# Patient Record
Sex: FEMALE | Race: WHITE | Employment: UNEMPLOYED | URBAN - METROPOLITAN AREA
[De-identification: names, ages, dates, MRNs, and addresses within clinical notes are randomized per-mention and may not be internally consistent; named-entity substitution may affect disease eponyms.]

---

## 2017-01-06 PROBLEM — M25.551 RIGHT HIP PAIN: Status: ACTIVE | Noted: 2017-01-06

## 2017-02-27 PROBLEM — S86.812A PATELLAR TENDON STRAIN, LEFT, INITIAL ENCOUNTER: Status: ACTIVE | Noted: 2017-02-27

## 2017-04-12 PROBLEM — E27.9 ADRENAL NODULE (HCC): Status: ACTIVE | Noted: 2017-04-12

## 2017-04-12 PROBLEM — I70.1 RENAL ARTERY ATHEROSCLEROSIS (HCC): Status: ACTIVE | Noted: 2017-04-12

## 2017-04-12 PROBLEM — E27.8 ADRENAL NODULE (HCC): Status: ACTIVE | Noted: 2017-04-12

## 2017-04-12 PROBLEM — I70.1 RENAL ARTERY ATHEROSCLEROSIS: Status: ACTIVE | Noted: 2017-04-12

## 2017-07-19 PROBLEM — E11.65 UNCONTROLLED TYPE 2 DIABETES MELLITUS WITH HYPERGLYCEMIA (HCC): Status: ACTIVE | Noted: 2017-07-19

## 2017-08-14 PROBLEM — S69.91XD RIGHT WRIST INJURY, SUBSEQUENT ENCOUNTER: Status: ACTIVE | Noted: 2017-08-14

## 2017-08-14 PROBLEM — M18.11 PRIMARY OSTEOARTHRITIS OF FIRST CARPOMETACARPAL JOINT OF RIGHT HAND: Status: ACTIVE | Noted: 2017-08-14

## 2017-10-26 PROBLEM — M25.551 RIGHT HIP PAIN: Status: RESOLVED | Noted: 2017-01-06 | Resolved: 2017-10-26

## 2017-10-26 PROBLEM — S69.91XD RIGHT WRIST INJURY, SUBSEQUENT ENCOUNTER: Status: RESOLVED | Noted: 2017-08-14 | Resolved: 2017-10-26

## 2017-10-26 PROBLEM — E11.65 UNCONTROLLED TYPE 2 DIABETES MELLITUS WITH HYPERGLYCEMIA (HCC): Status: RESOLVED | Noted: 2017-07-19 | Resolved: 2017-10-26

## 2017-10-26 PROBLEM — S86.812A PATELLAR TENDON STRAIN, LEFT, INITIAL ENCOUNTER: Status: RESOLVED | Noted: 2017-02-27 | Resolved: 2017-10-26

## 2017-11-10 PROBLEM — M53.3 CHRONIC RIGHT SACROILIAC JOINT PAIN: Status: ACTIVE | Noted: 2017-11-10

## 2017-11-10 PROBLEM — G89.29 CHRONIC RIGHT SACROILIAC JOINT PAIN: Status: ACTIVE | Noted: 2017-11-10

## 2018-01-03 PROBLEM — L03.113 CELLULITIS OF RIGHT ELBOW: Status: ACTIVE | Noted: 2018-01-03

## 2018-01-05 PROBLEM — Z45.2 PICC (PERIPHERALLY INSERTED CENTRAL CATHETER) FLUSH: Status: ACTIVE | Noted: 2018-01-05

## 2018-01-19 PROBLEM — R26.9 GAIT ABNORMALITY: Status: ACTIVE | Noted: 2018-01-19

## 2018-01-19 PROBLEM — R42 DIZZINESS: Status: ACTIVE | Noted: 2018-01-19

## 2018-01-22 PROCEDURE — 80061 LIPID PANEL: CPT | Performed by: INTERNAL MEDICINE

## 2018-01-24 PROBLEM — I50.32 CHRONIC DIASTOLIC CHF (CONGESTIVE HEART FAILURE) (HCC): Status: ACTIVE | Noted: 2018-01-24

## 2018-01-25 PROBLEM — R42 DIZZINESS: Status: RESOLVED | Noted: 2018-01-19 | Resolved: 2018-01-25

## 2018-01-25 PROBLEM — M18.11 PRIMARY OSTEOARTHRITIS OF FIRST CARPOMETACARPAL JOINT OF RIGHT HAND: Status: RESOLVED | Noted: 2017-08-14 | Resolved: 2018-01-25

## 2018-01-25 PROBLEM — M53.3 CHRONIC RIGHT SACROILIAC JOINT PAIN: Status: RESOLVED | Noted: 2017-11-10 | Resolved: 2018-01-25

## 2018-01-25 PROBLEM — G89.29 CHRONIC RIGHT SACROILIAC JOINT PAIN: Status: RESOLVED | Noted: 2017-11-10 | Resolved: 2018-01-25

## 2018-07-18 PROBLEM — Z79.4 TYPE 2 DIABETES MELLITUS WITH STAGE 3 CHRONIC KIDNEY DISEASE, WITH LONG-TERM CURRENT USE OF INSULIN (HCC): Status: ACTIVE | Noted: 2018-07-18

## 2018-07-18 PROBLEM — M25.562 ACUTE PAIN OF BOTH KNEES: Status: ACTIVE | Noted: 2018-07-18

## 2018-07-18 PROBLEM — E11.22 TYPE 2 DIABETES MELLITUS WITH STAGE 3 CHRONIC KIDNEY DISEASE, WITH LONG-TERM CURRENT USE OF INSULIN (HCC): Status: ACTIVE | Noted: 2018-07-18

## 2018-07-18 PROBLEM — N18.30 TYPE 2 DIABETES MELLITUS WITH STAGE 3 CHRONIC KIDNEY DISEASE, WITH LONG-TERM CURRENT USE OF INSULIN (HCC): Status: ACTIVE | Noted: 2018-07-18

## 2018-07-18 PROBLEM — M25.561 ACUTE PAIN OF BOTH KNEES: Status: ACTIVE | Noted: 2018-07-18

## 2018-08-21 PROBLEM — M25.562 CHRONIC PAIN OF LEFT KNEE: Status: ACTIVE | Noted: 2018-08-21

## 2018-08-21 PROBLEM — G89.29 CHRONIC PAIN OF LEFT KNEE: Status: ACTIVE | Noted: 2018-08-21

## 2018-08-21 PROCEDURE — 86141 C-REACTIVE PROTEIN HS: CPT | Performed by: ORTHOPAEDIC SURGERY

## 2018-09-14 PROBLEM — M47.817 LUMBOSACRAL SPONDYLOSIS WITHOUT MYELOPATHY: Status: ACTIVE | Noted: 2018-09-14

## 2018-10-15 PROBLEM — N17.9 ACUTE RENAL FAILURE (HCC): Status: ACTIVE | Noted: 2018-10-15

## 2018-10-15 PROBLEM — R55 SYNCOPE: Status: ACTIVE | Noted: 2018-06-18
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
## 2018-10-15 PROBLEM — M70.21 OLECRANON BURSITIS, RIGHT ELBOW: Status: ACTIVE | Noted: 2017-12-20

## 2018-10-15 PROBLEM — N17.9 ACUTE RENAL FAILURE: Status: ACTIVE | Noted: 2018-10-15

## 2019-01-07 PROBLEM — M46.96 INFLAMMATORY SPONDYLOPATHY OF LUMBAR REGION (HCC): Status: ACTIVE | Noted: 2019-01-07

## 2019-01-07 PROBLEM — I48.92 ATRIAL FLUTTER, UNSPECIFIED TYPE (HCC): Status: ACTIVE | Noted: 2019-01-07

## 2019-01-07 PROBLEM — M46.96 INFLAMMATORY SPONDYLOPATHY OF LUMBAR REGION: Status: ACTIVE | Noted: 2019-01-07
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
## 2019-01-21 PROBLEM — Z79.899 POLYPHARMACY: Status: ACTIVE | Noted: 2018-12-21

## 2019-01-21 PROBLEM — R00.0 SINUS TACHYCARDIA: Status: ACTIVE | Noted: 2018-12-20

## 2019-01-21 PROBLEM — R41.0 EPISODIC CONFUSION: Status: ACTIVE | Noted: 2018-12-20

## 2019-01-21 PROBLEM — E86.0 DEHYDRATION: Status: ACTIVE | Noted: 2018-12-20

## 2019-01-21 PROBLEM — R41.0 DELIRIUM: Status: ACTIVE | Noted: 2018-12-21

## 2019-03-19 PROCEDURE — 88175 CYTOPATH C/V AUTO FLUID REDO: CPT | Performed by: FAMILY MEDICINE

## 2019-03-19 PROCEDURE — 87086 URINE CULTURE/COLONY COUNT: CPT | Performed by: FAMILY MEDICINE

## 2019-03-19 PROCEDURE — 87624 HPV HI-RISK TYP POOLED RSLT: CPT | Performed by: FAMILY MEDICINE

## 2019-06-07 PROBLEM — E66.01 CLASS 2 SEVERE OBESITY WITH SERIOUS COMORBIDITY AND BODY MASS INDEX (BMI) OF 37.0 TO 37.9 IN ADULT (HCC): Status: ACTIVE | Noted: 2019-06-07

## 2019-06-07 PROBLEM — E66.812 CLASS 2 SEVERE OBESITY WITH SERIOUS COMORBIDITY AND BODY MASS INDEX (BMI) OF 37.0 TO 37.9 IN ADULT (HCC): Status: ACTIVE | Noted: 2019-06-07

## 2019-06-07 PROBLEM — J43.9 EMPHYSEMA OF LUNG (HCC): Status: ACTIVE | Noted: 2019-06-07

## 2019-06-10 PROBLEM — Z98.890 S/P ABLATION OF VENTRICULAR ARRHYTHMIA: Status: ACTIVE | Noted: 2019-06-10

## 2019-06-10 PROBLEM — Z86.79 S/P ABLATION OF VENTRICULAR ARRHYTHMIA: Status: ACTIVE | Noted: 2019-06-10

## 2019-06-12 PROBLEM — I47.19 ATRIAL TACHYCARDIA (HCC): Status: ACTIVE | Noted: 2019-06-12

## 2019-06-12 PROBLEM — I47.1 ATRIAL TACHYCARDIA (HCC): Status: ACTIVE | Noted: 2019-06-12

## 2019-08-26 PROBLEM — E11.22 TYPE 2 DIABETES MELLITUS WITH STAGE 3 CHRONIC KIDNEY DISEASE, WITHOUT LONG-TERM CURRENT USE OF INSULIN (HCC): Status: ACTIVE | Noted: 2018-07-18

## 2019-08-26 PROBLEM — N18.30 TYPE 2 DIABETES MELLITUS WITH STAGE 3 CHRONIC KIDNEY DISEASE, WITHOUT LONG-TERM CURRENT USE OF INSULIN (HCC): Status: ACTIVE | Noted: 2018-07-18

## 2019-11-25 PROBLEM — I47.10 SVT (SUPRAVENTRICULAR TACHYCARDIA) (HCC): Status: ACTIVE | Noted: 2019-11-25

## 2019-11-25 PROBLEM — I47.1 SVT (SUPRAVENTRICULAR TACHYCARDIA) (HCC): Status: ACTIVE | Noted: 2019-11-25
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
## 2019-12-26 PROBLEM — E86.0 DEHYDRATION: Status: RESOLVED | Noted: 2018-12-20 | Resolved: 2019-12-26

## 2019-12-26 PROBLEM — M25.561 ACUTE PAIN OF BOTH KNEES: Status: RESOLVED | Noted: 2018-07-18 | Resolved: 2019-12-26

## 2019-12-26 PROBLEM — M25.562 ACUTE PAIN OF BOTH KNEES: Status: RESOLVED | Noted: 2018-07-18 | Resolved: 2019-12-26

## 2019-12-26 PROBLEM — L03.113 CELLULITIS OF RIGHT ELBOW: Status: RESOLVED | Noted: 2018-01-03 | Resolved: 2019-12-26

## 2020-01-07 PROBLEM — Z79.4 TYPE 2 DIABETES MELLITUS WITH STAGE 3 CHRONIC KIDNEY DISEASE, WITH LONG-TERM CURRENT USE OF INSULIN (HCC): Status: ACTIVE | Noted: 2020-01-07

## 2020-01-07 PROBLEM — E11.22 TYPE 2 DIABETES MELLITUS WITH STAGE 3 CHRONIC KIDNEY DISEASE, WITH LONG-TERM CURRENT USE OF INSULIN (HCC): Status: ACTIVE | Noted: 2020-01-07

## 2020-01-07 PROBLEM — N18.30 TYPE 2 DIABETES MELLITUS WITH STAGE 3 CHRONIC KIDNEY DISEASE, WITH LONG-TERM CURRENT USE OF INSULIN (HCC): Status: ACTIVE | Noted: 2020-01-07

## 2020-02-28 PROBLEM — N18.30 TYPE 2 DIABETES MELLITUS WITH STAGE 3 CHRONIC KIDNEY DISEASE, WITHOUT LONG-TERM CURRENT USE OF INSULIN (HCC): Status: RESOLVED | Noted: 2018-07-18 | Resolved: 2020-02-28

## 2020-02-28 PROBLEM — E11.22 TYPE 2 DIABETES MELLITUS WITH STAGE 3 CHRONIC KIDNEY DISEASE, WITHOUT LONG-TERM CURRENT USE OF INSULIN (HCC): Status: RESOLVED | Noted: 2018-07-18 | Resolved: 2020-02-28

## 2020-06-11 PROBLEM — J44.9 COPD (CHRONIC OBSTRUCTIVE PULMONARY DISEASE) (HCC): Status: ACTIVE | Noted: 2020-06-11

## 2020-12-03 PROBLEM — I77.819 ECTATIC AORTA: Status: ACTIVE | Noted: 2020-12-03

## 2020-12-03 PROBLEM — I77.819 ECTATIC AORTA (HCC): Status: ACTIVE | Noted: 2020-12-03

## 2021-02-12 PROBLEM — I48.92 ATRIAL FLUTTER (HCC): Status: ACTIVE | Noted: 2021-02-12

## 2021-02-15 PROCEDURE — 88305 TISSUE EXAM BY PATHOLOGIST: CPT | Performed by: STUDENT IN AN ORGANIZED HEALTH CARE EDUCATION/TRAINING PROGRAM

## 2021-04-09 DIAGNOSIS — Z23 NEED FOR VACCINATION: ICD-10-CM

## 2021-07-12 PROBLEM — I47.19 ATRIAL TACHYCARDIA (HCC): Status: RESOLVED | Noted: 2019-06-12 | Resolved: 2021-07-12

## 2021-07-12 PROBLEM — N17.9 ACUTE RENAL FAILURE (HCC): Status: RESOLVED | Noted: 2018-10-15 | Resolved: 2021-07-12

## 2021-07-12 PROBLEM — I47.1 ATRIAL TACHYCARDIA (HCC): Status: RESOLVED | Noted: 2019-06-12 | Resolved: 2021-07-12

## 2021-07-12 PROBLEM — N17.9 ACUTE RENAL FAILURE: Status: RESOLVED | Noted: 2018-10-15 | Resolved: 2021-07-12

## 2021-08-20 PROBLEM — E66.09 CLASS 2 OBESITY DUE TO EXCESS CALORIES WITH BODY MASS INDEX (BMI) OF 39.0 TO 39.9 IN ADULT: Status: ACTIVE | Noted: 2019-06-07

## 2021-08-20 PROBLEM — E66.812 CLASS 2 OBESITY DUE TO EXCESS CALORIES WITH BODY MASS INDEX (BMI) OF 39.0 TO 39.9 IN ADULT: Status: ACTIVE | Noted: 2019-06-07

## 2021-10-25 PROBLEM — I65.21 STENOSIS OF RIGHT CAROTID ARTERY: Status: ACTIVE | Noted: 2021-10-25

## 2021-12-20 PROBLEM — D68.69 SECONDARY HYPERCOAGULABLE STATE (HCC): Status: ACTIVE | Noted: 2021-12-20

## 2021-12-20 PROBLEM — G31.9 BRAIN ATROPHY: Status: ACTIVE | Noted: 2021-12-20

## 2021-12-20 PROBLEM — G31.9 BRAIN ATROPHY (HCC): Status: ACTIVE | Noted: 2021-12-20

## 2021-12-20 PROBLEM — I13.0 HYPERTENSIVE HEART AND RENAL DISEASE WITH HEART FAILURE (HCC): Status: ACTIVE | Noted: 2021-12-20

## 2022-01-20 PROBLEM — N18.31 STAGE 3A CHRONIC KIDNEY DISEASE (HCC): Status: ACTIVE | Noted: 2022-01-20

## 2022-01-20 PROBLEM — G40.909 NONINTRACTABLE EPILEPSY WITHOUT STATUS EPILEPTICUS, UNSPECIFIED EPILEPSY TYPE (HCC): Status: ACTIVE | Noted: 2022-01-20

## 2022-02-04 PROBLEM — G40.909 NONINTRACTABLE EPILEPSY WITHOUT STATUS EPILEPTICUS, UNSPECIFIED EPILEPSY TYPE (HCC): Status: RESOLVED | Noted: 2022-01-20 | Resolved: 2022-02-04

## 2022-03-30 PROBLEM — H81.12 BENIGN PAROXYSMAL VERTIGO OF LEFT EAR: Status: ACTIVE | Noted: 2022-03-30

## 2022-04-08 PROBLEM — H81.13 BENIGN PAROXYSMAL VERTIGO OF BOTH EARS: Status: ACTIVE | Noted: 2022-04-08

## 2022-06-01 PROBLEM — S63.636A SPRAIN OF INTERPHALANGEAL JOINT OF RIGHT LITTLE FINGER: Status: ACTIVE | Noted: 2022-04-15

## 2022-06-01 PROBLEM — M79.644 THUMB PAIN, RIGHT: Status: ACTIVE | Noted: 2022-04-15

## 2022-10-18 PROBLEM — M17.11 PRIMARY OSTEOARTHRITIS OF RIGHT KNEE: Status: ACTIVE | Noted: 2022-09-15

## 2022-11-18 RX ORDER — ACETAMINOPHEN AND CODEINE PHOSPHATE 300; 30 MG/1; MG/1
1 TABLET ORAL EVERY 8 HOURS PRN
Status: ON HOLD | COMMUNITY
Start: 2022-10-29 | End: 2022-12-08 | Stop reason: CLARIF

## 2022-12-05 ENCOUNTER — LABORATORY ENCOUNTER (OUTPATIENT)
Dept: LAB | Age: 62
End: 2022-12-05
Payer: MEDICARE

## 2022-12-05 ENCOUNTER — LAB ENCOUNTER (OUTPATIENT)
Dept: LAB | Age: 62
End: 2022-12-05
Payer: MEDICARE

## 2022-12-05 DIAGNOSIS — Z01.812 ENCOUNTER FOR PREPROCEDURE SCREENING LABORATORY TESTING FOR COVID-19: ICD-10-CM

## 2022-12-05 DIAGNOSIS — M17.11 PRIMARY OSTEOARTHRITIS OF RIGHT KNEE: ICD-10-CM

## 2022-12-05 DIAGNOSIS — Z20.822 ENCOUNTER FOR PREPROCEDURE SCREENING LABORATORY TESTING FOR COVID-19: ICD-10-CM

## 2022-12-05 LAB
ANTIBODY SCREEN: NEGATIVE
RH BLOOD TYPE: POSITIVE

## 2022-12-05 PROCEDURE — 86850 RBC ANTIBODY SCREEN: CPT

## 2022-12-05 PROCEDURE — 86901 BLOOD TYPING SEROLOGIC RH(D): CPT

## 2022-12-05 PROCEDURE — 86900 BLOOD TYPING SEROLOGIC ABO: CPT

## 2022-12-05 PROCEDURE — 36415 COLL VENOUS BLD VENIPUNCTURE: CPT

## 2022-12-06 LAB — SARS-COV-2 RNA RESP QL NAA+PROBE: NOT DETECTED

## 2022-12-07 ENCOUNTER — ANESTHESIA EVENT (OUTPATIENT)
Dept: SURGERY | Facility: HOSPITAL | Age: 62
End: 2022-12-07
Payer: MEDICARE

## 2022-12-08 ENCOUNTER — ANESTHESIA (OUTPATIENT)
Dept: SURGERY | Facility: HOSPITAL | Age: 62
End: 2022-12-08
Payer: MEDICARE

## 2022-12-08 ENCOUNTER — APPOINTMENT (OUTPATIENT)
Dept: GENERAL RADIOLOGY | Facility: HOSPITAL | Age: 62
End: 2022-12-08
Attending: PHYSICIAN ASSISTANT
Payer: MEDICARE

## 2022-12-08 ENCOUNTER — HOSPITAL ENCOUNTER (OUTPATIENT)
Facility: HOSPITAL | Age: 62
Discharge: HOME HEALTH | End: 2022-12-09
Attending: ORTHOPAEDIC SURGERY | Admitting: ORTHOPAEDIC SURGERY
Payer: MEDICARE

## 2022-12-08 DIAGNOSIS — M17.11 PRIMARY OSTEOARTHRITIS OF RIGHT KNEE: Primary | ICD-10-CM

## 2022-12-08 DIAGNOSIS — Z20.822 ENCOUNTER FOR PREPROCEDURE SCREENING LABORATORY TESTING FOR COVID-19: ICD-10-CM

## 2022-12-08 DIAGNOSIS — Z01.812 ENCOUNTER FOR PREPROCEDURE SCREENING LABORATORY TESTING FOR COVID-19: ICD-10-CM

## 2022-12-08 LAB
GLUCOSE BLD-MCNC: 150 MG/DL (ref 70–99)
GLUCOSE BLD-MCNC: 182 MG/DL (ref 70–99)
GLUCOSE BLD-MCNC: 73 MG/DL (ref 70–99)
GLUCOSE BLD-MCNC: 79 MG/DL (ref 70–99)
GLUCOSE BLD-MCNC: 85 MG/DL (ref 70–99)

## 2022-12-08 PROCEDURE — 76942 ECHO GUIDE FOR BIOPSY: CPT | Performed by: ANESTHESIOLOGY

## 2022-12-08 PROCEDURE — 0SRC0J9 REPLACEMENT OF RIGHT KNEE JOINT WITH SYNTHETIC SUBSTITUTE, CEMENTED, OPEN APPROACH: ICD-10-PCS | Performed by: ORTHOPAEDIC SURGERY

## 2022-12-08 PROCEDURE — 88311 DECALCIFY TISSUE: CPT | Performed by: ORTHOPAEDIC SURGERY

## 2022-12-08 PROCEDURE — 82962 GLUCOSE BLOOD TEST: CPT

## 2022-12-08 PROCEDURE — 97530 THERAPEUTIC ACTIVITIES: CPT

## 2022-12-08 PROCEDURE — 88305 TISSUE EXAM BY PATHOLOGIST: CPT | Performed by: ORTHOPAEDIC SURGERY

## 2022-12-08 PROCEDURE — 97162 PT EVAL MOD COMPLEX 30 MIN: CPT

## 2022-12-08 PROCEDURE — 73560 X-RAY EXAM OF KNEE 1 OR 2: CPT | Performed by: PHYSICIAN ASSISTANT

## 2022-12-08 DEVICE — ATTUNE KNEE SYSTEM TIBIAL INSERT ROTATING PLATFORM POSTERIOR STABILIZED 4 5MM AOX
Type: IMPLANTABLE DEVICE | Site: KNEE | Status: FUNCTIONAL
Brand: ATTUNE

## 2022-12-08 DEVICE — ATTUNE PATELLA MEDIALIZED ANATOMIC 32MM CEMENTED AOX
Type: IMPLANTABLE DEVICE | Site: KNEE | Status: FUNCTIONAL
Brand: ATTUNE

## 2022-12-08 DEVICE — ATTUNE KNEE SYSTEM TIBIAL BASE ROTATING PLATFORM SIZE 4 CEMENTED
Type: IMPLANTABLE DEVICE | Site: KNEE | Status: FUNCTIONAL
Brand: ATTUNE

## 2022-12-08 DEVICE — SMARTSET GHV GENTAMICIN HIGH VISCOSITY BONE CEMENT 40G
Type: IMPLANTABLE DEVICE | Site: KNEE | Status: FUNCTIONAL
Brand: SMARTSET

## 2022-12-08 DEVICE — ATTUNE KNEE SYSTEM FEMORAL POSTERIOR STABILIZED NARROW SIZE 4N RIGHT CEMENTED
Type: IMPLANTABLE DEVICE | Site: KNEE | Status: FUNCTIONAL
Brand: ATTUNE

## 2022-12-08 RX ORDER — DIAZEPAM 5 MG/1
5 TABLET ORAL DAILY PRN
Status: DISCONTINUED | OUTPATIENT
Start: 2022-12-08 | End: 2022-12-09

## 2022-12-08 RX ORDER — MONTELUKAST SODIUM 10 MG/1
10 TABLET ORAL NIGHTLY
COMMUNITY

## 2022-12-08 RX ORDER — NICOTINE POLACRILEX 4 MG
15 LOZENGE BUCCAL
Status: DISCONTINUED | OUTPATIENT
Start: 2022-12-08 | End: 2022-12-08 | Stop reason: HOSPADM

## 2022-12-08 RX ORDER — MONTELUKAST SODIUM 10 MG/1
10 TABLET ORAL NIGHTLY
Status: DISCONTINUED | OUTPATIENT
Start: 2022-12-08 | End: 2022-12-09

## 2022-12-08 RX ORDER — BISACODYL 10 MG
10 SUPPOSITORY, RECTAL RECTAL
Status: DISCONTINUED | OUTPATIENT
Start: 2022-12-08 | End: 2022-12-09

## 2022-12-08 RX ORDER — ACETAMINOPHEN 325 MG/1
650 TABLET ORAL ONCE
Status: COMPLETED | OUTPATIENT
Start: 2022-12-08 | End: 2022-12-08

## 2022-12-08 RX ORDER — OXYCODONE HYDROCHLORIDE 10 MG/1
10 TABLET ORAL EVERY 4 HOURS PRN
Status: DISCONTINUED | OUTPATIENT
Start: 2022-12-08 | End: 2022-12-09

## 2022-12-08 RX ORDER — TIZANIDINE 2 MG/1
2 TABLET ORAL EVERY 6 HOURS PRN
Status: DISCONTINUED | OUTPATIENT
Start: 2022-12-08 | End: 2022-12-09

## 2022-12-08 RX ORDER — OXYCODONE HYDROCHLORIDE 5 MG/1
1 TABLET ORAL EVERY 4 HOURS PRN
COMMUNITY
Start: 2022-11-30

## 2022-12-08 RX ORDER — DEXTROSE MONOHYDRATE 50 MG/ML
INJECTION, SOLUTION INTRAVENOUS CONTINUOUS PRN
Status: DISCONTINUED | OUTPATIENT
Start: 2022-12-08 | End: 2022-12-08 | Stop reason: SURG

## 2022-12-08 RX ORDER — CYCLOBENZAPRINE HCL 5 MG
5 TABLET ORAL EVERY 8 HOURS PRN
Status: DISCONTINUED | OUTPATIENT
Start: 2022-12-08 | End: 2022-12-09

## 2022-12-08 RX ORDER — NICOTINE POLACRILEX 4 MG
30 LOZENGE BUCCAL
Status: DISCONTINUED | OUTPATIENT
Start: 2022-12-08 | End: 2022-12-08 | Stop reason: HOSPADM

## 2022-12-08 RX ORDER — PANTOPRAZOLE SODIUM 20 MG/1
20 TABLET, DELAYED RELEASE ORAL
Status: DISCONTINUED | OUTPATIENT
Start: 2022-12-09 | End: 2022-12-09

## 2022-12-08 RX ORDER — CELECOXIB 200 MG/1
200 CAPSULE ORAL DAILY
COMMUNITY
Start: 2022-11-30 | End: 2022-12-30

## 2022-12-08 RX ORDER — VANCOMYCIN HYDROCHLORIDE
15 ONCE
Status: COMPLETED | OUTPATIENT
Start: 2022-12-08 | End: 2022-12-09

## 2022-12-08 RX ORDER — QUETIAPINE FUMARATE 100 MG/1
100 TABLET, FILM COATED ORAL NIGHTLY PRN
Status: DISCONTINUED | OUTPATIENT
Start: 2022-12-08 | End: 2022-12-09

## 2022-12-08 RX ORDER — SENNOSIDES 8.6 MG
17.2 TABLET ORAL NIGHTLY
Status: DISCONTINUED | OUTPATIENT
Start: 2022-12-08 | End: 2022-12-09

## 2022-12-08 RX ORDER — LOSARTAN POTASSIUM 25 MG/1
25 TABLET ORAL DAILY
Status: DISCONTINUED | OUTPATIENT
Start: 2022-12-09 | End: 2022-12-09

## 2022-12-08 RX ORDER — SODIUM CHLORIDE, SODIUM LACTATE, POTASSIUM CHLORIDE, CALCIUM CHLORIDE 600; 310; 30; 20 MG/100ML; MG/100ML; MG/100ML; MG/100ML
INJECTION, SOLUTION INTRAVENOUS CONTINUOUS
Status: DISCONTINUED | OUTPATIENT
Start: 2022-12-08 | End: 2022-12-09

## 2022-12-08 RX ORDER — PHENYLEPHRINE HCL 10 MG/ML
VIAL (ML) INJECTION AS NEEDED
Status: DISCONTINUED | OUTPATIENT
Start: 2022-12-08 | End: 2022-12-08 | Stop reason: SURG

## 2022-12-08 RX ORDER — DEXAMETHASONE SODIUM PHOSPHATE 10 MG/ML
8 INJECTION, SOLUTION INTRAMUSCULAR; INTRAVENOUS ONCE
Status: COMPLETED | OUTPATIENT
Start: 2022-12-09 | End: 2022-12-09

## 2022-12-08 RX ORDER — ONDANSETRON 2 MG/ML
4 INJECTION INTRAMUSCULAR; INTRAVENOUS EVERY 6 HOURS PRN
Status: DISCONTINUED | OUTPATIENT
Start: 2022-12-08 | End: 2022-12-08 | Stop reason: HOSPADM

## 2022-12-08 RX ORDER — ONDANSETRON 2 MG/ML
4 INJECTION INTRAMUSCULAR; INTRAVENOUS EVERY 6 HOURS PRN
Status: DISCONTINUED | OUTPATIENT
Start: 2022-12-08 | End: 2022-12-09

## 2022-12-08 RX ORDER — OXYCODONE HYDROCHLORIDE 5 MG/1
5 TABLET ORAL EVERY 4 HOURS PRN
Status: DISCONTINUED | OUTPATIENT
Start: 2022-12-08 | End: 2022-12-09

## 2022-12-08 RX ORDER — HYDROMORPHONE HYDROCHLORIDE 1 MG/ML
0.8 INJECTION, SOLUTION INTRAMUSCULAR; INTRAVENOUS; SUBCUTANEOUS EVERY 2 HOUR PRN
Status: DISCONTINUED | OUTPATIENT
Start: 2022-12-08 | End: 2022-12-09

## 2022-12-08 RX ORDER — NICOTINE POLACRILEX 4 MG
30 LOZENGE BUCCAL
Status: DISCONTINUED | OUTPATIENT
Start: 2022-12-08 | End: 2022-12-09

## 2022-12-08 RX ORDER — ACETAMINOPHEN 500 MG
1000 TABLET ORAL ONCE
Status: DISCONTINUED | OUTPATIENT
Start: 2022-12-08 | End: 2022-12-08 | Stop reason: HOSPADM

## 2022-12-08 RX ORDER — DEXTROSE MONOHYDRATE 25 G/50ML
50 INJECTION, SOLUTION INTRAVENOUS
Status: DISCONTINUED | OUTPATIENT
Start: 2022-12-08 | End: 2022-12-08 | Stop reason: HOSPADM

## 2022-12-08 RX ORDER — FERROUS SULFATE 325(65) MG
325 TABLET ORAL DAILY
COMMUNITY
Start: 2022-11-30

## 2022-12-08 RX ORDER — QUETIAPINE FUMARATE 25 MG/1
TABLET, FILM COATED ORAL NIGHTLY PRN
Status: DISCONTINUED | OUTPATIENT
Start: 2022-12-08 | End: 2022-12-08 | Stop reason: SDUPTHER

## 2022-12-08 RX ORDER — ACETAMINOPHEN 325 MG/1
650 TABLET ORAL 4 TIMES DAILY
Status: DISCONTINUED | OUTPATIENT
Start: 2022-12-08 | End: 2022-12-09

## 2022-12-08 RX ORDER — VANCOMYCIN HYDROCHLORIDE 1 G/20ML
INJECTION, POWDER, LYOPHILIZED, FOR SOLUTION INTRAVENOUS AS NEEDED
Status: DISCONTINUED | OUTPATIENT
Start: 2022-12-08 | End: 2022-12-08 | Stop reason: SURG

## 2022-12-08 RX ORDER — DEXAMETHASONE SODIUM PHOSPHATE 10 MG/ML
INJECTION, SOLUTION INTRAMUSCULAR; INTRAVENOUS AS NEEDED
Status: DISCONTINUED | OUTPATIENT
Start: 2022-12-08 | End: 2022-12-08 | Stop reason: SURG

## 2022-12-08 RX ORDER — HYDROMORPHONE HYDROCHLORIDE 1 MG/ML
0.4 INJECTION, SOLUTION INTRAMUSCULAR; INTRAVENOUS; SUBCUTANEOUS EVERY 5 MIN PRN
Status: DISCONTINUED | OUTPATIENT
Start: 2022-12-08 | End: 2022-12-08 | Stop reason: HOSPADM

## 2022-12-08 RX ORDER — SCOLOPAMINE TRANSDERMAL SYSTEM 1 MG/1
1 PATCH, EXTENDED RELEASE TRANSDERMAL ONCE
Status: DISCONTINUED | OUTPATIENT
Start: 2022-12-08 | End: 2022-12-08 | Stop reason: HOSPADM

## 2022-12-08 RX ORDER — HYDROMORPHONE HYDROCHLORIDE 1 MG/ML
0.6 INJECTION, SOLUTION INTRAMUSCULAR; INTRAVENOUS; SUBCUTANEOUS EVERY 5 MIN PRN
Status: DISCONTINUED | OUTPATIENT
Start: 2022-12-08 | End: 2022-12-08 | Stop reason: HOSPADM

## 2022-12-08 RX ORDER — TRANEXAMIC ACID 10 MG/ML
1000 INJECTION, SOLUTION INTRAVENOUS ONCE
Status: COMPLETED | OUTPATIENT
Start: 2022-12-08 | End: 2022-12-08

## 2022-12-08 RX ORDER — METOPROLOL SUCCINATE 50 MG/1
50 TABLET, EXTENDED RELEASE ORAL DAILY
Status: DISCONTINUED | OUTPATIENT
Start: 2022-12-09 | End: 2022-12-09

## 2022-12-08 RX ORDER — GLIPIZIDE 5 MG/1
2.5 TABLET ORAL
COMMUNITY

## 2022-12-08 RX ORDER — DOCUSATE SODIUM 100 MG/1
100 CAPSULE, LIQUID FILLED ORAL 2 TIMES DAILY
Status: DISCONTINUED | OUTPATIENT
Start: 2022-12-08 | End: 2022-12-09

## 2022-12-08 RX ORDER — MIDAZOLAM HYDROCHLORIDE 1 MG/ML
INJECTION INTRAMUSCULAR; INTRAVENOUS AS NEEDED
Status: DISCONTINUED | OUTPATIENT
Start: 2022-12-08 | End: 2022-12-08 | Stop reason: SURG

## 2022-12-08 RX ORDER — NALOXONE HYDROCHLORIDE 0.4 MG/ML
80 INJECTION, SOLUTION INTRAMUSCULAR; INTRAVENOUS; SUBCUTANEOUS AS NEEDED
Status: DISCONTINUED | OUTPATIENT
Start: 2022-12-08 | End: 2022-12-08 | Stop reason: HOSPADM

## 2022-12-08 RX ORDER — METOPROLOL SUCCINATE 50 MG/1
50 TABLET, EXTENDED RELEASE ORAL DAILY
COMMUNITY

## 2022-12-08 RX ORDER — HYDROMORPHONE HYDROCHLORIDE 1 MG/ML
0.4 INJECTION, SOLUTION INTRAMUSCULAR; INTRAVENOUS; SUBCUTANEOUS EVERY 2 HOUR PRN
Status: DISCONTINUED | OUTPATIENT
Start: 2022-12-08 | End: 2022-12-09

## 2022-12-08 RX ORDER — ROSUVASTATIN CALCIUM 20 MG/1
40 TABLET, COATED ORAL NIGHTLY
Status: DISCONTINUED | OUTPATIENT
Start: 2022-12-08 | End: 2022-12-09

## 2022-12-08 RX ORDER — LAMOTRIGINE 100 MG/1
100 TABLET ORAL EVERY MORNING
Status: DISCONTINUED | OUTPATIENT
Start: 2022-12-08 | End: 2022-12-08

## 2022-12-08 RX ORDER — SPIRONOLACTONE 50 MG/1
50 TABLET, FILM COATED ORAL DAILY
COMMUNITY

## 2022-12-08 RX ORDER — SODIUM PHOSPHATE, DIBASIC AND SODIUM PHOSPHATE, MONOBASIC 7; 19 G/133ML; G/133ML
1 ENEMA RECTAL ONCE AS NEEDED
Status: DISCONTINUED | OUTPATIENT
Start: 2022-12-08 | End: 2022-12-09

## 2022-12-08 RX ORDER — QUETIAPINE FUMARATE 25 MG/1
50 TABLET, FILM COATED ORAL NIGHTLY PRN
Status: DISCONTINUED | OUTPATIENT
Start: 2022-12-08 | End: 2022-12-09

## 2022-12-08 RX ORDER — VENLAFAXINE HYDROCHLORIDE 75 MG/1
150 CAPSULE, EXTENDED RELEASE ORAL
Status: DISCONTINUED | OUTPATIENT
Start: 2022-12-09 | End: 2022-12-09

## 2022-12-08 RX ORDER — DIPHENHYDRAMINE HYDROCHLORIDE 50 MG/ML
25 INJECTION INTRAMUSCULAR; INTRAVENOUS ONCE AS NEEDED
Status: ACTIVE | OUTPATIENT
Start: 2022-12-08 | End: 2022-12-08

## 2022-12-08 RX ORDER — HYDROMORPHONE HYDROCHLORIDE 1 MG/ML
0.2 INJECTION, SOLUTION INTRAMUSCULAR; INTRAVENOUS; SUBCUTANEOUS EVERY 5 MIN PRN
Status: DISCONTINUED | OUTPATIENT
Start: 2022-12-08 | End: 2022-12-08 | Stop reason: HOSPADM

## 2022-12-08 RX ORDER — DIPHENHYDRAMINE HCL 25 MG
25 CAPSULE ORAL EVERY 4 HOURS PRN
Status: DISCONTINUED | OUTPATIENT
Start: 2022-12-08 | End: 2022-12-09

## 2022-12-08 RX ORDER — ACETAMINOPHEN 325 MG/1
TABLET ORAL
Status: COMPLETED
Start: 2022-12-08 | End: 2022-12-08

## 2022-12-08 RX ORDER — DEXTROSE MONOHYDRATE 25 G/50ML
50 INJECTION, SOLUTION INTRAVENOUS
Status: DISCONTINUED | OUTPATIENT
Start: 2022-12-08 | End: 2022-12-09

## 2022-12-08 RX ORDER — BUPRENORPHINE HYDROCHLORIDE 0.32 MG/ML
INJECTION INTRAMUSCULAR; INTRAVENOUS AS NEEDED
Status: DISCONTINUED | OUTPATIENT
Start: 2022-12-08 | End: 2022-12-08 | Stop reason: SURG

## 2022-12-08 RX ORDER — MELATONIN
325
Status: DISCONTINUED | OUTPATIENT
Start: 2022-12-09 | End: 2022-12-09

## 2022-12-08 RX ORDER — PROCHLORPERAZINE EDISYLATE 5 MG/ML
5 INJECTION INTRAMUSCULAR; INTRAVENOUS EVERY 8 HOURS PRN
Status: DISCONTINUED | OUTPATIENT
Start: 2022-12-08 | End: 2022-12-09

## 2022-12-08 RX ORDER — ROPIVACAINE HYDROCHLORIDE 5 MG/ML
INJECTION, SOLUTION EPIDURAL; INFILTRATION; PERINEURAL AS NEEDED
Status: DISCONTINUED | OUTPATIENT
Start: 2022-12-08 | End: 2022-12-08 | Stop reason: SURG

## 2022-12-08 RX ORDER — SODIUM CHLORIDE, SODIUM LACTATE, POTASSIUM CHLORIDE, CALCIUM CHLORIDE 600; 310; 30; 20 MG/100ML; MG/100ML; MG/100ML; MG/100ML
INJECTION, SOLUTION INTRAVENOUS CONTINUOUS
Status: DISCONTINUED | OUTPATIENT
Start: 2022-12-08 | End: 2022-12-08 | Stop reason: HOSPADM

## 2022-12-08 RX ORDER — NICOTINE POLACRILEX 4 MG
15 LOZENGE BUCCAL
Status: DISCONTINUED | OUTPATIENT
Start: 2022-12-08 | End: 2022-12-09

## 2022-12-08 RX ORDER — CLONIDINE 100 UG/ML
INJECTION, SOLUTION EPIDURAL AS NEEDED
Status: DISCONTINUED | OUTPATIENT
Start: 2022-12-08 | End: 2022-12-08 | Stop reason: SURG

## 2022-12-08 RX ORDER — INSULIN ASPART 100 [IU]/ML
INJECTION, SOLUTION INTRAVENOUS; SUBCUTANEOUS ONCE
Status: DISCONTINUED | OUTPATIENT
Start: 2022-12-08 | End: 2022-12-08 | Stop reason: HOSPADM

## 2022-12-08 RX ORDER — PSEUDOEPHEDRINE HCL 30 MG
100 TABLET ORAL 2 TIMES DAILY
COMMUNITY
Start: 2022-11-30

## 2022-12-08 RX ORDER — DIPHENHYDRAMINE HYDROCHLORIDE 50 MG/ML
12.5 INJECTION INTRAMUSCULAR; INTRAVENOUS EVERY 4 HOURS PRN
Status: DISCONTINUED | OUTPATIENT
Start: 2022-12-08 | End: 2022-12-09

## 2022-12-08 RX ORDER — ASPIRIN 325 MG
325 TABLET ORAL 2 TIMES DAILY
Status: DISCONTINUED | OUTPATIENT
Start: 2022-12-08 | End: 2022-12-09

## 2022-12-08 RX ORDER — POLYETHYLENE GLYCOL 3350 17 G/17G
17 POWDER, FOR SOLUTION ORAL DAILY PRN
Status: DISCONTINUED | OUTPATIENT
Start: 2022-12-08 | End: 2022-12-09

## 2022-12-08 RX ADMIN — PHENYLEPHRINE HCL 50 MCG: 10 MG/ML VIAL (ML) INJECTION at 10:06:00

## 2022-12-08 RX ADMIN — PHENYLEPHRINE HCL 50 MCG: 10 MG/ML VIAL (ML) INJECTION at 09:57:00

## 2022-12-08 RX ADMIN — PHENYLEPHRINE HCL 50 MCG: 10 MG/ML VIAL (ML) INJECTION at 09:49:00

## 2022-12-08 RX ADMIN — BUPRENORPHINE HYDROCHLORIDE 150 MCG: 0.32 INJECTION INTRAMUSCULAR; INTRAVENOUS at 09:54:00

## 2022-12-08 RX ADMIN — DEXAMETHASONE SODIUM PHOSPHATE 2 MG: 10 INJECTION, SOLUTION INTRAMUSCULAR; INTRAVENOUS at 09:54:00

## 2022-12-08 RX ADMIN — CLONIDINE 50 MCG: 100 INJECTION, SOLUTION EPIDURAL at 09:54:00

## 2022-12-08 RX ADMIN — ROPIVACAINE HYDROCHLORIDE 20 ML: 5 INJECTION, SOLUTION EPIDURAL; INFILTRATION; PERINEURAL at 09:54:00

## 2022-12-08 RX ADMIN — PHENYLEPHRINE HCL 50 MCG: 10 MG/ML VIAL (ML) INJECTION at 10:12:00

## 2022-12-08 RX ADMIN — TRANEXAMIC ACID 1000 MG: 10 INJECTION, SOLUTION INTRAVENOUS at 10:00:00

## 2022-12-08 RX ADMIN — PHENYLEPHRINE HCL 50 MCG: 10 MG/ML VIAL (ML) INJECTION at 11:06:00

## 2022-12-08 RX ADMIN — MIDAZOLAM HYDROCHLORIDE 1 MG: 1 INJECTION INTRAMUSCULAR; INTRAVENOUS at 10:47:00

## 2022-12-08 RX ADMIN — PHENYLEPHRINE HCL 50 MCG: 10 MG/ML VIAL (ML) INJECTION at 10:18:00

## 2022-12-08 RX ADMIN — MIDAZOLAM HYDROCHLORIDE 2 MG: 1 INJECTION INTRAMUSCULAR; INTRAVENOUS at 09:28:00

## 2022-12-08 RX ADMIN — DEXTROSE MONOHYDRATE: 50 INJECTION, SOLUTION INTRAVENOUS at 09:29:00

## 2022-12-08 RX ADMIN — VANCOMYCIN HYDROCHLORIDE 1000 MG: 1 INJECTION, POWDER, LYOPHILIZED, FOR SOLUTION INTRAVENOUS at 09:58:00

## 2022-12-08 NOTE — PROGRESS NOTES
Patient reporting she is feeling more confused this evening. No neurological deficits noted. Has received a significant amount of narcotic pain medication since arrival to inpatient unit. Will hold off on further narcotics, monitoring for neuro changes.

## 2022-12-08 NOTE — ANESTHESIA PROCEDURE NOTES
Regional Block    Date/Time: 12/8/2022 9:49 AM  Performed by: Betsy Buchanan DO  Authorized by: Betsy Buchanan DO       General Information and Staff    Start Time:  12/8/2022 9:49 AM  End Time:  12/8/2022 9:54 AM  Anesthesiologist:  Betsy Buchanan DO  Performed by: Anesthesiologist  Patient Location:  OR    Block Placement: Post Induction  Site Identification: real time ultrasound guided and image stored and retrievable    Block site/laterality marked before start: site marked  Reason for Block: at surgeon's request and post-op pain management    Preanesthetic Checklist: 2 patient identifers, IV checked, site marked, risks and benefits discussed, monitors and equipment checked, pre-op evaluation, timeout performed, anesthesia consent, sterile technique used, no prohibitive neurological deficits and no local skin infection at insertion site      Procedure Details    Patient Position:  Supine  Prep: ChloraPrep    Monitoring:  Cardiac monitor, continuous pulse ox and blood pressure cuff  Block Type: Adductor canal  Laterality:  Right  Injection Technique:  Single-shot    Needle    Needle Type:  Short-bevel and echogenic  Needle Length:  110 mm  Needle Localization:  Ultrasound guidance and anatomical landmarks  Reason for Ultrasound Use: appropriate spread of the medication was noted in real time and no ultrasound evidence of intravascular and/or intraneural injection            Assessment    Injection Assessment:  Good spread noted, negative resistance, negative aspiration for heme, incremental injection and low pressure  Heart Rate Change: No    - Patient tolerated block procedure well without evidence of immediate block related complications.      Medications  12/8/2022 9:49 AM      Additional Comments    Medication:  Ropivacaine 0.5% 20mL with 1:200,000 epi and 2 mg p.f. dexamethasone and 150 mcg buprenorphine and 50 mcg clonidine instilled

## 2022-12-08 NOTE — INTERVAL H&P NOTE
Pre-op Diagnosis: OSTEOARTHRITIS RIGTH KNEE    The above referenced H&P was reviewed by Gaviota Martins MD on 12/8/2022, the patient was examined and no significant changes have occurred in the patient's condition since the H&P was performed. I discussed with the patient and/or legal representative the potential benefits, risks and side effects of this procedure; the likelihood of the patient achieving goals; and potential problems that might occur during recuperation. I discussed reasonable alternatives to the procedure, including risks, benefits and side effects related to the alternatives and risks related to not receiving this procedure. We will proceed with procedure as planned.

## 2022-12-08 NOTE — PROGRESS NOTES
Patient arrived on unit from PACU s/p VERONICA Peterson reports pain as moderate to high, will start on PO and IV pain medications. Oriented to room and call light. Hospitalist notified of admission consult.

## 2022-12-08 NOTE — BRIEF OP NOTE
Pre-Operative Diagnosis: OSTEOARTHRITIS RIGTH KNEE     Post-Operative Diagnosis: OSTEOARTHRITIS RIGTH KNEE      Procedure Performed:   RIGHT TOTAL KNEE ARTHROPLASTY    Surgeon(s) and Role:     * Evangelist Holder MD - Primary    Assistant(s):  Surgical Assistant.: Stephanie Dey  PA: Newell Aase; Guillermo Medina PA-C     Surgical Findings: OA     Specimen: bone     Estimated Blood Loss: Blood Output: 20 mL (12/8/2022 10:55 AM)      Dictation Number:  Mahnaz Koenig MD  12/8/2022  11:12 AM

## 2022-12-08 NOTE — ANESTHESIA POSTPROCEDURE EVALUATION
[place] Patient Status:  Outpatient in a Bed   Age/Gender 57year old female MRN [id number]   Location 1310 [facility] Attending Ellen Fatima MD   Psychiatric Day # 0 PCP Kyle Alberts DO       Anesthesia Post-op Note    RIGHT TOTAL KNEE ARTHROPLASTY    Procedure Summary     Date: 12/08/22 Room / Location: 140[address] [facility] [place] OR 14 / 1404 [facility] [place] [place] OR    Anesthesia Start: 5535 Anesthesia Stop: 9933    Procedure: RIGHT TOTAL KNEE ARTHROPLASTY (Right) Diagnosis: (OSTEOARTHRITIS RIGTH KNEE)    Surgeons: Ellen Fatima MD Anesthesiologist: Edgar Lua DO    Anesthesia Type: spinal ASA Status: 3          Anesthesia Type: spinal    Vitals Value Taken Time   /36 12/08/22 1127   Temp 97.7 12/08/22 1127   Pulse 72 12/08/22 1127   Resp 18 12/08/22 1127   SpO2 100 12/08/22 1127       Patient Location: PACU    Anesthesia Type: spinal    Airway Patency: patent    Postop Pain Control: adequate    Mental Status: preanesthetic baseline    Nausea/Vomiting: none    Cardiopulmonary/Hydration status: stable euvolemic    Complications: no apparent anesthesia related complications    Postop vital signs: stable    Comments: Fluid bolus being administered    Dental Exam: Unchanged from Preop    Patient to be discharged from PACU when criteria met.

## 2022-12-08 NOTE — OPERATIVE REPORT
PATIENT'S NAME: Velma Siemens   ATTENDING PHYSICIAN: Anibal Tapia MD   OPERATING PHYSICIAN: Anibal Tapia MD   PATIENT ACCOUNT#:   [de-identified]    LOCATION:  [address] [place] [address],[address] RECORD #:   [id number]    YOB: 1960  ADMISSION DATE:  12/8/2022           OPERATION DATE:  12/8/2022     OPERATIVE REPORT     PREOPERATIVE DIAGNOSIS:  Right knee primary osteoarthritis. POSTOPERATIVE DIAGNOSIS: Right knee primary osteoarthritis. PROCEDURE PERFORMED:  Left total knee arthroplasty, kinematic alignment, Attune 4N cemented femur, cemented size 4 tibia, 5 mm rotating polyethylene, and cemented size 32 all-polyethylene patella. ASSISTANT:  LIBRA Lopez    Skilled assistance was needed for patient positioning, prepping and draping, instrument holding and passing, retracting and suturing. ANESTHESIA:  Spinal with regional block. INDICATIONS:  This is a 57 yrs old [place] female who presents with ongoing pain and difficulties with the knee consistent with the above diagnosis with failed conservative care. I reviewed the indications and benefits of the procedure. The patient understood and agreed to proceed. PROCEDURE:  The patient was taken to the operating room and placed in the supine position after administration of spinal anesthesia. A regional block was performed at the lower extremity. A tourniquet was placed at the thigh, which later was inflated to 300 mmHg. A time-out was performed confirming the right knee as the appropriate operative site. The patient received IV antibiotics preoperatively. The knee was prepped and draped in the usual sterile fashion. A knife was used to make a longitudinal incision at the anterior aspect of the knee. The Bovie was used for subcutaneous dissection and hemostasis. Dissection was carried down to the extensor mechanism, which was incised in line with the quadriceps tendon, carried medial to patella and medial to patellar tendon.   Upon entering the joint, normal joint fluid was produced. Soft tissue dissection was carried out and osteophytes were removed until the joint was adequately visualized. Irrisept irrigation was placed in the wound and allowed to sit for 1 minute before evacuating the solution. The knee was then flexed and the drill used to gain intramedullary access to the femur. The intramedullary jaden with the attached cutting guide was set to the appropriate valgus to accommodate the patient's femur per the preoperative templating, the guide was pinned in place and the distal femoral cut made. The caliper was used to confirm appropriate resection of the distal femur. Attention was turned to the tibia which was subluxed anteriorly. After adequate exposure, the extra medullary tibial guide was placed anterior to the leg. The posterior slope was determined, a pin was placed in the sliding hole of the guide. The alignment of the proximal tibia was then set, based on the preoperative templating, and the guide was pinned in place. The saw was then used to resect the proximal tibia, the caliper was used to evaluate the resection. The lollipop was then used to confirm extension gap at 5 mm. The sizing guide was then placed to the distal femur. The size was determined to be a 4N, and the drill pins placed. The cutting guide was placed and pinned in position. The resection at the posterior femoral condyles was confirmed to be appropriate. The anterior and posterior femur cuts were made. The lollipop was used to confirm flexion gap was equal to the extension gap before finishing the chamfer cuts and removing the guide. The finishing guide was then placed and pinned in position. The notch cut was made before removing the guide. Attention was turned to the posterior aspect where the osteophytes were removed and soft tissue was released before turning attention back to the tibia.   The size 4 tibial trial was found to be an appropriate fit with good coverage and no overhang. The guide was pinned in place with the tower. The reamer was used followed by the punch, which was left in place for trialing. The femur was impacted in place, the trial polyethylene was placed. The  knee was brought into full extension with good flexion and good ligament balance throughout. Attention was turned to the patella. The caliper was used to measure initial thickness before using the saw in a freehand technique to resect the articular surface of the patella. A size 32 was the appropriate fit and this was placed. Drill holes were made for the patella, the trial was then placed and tracked nicely with the trial femur. The drill holes were then made for the trial femur before removing all trial implants. The bone was prepared for cementing with pulse lavage irrigation. A bone plug was placed to the distal femur. The final implants were opened on the back table and the cement was mixed. When the cement was ready, it was finger-packed into position at the proximal tibia before placing the cement-coated tibial implant, impacting it in place, and removing excess cement. The femur was placed with cement in the same fashion before placing the final polyethylene and bringing the knee into extension, placing the foot on the Jasmine to compress the implants while the cement hardened. The patella was placed in the same fashion with cement. A clamp was used to hold it in place while the cement hardened. The knee was then copiously irrigated with pulse lavage irrigation before placing Irrisept into the wound and allowing it to sit for 1 minute before evacuating the solution. When the cement had hardened, the knee was flexed to confirm no further cement needed to be removed. The knee was put through good range of motion with good ligament balance and good patellar tracking. Closure was then performed.   A Quill suture was used in a running fashion to close the extensor mechanism. A #2 Orthocord suture was used in a figure-of-eight fashion at the medial parapatellar region to reinforce the repair. When the repair was complete, the knee was flexed to confirm stable repair. Subcutaneous tissue was then closed with inverted 2-0 Vicryl suture and the skin was closed with staples. A sterile dressing was placed. The patient tolerated the procedure well. There were no complications. Blood loss was approximately 20 mL. Tourniquet time was approximately 75 minutes. The patient was taken to the recovery room in stable condition.

## 2022-12-08 NOTE — PLAN OF CARE
Pt a/ox4. Pain reported high on arrival. Has received a total of 1.2 mg dilaudid, 15 mg oxycodone and 5 mg flexeril for pain control. Reporting she feels \"confused\" this evening. No neuro status changes noted. Will hold off on further narcotics for now. Dangled at edge of bed with therapy. Recommending up with 2 person assist if getting up and OOB this evening. QIDBS. Patient had one episode of coughing/choking on a Cheeto this evening. Patient able to dislodge with spontaneous cough. Pt and family report she does this frequently at home due to not chewing her food thoroughly. Will have ST see tomorrow. Plan to home with home health when cleared by PT/OT and pain is controlled.

## 2022-12-08 NOTE — ANESTHESIA PROCEDURE NOTES
Spinal Block    Date/Time: 12/8/2022 9:31 AM  Performed by: Douglas Avalos DO  Authorized by: Douglas Avalos DO       General Information and Staff    Start Time:  12/8/2022 9:31 AM  End Time:  12/8/2022 9:47 AM  Anesthesiologist:  Douglas Avalos DO  Performed by:   Anesthesiologist  Patient Location:  OR  Site identification: surface landmarks    Reason for Block: surgical anesthesia    Preanesthetic Checklist: patient identified, IV checked, risks and benefits discussed, monitors and equipment checked, pre-op evaluation, timeout performed, anesthesia consent and sterile technique used      Procedure Details    Patient Position:  Sitting  Prep: ChloraPrep    Monitoring:  Cardiac monitor, heart rate and continuous pulse ox  Approach:  Midline  Location:  L3-4  Injection Technique:  Single-shot    Needle    Needle Type:  Sprotte  Needle Gauge:  24 G  Needle Length:  3.5 in    Assessment    Sensory Level:  T8  Events: clear CSF, CSF aspirated, well tolerated and blood negative      Additional Comments     Sterile technique  Chloraprep skin prep  1% Lidocaine skin infiltration  24 ga sprotte introduced at L3-4  Positive for CSF  Negative for heme and paresthesias  2mL 2% Mepivicaine instilled  No complications

## 2022-12-09 VITALS
HEIGHT: 63 IN | DIASTOLIC BLOOD PRESSURE: 73 MMHG | TEMPERATURE: 98 F | RESPIRATION RATE: 18 BRPM | HEART RATE: 79 BPM | WEIGHT: 225 LBS | OXYGEN SATURATION: 90 % | BODY MASS INDEX: 39.87 KG/M2 | SYSTOLIC BLOOD PRESSURE: 126 MMHG

## 2022-12-09 LAB
GLUCOSE BLD-MCNC: 126 MG/DL (ref 70–99)
GLUCOSE BLD-MCNC: 160 MG/DL (ref 70–99)
HCT VFR BLD AUTO: 31.5 %
HGB BLD-MCNC: 10.2 G/DL

## 2022-12-09 PROCEDURE — 85018 HEMOGLOBIN: CPT | Performed by: PHYSICIAN ASSISTANT

## 2022-12-09 PROCEDURE — 92610 EVALUATE SWALLOWING FUNCTION: CPT

## 2022-12-09 PROCEDURE — 97165 OT EVAL LOW COMPLEX 30 MIN: CPT

## 2022-12-09 PROCEDURE — 97110 THERAPEUTIC EXERCISES: CPT

## 2022-12-09 PROCEDURE — 97116 GAIT TRAINING THERAPY: CPT

## 2022-12-09 PROCEDURE — 85014 HEMATOCRIT: CPT | Performed by: PHYSICIAN ASSISTANT

## 2022-12-09 PROCEDURE — 82962 GLUCOSE BLOOD TEST: CPT

## 2022-12-09 PROCEDURE — 97535 SELF CARE MNGMENT TRAINING: CPT

## 2022-12-09 NOTE — CM/SW NOTE
12/09/22 0800   CM/SW Referral Data   Referral Source Social Work (self-referral)   Reason for Referral Discharge planning   Informant EMR;Clinical Staff Member   Discharge Needs   Anticipated D/C needs Home health care       HOME SITUATION  Type of Home: House   Home Layout: One level  Stairs to Enter : 3  Railing: Yes     Lives With: Spouse  Drives: Yes  Patient Owned Equipment: Rolling walker;Rollator  Patient Regularly Uses: Reading glasses     Prior Level of [place] per PT eval: Pt lives with spouse in single story home. Pt typically independent with ADL and mobility. Pt ambulates with rollator. Patient is a 58 y/o woman admitted s/p TKA. Pt with pre-operative plan for [place] at [place]. PT recommending [address] services at discharge. Richard Gutierrez at [facility] confirmed pt accepted for [address] services at [place]. No other [place] needs/concerns identified at this time. / to remain available for support and/or discharge planning.      Sincere Fang LCSW  Discharge Planner  [phone]

## 2022-12-09 NOTE — PLAN OF CARE
A/ox4. Pain controlled with PO oxycodone. Passed PT/OT. Impulsive at times. Plan to home this afternoon.

## 2022-12-09 NOTE — PROGRESS NOTES
Patient discharged to home. Watched discharge video prior to depart. All questions answered. Verbalizes understanding of all teaching. Escorted via wheelchair to [facility] entrance and released to the care of her spouse.

## 2022-12-09 NOTE — PLAN OF CARE
Alert and oriented x4. VSS. On RA. . SCDs on BLE. Tolerating diet. Pain controlled with PRN medication. Aquacel and spandagrip to right knee C/D/I. Gel ice applied to right knee. Up with mod assist. PT/OT to see. Voiding. Call light within reach.

## 2023-01-11 ENCOUNTER — APPOINTMENT (OUTPATIENT)
Dept: GENERAL RADIOLOGY | Age: 63
End: 2023-01-11

## 2023-01-11 ENCOUNTER — HOSPITAL ENCOUNTER (EMERGENCY)
Age: 63
Discharge: HOME | End: 2023-01-11

## 2023-01-11 VITALS
WEIGHT: 220 LBS | BODY MASS INDEX: 38.98 KG/M2 | DIASTOLIC BLOOD PRESSURE: 94 MMHG | RESPIRATION RATE: 18 BRPM | HEIGHT: 63 IN | SYSTOLIC BLOOD PRESSURE: 114 MMHG | HEART RATE: 70 BPM | OXYGEN SATURATION: 98 % | TEMPERATURE: 98.7 F
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
## 2023-01-11 DIAGNOSIS — S86.911A MUSCLE STRAIN OF RIGHT LOWER LEG, INITIAL ENCOUNTER: ICD-10-CM

## 2023-01-11 PROCEDURE — 99284 EMERGENCY DEPT VISIT MOD MDM: CPT

## 2023-01-11 PROCEDURE — 96372 THER/PROPH/DIAG INJ SC/IM: CPT

## 2023-01-11 PROCEDURE — 10002800 HB RX 250 W HCPCS: Performed by: NURSE PRACTITIONER

## 2023-01-11 PROCEDURE — 10002803 HB RX 637: Performed by: PHYSICIAN ASSISTANT

## 2023-01-11 PROCEDURE — 73562 X-RAY EXAM OF KNEE 3: CPT

## 2023-01-11 PROCEDURE — 73552 X-RAY EXAM OF FEMUR 2/>: CPT

## 2023-01-11 RX ORDER — CYCLOBENZAPRINE HCL 10 MG
10 TABLET ORAL 3 TIMES DAILY PRN
Qty: 15 TABLET | Refills: 0 | Status: SHIPPED | OUTPATIENT
Start: 2023-01-11

## 2023-01-11 RX ORDER — HYDROCODONE BITARTRATE AND ACETAMINOPHEN 5; 325 MG/1; MG/1
1-2 TABLET ORAL EVERY 4 HOURS PRN
Qty: 12 TABLET | Refills: 0 | Status: SHIPPED | OUTPATIENT
Start: 2023-01-11

## 2023-01-11 RX ORDER — DIAZEPAM 5 MG/ML
5 INJECTION, SOLUTION INTRAMUSCULAR; INTRAVENOUS ONCE
Status: COMPLETED | OUTPATIENT
Start: 2023-01-11 | End: 2023-01-11

## 2023-01-11 RX ORDER — HYDROCODONE BITARTRATE AND ACETAMINOPHEN 5; 325 MG/1; MG/1
1 TABLET ORAL ONCE
Status: COMPLETED | OUTPATIENT
Start: 2023-01-11 | End: 2023-01-11

## 2023-01-11 RX ADMIN — DIAZEPAM 5 MG: 5 INJECTION INTRAMUSCULAR; INTRAVENOUS at 16:18

## 2023-01-11 RX ADMIN — HYDROCODONE BITARTRATE AND ACETAMINOPHEN 1 TABLET: 5; 325 TABLET ORAL at 14:40

## 2023-01-11 ASSESSMENT — ENCOUNTER SYMPTOMS
ABDOMINAL DISTENTION: 0
FEVER: 0
SHORTNESS OF BREATH: 0
SORE THROAT: 0
CHILLS: 0
FATIGUE: 0
WEAKNESS: 0
NUMBNESS: 0
DIZZINESS: 0
DIARRHEA: 0
VOMITING: 0

## 2023-01-11 ASSESSMENT — PAIN SCALES - GENERAL
PAINLEVEL_OUTOF10: 10
PAINLEVEL_OUTOF10: 6

## 2023-01-17 ENCOUNTER — TELEPHONE (OUTPATIENT)
Dept: EMERGENCY MEDICINE | Age: 63
End: 2023-01-17

## 2023-02-06 ENCOUNTER — LAB ENCOUNTER (OUTPATIENT)
Dept: LAB | Age: 63
End: 2023-02-06
Attending: ORTHOPAEDIC SURGERY
Payer: MEDICARE

## 2023-02-06 DIAGNOSIS — Z01.818 PRE-OP TESTING: ICD-10-CM

## 2023-02-07 LAB — SARS-COV-2 RNA RESP QL NAA+PROBE: NOT DETECTED

## 2023-02-09 ENCOUNTER — ANESTHESIA (OUTPATIENT)
Dept: SURGERY | Facility: HOSPITAL | Age: 63
End: 2023-02-09
Payer: MEDICARE

## 2023-02-09 ENCOUNTER — HOSPITAL ENCOUNTER (OUTPATIENT)
Facility: HOSPITAL | Age: 63
Setting detail: HOSPITAL OUTPATIENT SURGERY
Discharge: HOME | End: 2023-02-09
Attending: ORTHOPAEDIC SURGERY | Admitting: ORTHOPAEDIC SURGERY
Payer: MEDICARE

## 2023-02-09 ENCOUNTER — ANESTHESIA EVENT (OUTPATIENT)
Dept: SURGERY | Facility: HOSPITAL | Age: 63
End: 2023-02-09
Payer: MEDICARE

## 2023-02-09 VITALS
TEMPERATURE: 97 F | HEIGHT: 63 IN | SYSTOLIC BLOOD PRESSURE: 125 MMHG | WEIGHT: 211 LBS | OXYGEN SATURATION: 96 % | DIASTOLIC BLOOD PRESSURE: 52 MMHG | RESPIRATION RATE: 18 BRPM | HEART RATE: 72 BPM | BODY MASS INDEX: 37.39 KG/M2

## 2023-02-09 DIAGNOSIS — Z01.818 PRE-OP TESTING: Primary | ICD-10-CM

## 2023-02-09 LAB
GLUCOSE BLD-MCNC: 105 MG/DL (ref 70–99)
GLUCOSE BLD-MCNC: 128 MG/DL (ref 70–99)

## 2023-02-09 PROCEDURE — 82962 GLUCOSE BLOOD TEST: CPT

## 2023-02-09 PROCEDURE — 76942 ECHO GUIDE FOR BIOPSY: CPT | Performed by: ANESTHESIOLOGY

## 2023-02-09 PROCEDURE — 0LQL0ZZ REPAIR RIGHT UPPER LEG TENDON, OPEN APPROACH: ICD-10-PCS | Performed by: ORTHOPAEDIC SURGERY

## 2023-02-09 RX ORDER — MIDAZOLAM HYDROCHLORIDE 1 MG/ML
1 INJECTION INTRAMUSCULAR; INTRAVENOUS EVERY 5 MIN PRN
Status: DISCONTINUED | OUTPATIENT
Start: 2023-02-09 | End: 2023-02-09

## 2023-02-09 RX ORDER — METOCLOPRAMIDE HYDROCHLORIDE 5 MG/ML
INJECTION INTRAMUSCULAR; INTRAVENOUS AS NEEDED
Status: DISCONTINUED | OUTPATIENT
Start: 2023-02-09 | End: 2023-02-09 | Stop reason: SURG

## 2023-02-09 RX ORDER — NALOXONE HYDROCHLORIDE 0.4 MG/ML
80 INJECTION, SOLUTION INTRAMUSCULAR; INTRAVENOUS; SUBCUTANEOUS AS NEEDED
Status: DISCONTINUED | OUTPATIENT
Start: 2023-02-09 | End: 2023-02-09

## 2023-02-09 RX ORDER — SCOLOPAMINE TRANSDERMAL SYSTEM 1 MG/1
1 PATCH, EXTENDED RELEASE TRANSDERMAL ONCE
Status: DISCONTINUED | OUTPATIENT
Start: 2023-02-09 | End: 2023-02-09 | Stop reason: HOSPADM

## 2023-02-09 RX ORDER — VANCOMYCIN HYDROCHLORIDE 1 G/20ML
INJECTION, POWDER, LYOPHILIZED, FOR SOLUTION INTRAVENOUS
Status: DISCONTINUED
Start: 2023-02-09 | End: 2023-02-09

## 2023-02-09 RX ORDER — NICOTINE POLACRILEX 4 MG
30 LOZENGE BUCCAL
Status: DISCONTINUED | OUTPATIENT
Start: 2023-02-09 | End: 2023-02-09 | Stop reason: HOSPADM

## 2023-02-09 RX ORDER — ONDANSETRON 2 MG/ML
4 INJECTION INTRAMUSCULAR; INTRAVENOUS EVERY 6 HOURS PRN
Status: DISCONTINUED | OUTPATIENT
Start: 2023-02-09 | End: 2023-02-09

## 2023-02-09 RX ORDER — INSULIN ASPART 100 [IU]/ML
INJECTION, SOLUTION INTRAVENOUS; SUBCUTANEOUS ONCE
Status: DISCONTINUED | OUTPATIENT
Start: 2023-02-09 | End: 2023-02-09

## 2023-02-09 RX ORDER — HYDROMORPHONE HYDROCHLORIDE 1 MG/ML
INJECTION, SOLUTION INTRAMUSCULAR; INTRAVENOUS; SUBCUTANEOUS
Status: COMPLETED
Start: 2023-02-09 | End: 2023-02-09

## 2023-02-09 RX ORDER — HYDROMORPHONE HYDROCHLORIDE 1 MG/ML
0.6 INJECTION, SOLUTION INTRAMUSCULAR; INTRAVENOUS; SUBCUTANEOUS EVERY 5 MIN PRN
Status: DISCONTINUED | OUTPATIENT
Start: 2023-02-09 | End: 2023-02-09

## 2023-02-09 RX ORDER — SODIUM CHLORIDE, SODIUM LACTATE, POTASSIUM CHLORIDE, CALCIUM CHLORIDE 600; 310; 30; 20 MG/100ML; MG/100ML; MG/100ML; MG/100ML
INJECTION, SOLUTION INTRAVENOUS CONTINUOUS
Status: DISCONTINUED | OUTPATIENT
Start: 2023-02-09 | End: 2023-02-09

## 2023-02-09 RX ORDER — ACETAMINOPHEN 500 MG
1000 TABLET ORAL ONCE
Status: DISCONTINUED | OUTPATIENT
Start: 2023-02-09 | End: 2023-02-09 | Stop reason: HOSPADM

## 2023-02-09 RX ORDER — HYDROCODONE BITARTRATE AND ACETAMINOPHEN 5; 325 MG/1; MG/1
2 TABLET ORAL ONCE AS NEEDED
Status: COMPLETED | OUTPATIENT
Start: 2023-02-09 | End: 2023-02-09

## 2023-02-09 RX ORDER — PROCHLORPERAZINE EDISYLATE 5 MG/ML
5 INJECTION INTRAMUSCULAR; INTRAVENOUS EVERY 8 HOURS PRN
Status: DISCONTINUED | OUTPATIENT
Start: 2023-02-09 | End: 2023-02-09

## 2023-02-09 RX ORDER — ACETAMINOPHEN 500 MG
1000 TABLET ORAL ONCE AS NEEDED
Status: COMPLETED | OUTPATIENT
Start: 2023-02-09 | End: 2023-02-09

## 2023-02-09 RX ORDER — HYDROMORPHONE HYDROCHLORIDE 1 MG/ML
0.4 INJECTION, SOLUTION INTRAMUSCULAR; INTRAVENOUS; SUBCUTANEOUS EVERY 5 MIN PRN
Status: DISCONTINUED | OUTPATIENT
Start: 2023-02-09 | End: 2023-02-09

## 2023-02-09 RX ORDER — ROPIVACAINE HYDROCHLORIDE 5 MG/ML
INJECTION, SOLUTION EPIDURAL; INFILTRATION; PERINEURAL AS NEEDED
Status: DISCONTINUED | OUTPATIENT
Start: 2023-02-09 | End: 2023-02-09 | Stop reason: SURG

## 2023-02-09 RX ORDER — HYDROCODONE BITARTRATE AND ACETAMINOPHEN 5; 325 MG/1; MG/1
1 TABLET ORAL ONCE AS NEEDED
Status: COMPLETED | OUTPATIENT
Start: 2023-02-09 | End: 2023-02-09

## 2023-02-09 RX ORDER — MIDAZOLAM HYDROCHLORIDE 1 MG/ML
INJECTION INTRAMUSCULAR; INTRAVENOUS AS NEEDED
Status: DISCONTINUED | OUTPATIENT
Start: 2023-02-09 | End: 2023-02-09 | Stop reason: SURG

## 2023-02-09 RX ORDER — DEXAMETHASONE SODIUM PHOSPHATE 10 MG/ML
INJECTION, SOLUTION INTRAMUSCULAR; INTRAVENOUS AS NEEDED
Status: DISCONTINUED | OUTPATIENT
Start: 2023-02-09 | End: 2023-02-09 | Stop reason: SURG

## 2023-02-09 RX ORDER — 0.9 % SODIUM CHLORIDE 0.9 %
INTRAVENOUS SOLUTION INTRAVENOUS
Status: DISCONTINUED
Start: 2023-02-09 | End: 2023-02-09

## 2023-02-09 RX ORDER — DEXTROSE MONOHYDRATE 25 G/50ML
50 INJECTION, SOLUTION INTRAVENOUS
Status: DISCONTINUED | OUTPATIENT
Start: 2023-02-09 | End: 2023-02-09 | Stop reason: HOSPADM

## 2023-02-09 RX ORDER — HYDROMORPHONE HYDROCHLORIDE 1 MG/ML
0.2 INJECTION, SOLUTION INTRAMUSCULAR; INTRAVENOUS; SUBCUTANEOUS EVERY 5 MIN PRN
Status: DISCONTINUED | OUTPATIENT
Start: 2023-02-09 | End: 2023-02-09

## 2023-02-09 RX ORDER — ONDANSETRON 2 MG/ML
INJECTION INTRAMUSCULAR; INTRAVENOUS AS NEEDED
Status: DISCONTINUED | OUTPATIENT
Start: 2023-02-09 | End: 2023-02-09 | Stop reason: SURG

## 2023-02-09 RX ORDER — NICOTINE POLACRILEX 4 MG
15 LOZENGE BUCCAL
Status: DISCONTINUED | OUTPATIENT
Start: 2023-02-09 | End: 2023-02-09 | Stop reason: HOSPADM

## 2023-02-09 RX ORDER — LIDOCAINE HYDROCHLORIDE 10 MG/ML
INJECTION, SOLUTION EPIDURAL; INFILTRATION; INTRACAUDAL; PERINEURAL AS NEEDED
Status: DISCONTINUED | OUTPATIENT
Start: 2023-02-09 | End: 2023-02-09 | Stop reason: SURG

## 2023-02-09 RX ORDER — LABETALOL HYDROCHLORIDE 5 MG/ML
5 INJECTION, SOLUTION INTRAVENOUS EVERY 5 MIN PRN
Status: DISCONTINUED | OUTPATIENT
Start: 2023-02-09 | End: 2023-02-09

## 2023-02-09 RX ADMIN — SODIUM CHLORIDE, SODIUM LACTATE, POTASSIUM CHLORIDE, CALCIUM CHLORIDE: 600; 310; 30; 20 INJECTION, SOLUTION INTRAVENOUS at 15:05:00

## 2023-02-09 RX ADMIN — ROPIVACAINE HYDROCHLORIDE 30 ML: 5 INJECTION, SOLUTION EPIDURAL; INFILTRATION; PERINEURAL at 15:19:00

## 2023-02-09 RX ADMIN — ONDANSETRON 4 MG: 2 INJECTION INTRAMUSCULAR; INTRAVENOUS at 15:48:00

## 2023-02-09 RX ADMIN — DEXAMETHASONE SODIUM PHOSPHATE 2 MG: 10 INJECTION, SOLUTION INTRAMUSCULAR; INTRAVENOUS at 15:19:00

## 2023-02-09 RX ADMIN — MIDAZOLAM HYDROCHLORIDE 2 MG: 1 INJECTION INTRAMUSCULAR; INTRAVENOUS at 15:05:00

## 2023-02-09 RX ADMIN — METOCLOPRAMIDE HYDROCHLORIDE 10 MG: 5 INJECTION INTRAMUSCULAR; INTRAVENOUS at 15:24:00

## 2023-02-09 RX ADMIN — LIDOCAINE HYDROCHLORIDE 30 MG: 10 INJECTION, SOLUTION EPIDURAL; INFILTRATION; INTRACAUDAL; PERINEURAL at 15:10:00

## 2023-02-09 NOTE — ANESTHESIA POSTPROCEDURE EVALUATION
[place] Patient Status:  Hospital Outpatient Surgery   Age/Gender 57year old female MRN [id number]   Location 1310 [facility] Attending Shonda Linda MD   Hosp Day # 0 PCP Felipe Anguiano DO       Anesthesia Post-op Note    RIGHT QUADRICEPS TENDON REPAIR    Procedure Summary     Date: 02/09/23 Room / Location: [facility] OR 12 / 1404 [facility] OR    Anesthesia Start: 5134 Anesthesia Stop: 0853    Procedure: RIGHT QUADRICEPS TENDON REPAIR (Right: Knee) Diagnosis: (RIGHT QUADRICEPS TENDON RUPTURE)    Surgeons: Shonda Linda MD Anesthesiologist: Prisca Wu MD    Anesthesia Type: general ASA Status: 3          Anesthesia Type: general    Vitals Value Taken Time   /56 02/09/23 1618   Temp 98.4 02/09/23 1618   Pulse 84 02/09/23 1618   Resp 16 02/09/23 1618   SpO2 95 02/09/23 1618       Patient Location: PACU    Anesthesia Type: general    Airway Patency: patent    Postop Pain Control: adequate    Mental Status: mildly sedated but able to meaningfully participate in the post-anesthesia evaluation    Nausea/Vomiting: none    Cardiopulmonary/Hydration status: stable euvolemic    Complications: no apparent anesthesia related complications    Postop vital signs: stable    Dental Exam: Unchanged from Preop

## 2023-02-09 NOTE — ANESTHESIA PROCEDURE NOTES
Airway  Date/Time: 2/9/2023 3:11 PM  Urgency: elective    Airway not difficult    General Information and Staff    Patient location during procedure: OR  Anesthesiologist: Leanna Hdez MD  Performed: anesthesiologist     Indications and Patient Condition  Indications for airway management: anesthesia  Sedation level: deep  Preoxygenated: yes  Patient position: sniffing  Mask difficulty assessment: 1 - vent by mask    Final Airway Details  Final airway type: supraglottic airway      Successful airway: classic  Size 3       Number of attempts at approach: 1

## 2023-02-09 NOTE — INTERVAL H&P NOTE
Pre-op Diagnosis: RIGHT QUADRICEPS TENDON RUPTURE    The above referenced H&P was reviewed by Adam Rasmussen MD on 2/9/2023, the patient was examined and no significant changes have occurred in the patient's condition since the H&P was performed. I discussed with the patient and/or legal representative the potential benefits, risks and side effects of this procedure; the likelihood of the patient achieving goals; and potential problems that might occur during recuperation. I discussed reasonable alternatives to the procedure, including risks, benefits and side effects related to the alternatives and risks related to not receiving this procedure. We will proceed with procedure as planned.

## 2023-02-09 NOTE — BRIEF OP NOTE
Pre-Operative Diagnosis: RIGHT QUADRICEPS TENDON RUPTURE     Post-Operative Diagnosis: RIGHT QUADRICEPS TENDON RUPTURE      Procedure Performed:   RIGHT QUADRICEPS TENDON REPAIR    Surgeon(s) and Role:     * Kelvin Solorzano MD - Primary    Assistant(s):  PA:  Ti Tang PA-C     Surgical Findings: tear     Specimen: none     Estimated Blood Loss: 5 cc    Dictation Number:  Alena Vieyra MD  2/9/2023  4:03 PM

## 2023-02-09 NOTE — ANESTHESIA PROCEDURE NOTES
Regional Block    Date/Time: 2/9/2023 3:12 PM  Performed by: Bryan Rausch MD  Authorized by: Bryan Rausch MD       General Information and Staff    Start Time:  2/9/2023 3:12 PM  End Time:  2/9/2023 3:19 PM  Anesthesiologist:  Bryan Rausch MD  Performed by: Anesthesiologist  Patient Location:  OR    Block Placement: Post Induction  Site Identification: real time ultrasound guided and image stored and retrievable    Block site/laterality marked before start: site marked  Reason for Block: at surgeon's request and post-op pain management    Preanesthetic Checklist: 2 patient identifers, IV checked, site marked, risks and benefits discussed, monitors and equipment checked, pre-op evaluation, timeout performed, anesthesia consent, sterile technique used, no prohibitive neurological deficits and no local skin infection at insertion site      Procedure Details    Patient Position:  Supine  Prep: ChloraPrep    Monitoring:  Cardiac monitor, continuous pulse ox and blood pressure cuff  Block Type:  Femoral  Laterality:  Right  Injection Technique:  Single-shot    Needle    Needle Type:  Short-bevel and echogenic  Needle Gauge:  21 G  Needle Localization:  Ultrasound guidance and nerve stimulator  Reason for Ultrasound Use: appropriate spread of the medication was noted in real time and no ultrasound evidence of intravascular and/or intraneural injection      Muscle Twitch Response: quadriceps response      Assessment    Injection Assessment:  Good spread noted, negative resistance, negative aspiration for heme, incremental injection and low pressure  Heart Rate Change: No    - Patient tolerated block procedure well without evidence of immediate block related complications.      Medications  2/9/2023 3:12 PM      Additional Comments    Medication:  Ropivacaine 0.5% 30mL + Decadron PF 2mg

## 2023-02-09 NOTE — OPERATIVE REPORT
[facility]    PATIENT'S NAME: Eyal Ortez   ATTENDING PHYSICIAN: Arlyn Reed M.D. OPERATING PHYSICIAN: Arlyn Reed M.D. PATIENT ACCOUNT#:   [de-identified]    LOCATION:  PACU [address] [place] [address] PACU 6 ED 10  MEDICAL RECORD #:   [id number]       YOB: 1960  ADMISSION DATE:       02/09/2023      OPERATION DATE:  02/09/2023    OPERATIVE REPORT      PREOPERATIVE DIAGNOSIS:  Right extensor tendon repair failure, traumatic, status post left total knee arthroplasty. POSTOPERATIVE DIAGNOSIS:  Right extensor tendon repair failure, traumatic, status post left total knee arthroplasty. PROCEDURE:  Right quadriceps tendon/extensor mechanism repair. ASSISTANT:  Stanislaw Villegas PA-C. Assistance needed for patient positioning, prepping, draping, instrument passing and holding, suturing and closing. ANESTHESIA:  General with regional block. INDICATIONS:  This is a 73-year-old white female who presents status post left total knee arthroplasty with increased pain and evidence of an extensor repair disruption following a fall at home. I reviewed the indications, benefits of the procedure. The patient understood and agreed to proceed. OPERATIVE TECHNIQUE:  The patient was taken to the operating room and placed in supine position where general anesthesia was administered. A regional block was performed at the right lower extremity. A tourniquet was placed the right thigh, which was later inflated to 300 mmHg. A time-out was performed, the right knee was the appropriate operative site. The patient received IV antibiotics preoperatively. The right lower extremity was prepped and draped in the usual sterile fashion. A knife was used to make a longitudinal incision in line with the previous incision site. The Bovie was used for subcutaneous dissection and hemostasis.   The Metzenbaum and curved Jasmine scissors were then used to dissect down to the quadriceps/extensor tendon region identifying the defect with production of serous fluid from the joint which appeared benign. Copious irrigation was used at the wound and knee joint. The tendon was mobilized from the medial aspect of the quads. After adequate mobilization, a #5 FiberWire suture was used in a figure-of-eight fashion to repair the tendon side-to-side in a secure fashion. When the repair was complete, the knee was flexed to at least 90 degrees with a stable repair. After further irrigation, closure was performed. A #1 Vicryl suture was used to reapproximate the deep soft tissue. The superficial subcutaneous tissue was closed with inverted 2-0 Vicryl sutures. The skin was closed with staples. A sterile dressing was placed followed by a knee immobilizer. The patient tolerated the procedure well. There were no complications. Blood loss was approximately 5 mL. Tourniquet time was approximately 30 minutes. The patient was taken to Recovery in stable condition to be discharged home when fully recovered.     Dictated By Savi Lora M.D.  d: 02/09/2023 16:07:08  t: 02/09/2023 16:35:25  [facility] [id number]  /

## 2023-02-09 NOTE — DISCHARGE INSTRUCTIONS
Elevate and ice, may remove dssg POD#7  Keep incision clean and dry  Brace on at all times  Weight bear as tolerated crutch/walker ambulation in brace  Pt has Rx for pain, f/u 2 weeks

## 2023-03-03 PROBLEM — S76.111D: Status: ACTIVE | Noted: 2023-03-03

## 2024-05-11 ENCOUNTER — HOSPITAL ENCOUNTER (INPATIENT)
Age: 64
LOS: 2 days | Discharge: HOME HEALTH | DRG: 062 | End: 2024-05-13
Attending: EMERGENCY MEDICINE | Admitting: STUDENT IN AN ORGANIZED HEALTH CARE EDUCATION/TRAINING PROGRAM

## 2024-05-11 ENCOUNTER — APPOINTMENT (OUTPATIENT)
Dept: CT IMAGING | Age: 64
DRG: 062 | End: 2024-05-11
Attending: EMERGENCY MEDICINE

## 2024-05-11 DIAGNOSIS — I63.9 ACUTE ISCHEMIC STROKE  (CMD): Primary | ICD-10-CM

## 2024-05-11 DIAGNOSIS — R53.1 ACUTE LEFT-SIDED WEAKNESS: ICD-10-CM

## 2024-05-11 LAB
ABO + RH BLD: NORMAL
ALBUMIN SERPL-MCNC: 3.3 G/DL (ref 3.6–5.1)
ALBUMIN/GLOB SERPL: 1 {RATIO} (ref 1–2.4)
ALP SERPL-CCNC: 108 UNITS/L (ref 45–117)
ALT SERPL-CCNC: 12 UNITS/L
ANION GAP SERPL CALC-SCNC: 12 MMOL/L (ref 7–19)
ANION GAP SERPL CALC-SCNC: 9 MMOL/L (ref 7–19)
APTT PPP: <21 SEC (ref 22–32)
AST SERPL-CCNC: 8 UNITS/L
BASOPHILS # BLD: 0 K/MCL (ref 0–0.3)
BASOPHILS NFR BLD: 1 %
BILIRUB SERPL-MCNC: 0.2 MG/DL (ref 0.2–1)
BLD GP AB SCN SERPL QL GEL: NEGATIVE
BUN SERPL-MCNC: 4 MG/DL (ref 6–20)
BUN SERPL-MCNC: 6 MG/DL (ref 6–20)
BUN/CREAT SERPL: 4 (ref 7–25)
BUN/CREAT SERPL: 6 (ref 7–25)
CALCIUM SERPL-MCNC: 8.7 MG/DL (ref 8.4–10.2)
CALCIUM SERPL-MCNC: 8.8 MG/DL (ref 8.4–10.2)
CHLORIDE SERPL-SCNC: 100 MMOL/L (ref 97–110)
CHLORIDE SERPL-SCNC: 105 MMOL/L (ref 97–110)
CO2 SERPL-SCNC: 29 MMOL/L (ref 21–32)
CO2 SERPL-SCNC: 30 MMOL/L (ref 21–32)
CREAT SERPL-MCNC: 0.91 MG/DL (ref 0.51–0.95)
CREAT SERPL-MCNC: 0.94 MG/DL (ref 0.51–0.95)
DEPRECATED RDW RBC: 49.1 FL (ref 39–50)
EGFRCR SERPLBLD CKD-EPI 2021: 67 ML/MIN/{1.73_M2}
EGFRCR SERPLBLD CKD-EPI 2021: 70 ML/MIN/{1.73_M2}
EOSINOPHIL # BLD: 0 K/MCL (ref 0–0.5)
EOSINOPHIL NFR BLD: 0 %
ERYTHROCYTE [DISTWIDTH] IN BLOOD: 15.6 % (ref 11–15)
FASTING DURATION TIME PATIENT: ABNORMAL H
FASTING DURATION TIME PATIENT: ABNORMAL H
GLOBULIN SER-MCNC: 3.3 G/DL (ref 2–4)
GLUCOSE BLDC GLUCOMTR-MCNC: 109 MG/DL (ref 70–99)
GLUCOSE BLDC GLUCOMTR-MCNC: 110 MG/DL (ref 70–99)
GLUCOSE BLDC GLUCOMTR-MCNC: 163 MG/DL (ref 70–99)
GLUCOSE SERPL-MCNC: 106 MG/DL (ref 70–99)
GLUCOSE SERPL-MCNC: 155 MG/DL (ref 70–99)
HCT VFR BLD CALC: 37.1 % (ref 36–46.5)
HGB BLD-MCNC: 11.5 G/DL (ref 12–15.5)
IMM GRANULOCYTES # BLD AUTO: 0.1 K/MCL (ref 0–0.2)
IMM GRANULOCYTES # BLD: 1 %
INR PPP: 0.9
LYMPHOCYTES # BLD: 1.7 K/MCL (ref 1–4)
LYMPHOCYTES NFR BLD: 20 %
MAGNESIUM SERPL-MCNC: 2.1 MG/DL (ref 1.7–2.4)
MAGNESIUM SERPL-MCNC: 2.1 MG/DL (ref 1.7–2.4)
MCH RBC QN AUTO: 27.3 PG (ref 26–34)
MCHC RBC AUTO-ENTMCNC: 31 G/DL (ref 32–36.5)
MCV RBC AUTO: 88.1 FL (ref 78–100)
MONOCYTES # BLD: 0.4 K/MCL (ref 0.3–0.9)
MONOCYTES NFR BLD: 5 %
NEUTROPHILS # BLD: 6 K/MCL (ref 1.8–7.7)
NEUTROPHILS NFR BLD: 73 %
NRBC BLD MANUAL-RTO: 0 /100 WBC
PLATELET # BLD AUTO: 241 K/MCL (ref 140–450)
POTASSIUM SERPL-SCNC: 3.3 MMOL/L (ref 3.4–5.1)
POTASSIUM SERPL-SCNC: 3.7 MMOL/L (ref 3.4–5.1)
PROT SERPL-MCNC: 6.6 G/DL (ref 6.4–8.2)
PROTHROMBIN TIME: 10 SEC (ref 9.7–11.8)
RAINBOW EXTRA TUBES HOLD SPECIMEN: NORMAL
RAINBOW EXTRA TUBES HOLD SPECIMEN: NORMAL
RBC # BLD: 4.21 MIL/MCL (ref 4–5.2)
SODIUM SERPL-SCNC: 139 MMOL/L (ref 135–145)
SODIUM SERPL-SCNC: 139 MMOL/L (ref 135–145)
TROPONIN I SERPL DL<=0.01 NG/ML-MCNC: 7 NG/L
TYPE AND SCREEN EXPIRATION DATE: NORMAL
WBC # BLD: 8.3 K/MCL (ref 4.2–11)

## 2024-05-11 PROCEDURE — 93010 ELECTROCARDIOGRAM REPORT: CPT | Performed by: INTERNAL MEDICINE

## 2024-05-11 PROCEDURE — 80053 COMPREHEN METABOLIC PANEL: CPT | Performed by: EMERGENCY MEDICINE

## 2024-05-11 PROCEDURE — 84484 ASSAY OF TROPONIN QUANT: CPT | Performed by: EMERGENCY MEDICINE

## 2024-05-11 PROCEDURE — 99291 CRITICAL CARE FIRST HOUR: CPT

## 2024-05-11 PROCEDURE — 70450 CT HEAD/BRAIN W/O DYE: CPT

## 2024-05-11 PROCEDURE — 85730 THROMBOPLASTIN TIME PARTIAL: CPT | Performed by: EMERGENCY MEDICINE

## 2024-05-11 PROCEDURE — 10002803 HB RX 637: Performed by: EMERGENCY MEDICINE

## 2024-05-11 PROCEDURE — 10002800 HB RX 250 W HCPCS: Performed by: EMERGENCY MEDICINE

## 2024-05-11 PROCEDURE — 70496 CT ANGIOGRAPHY HEAD: CPT

## 2024-05-11 PROCEDURE — 10002805 HB CONTRAST AGENT: Performed by: EMERGENCY MEDICINE

## 2024-05-11 PROCEDURE — 36415 COLL VENOUS BLD VENIPUNCTURE: CPT

## 2024-05-11 PROCEDURE — 3E03317 INTRODUCTION OF OTHER THROMBOLYTIC INTO PERIPHERAL VEIN, PERCUTANEOUS APPROACH: ICD-10-PCS | Performed by: EMERGENCY MEDICINE

## 2024-05-11 PROCEDURE — 82962 GLUCOSE BLOOD TEST: CPT

## 2024-05-11 PROCEDURE — 83735 ASSAY OF MAGNESIUM: CPT | Performed by: EMERGENCY MEDICINE

## 2024-05-11 PROCEDURE — 80048 BASIC METABOLIC PNL TOTAL CA: CPT | Performed by: EMERGENCY MEDICINE

## 2024-05-11 PROCEDURE — 10000008 HB ROOM CHARGE ICU OR CCU

## 2024-05-11 PROCEDURE — 0042T CT CEREBRAL PERFUSION W CONTRAST LEVEL 1: CPT

## 2024-05-11 PROCEDURE — 10002800 HB RX 250 W HCPCS: Performed by: STUDENT IN AN ORGANIZED HEALTH CARE EDUCATION/TRAINING PROGRAM

## 2024-05-11 PROCEDURE — 86900 BLOOD TYPING SEROLOGIC ABO: CPT | Performed by: EMERGENCY MEDICINE

## 2024-05-11 PROCEDURE — 10004651 HB RX, NO CHARGE ITEM: Performed by: EMERGENCY MEDICINE

## 2024-05-11 PROCEDURE — 10002803 HB RX 637: Performed by: STUDENT IN AN ORGANIZED HEALTH CARE EDUCATION/TRAINING PROGRAM

## 2024-05-11 PROCEDURE — 85610 PROTHROMBIN TIME: CPT | Performed by: EMERGENCY MEDICINE

## 2024-05-11 PROCEDURE — 85025 COMPLETE CBC W/AUTO DIFF WBC: CPT | Performed by: EMERGENCY MEDICINE

## 2024-05-11 PROCEDURE — 93005 ELECTROCARDIOGRAM TRACING: CPT | Performed by: EMERGENCY MEDICINE

## 2024-05-11 PROCEDURE — 83036 HEMOGLOBIN GLYCOSYLATED A1C: CPT | Performed by: EMERGENCY MEDICINE

## 2024-05-11 RX ORDER — DIAZEPAM 5 MG/1
5 TABLET ORAL DAILY PRN
COMMUNITY
Start: 2024-04-15

## 2024-05-11 RX ORDER — GABAPENTIN 100 MG/1
100 CAPSULE ORAL 2 TIMES DAILY PRN
COMMUNITY
Start: 2023-12-13

## 2024-05-11 RX ORDER — ACETAMINOPHEN 650 MG/1
650 SUPPOSITORY RECTAL EVERY 4 HOURS PRN
Status: DISCONTINUED | OUTPATIENT
Start: 2024-05-11 | End: 2024-05-13 | Stop reason: HOSPADM

## 2024-05-11 RX ORDER — SODIUM CHLORIDE 9 MG/ML
INJECTION, SOLUTION INTRAVENOUS CONTINUOUS PRN
Status: DISCONTINUED | OUTPATIENT
Start: 2024-05-11 | End: 2024-05-12

## 2024-05-11 RX ORDER — VENLAFAXINE HYDROCHLORIDE 150 MG/1
150 CAPSULE, EXTENDED RELEASE ORAL DAILY
COMMUNITY
Start: 2024-03-04

## 2024-05-11 RX ORDER — 0.9 % SODIUM CHLORIDE 0.9 %
2 VIAL (ML) INJECTION EVERY 12 HOURS SCHEDULED
Status: DISCONTINUED | OUTPATIENT
Start: 2024-05-11 | End: 2024-05-12

## 2024-05-11 RX ORDER — POTASSIUM CHLORIDE 14.9 MG/ML
20 INJECTION INTRAVENOUS ONCE
Status: DISCONTINUED | OUTPATIENT
Start: 2024-05-11 | End: 2024-05-11

## 2024-05-11 RX ORDER — POTASSIUM CHLORIDE 1.5 G/1.58G
40 POWDER, FOR SOLUTION ORAL ONCE
Qty: 2 PACKET | Refills: 0 | Status: COMPLETED | OUTPATIENT
Start: 2024-05-11 | End: 2024-05-11

## 2024-05-11 RX ORDER — MONTELUKAST SODIUM 10 MG/1
10 TABLET ORAL NIGHTLY
COMMUNITY
Start: 2024-01-29

## 2024-05-11 RX ORDER — LAMOTRIGINE 200 MG/1
200 TABLET ORAL DAILY
COMMUNITY
Start: 2024-03-04

## 2024-05-11 RX ORDER — POTASSIUM CHLORIDE 1500 MG/1
20 TABLET, EXTENDED RELEASE ORAL 2 TIMES DAILY
COMMUNITY
Start: 2024-01-18

## 2024-05-11 RX ORDER — ATORVASTATIN CALCIUM 80 MG/1
80 TABLET, FILM COATED ORAL NIGHTLY
Status: DISCONTINUED | OUTPATIENT
Start: 2024-05-11 | End: 2024-05-12

## 2024-05-11 RX ORDER — LOSARTAN POTASSIUM 25 MG/1
25 TABLET ORAL DAILY
COMMUNITY
Start: 2024-03-25

## 2024-05-11 RX ORDER — LAMOTRIGINE 100 MG/1
200 TABLET ORAL DAILY
Status: DISCONTINUED | OUTPATIENT
Start: 2024-05-12 | End: 2024-05-13 | Stop reason: HOSPADM

## 2024-05-11 RX ORDER — OMEPRAZOLE 20 MG/1
20 CAPSULE, DELAYED RELEASE ORAL DAILY
COMMUNITY
Start: 2024-01-08

## 2024-05-11 RX ORDER — PANTOPRAZOLE SODIUM 40 MG/1
40 TABLET, DELAYED RELEASE ORAL
Status: DISCONTINUED | OUTPATIENT
Start: 2024-05-12 | End: 2024-05-13 | Stop reason: HOSPADM

## 2024-05-11 RX ORDER — IBUPROFEN/PSEUDOEPHEDRINE HCL 200MG-30MG
10 TABLET ORAL NIGHTLY PRN
Status: ON HOLD | COMMUNITY
Start: 2024-03-04 | End: 2024-05-12 | Stop reason: ALTCHOICE

## 2024-05-11 RX ORDER — DILTIAZEM HYDROCHLORIDE 180 MG/1
180 CAPSULE, EXTENDED RELEASE ORAL DAILY
COMMUNITY
Start: 2024-01-23

## 2024-05-11 RX ORDER — POTASSIUM CHLORIDE 20 MEQ/1
20 TABLET, EXTENDED RELEASE ORAL ONCE
Qty: 1 TABLET | Refills: 0 | Status: COMPLETED | OUTPATIENT
Start: 2024-05-11 | End: 2024-05-11

## 2024-05-11 RX ORDER — ACETAMINOPHEN 325 MG/1
650 TABLET ORAL EVERY 4 HOURS PRN
Status: DISCONTINUED | OUTPATIENT
Start: 2024-05-11 | End: 2024-05-13 | Stop reason: HOSPADM

## 2024-05-11 RX ORDER — POTASSIUM CHLORIDE 1.5 G/1.58G
20 POWDER, FOR SOLUTION ORAL ONCE
Status: COMPLETED | OUTPATIENT
Start: 2024-05-11 | End: 2024-05-11

## 2024-05-11 RX ORDER — LORATADINE 10 MG/1
1 TABLET ORAL DAILY
COMMUNITY
Start: 2024-01-29

## 2024-05-11 RX ADMIN — POTASSIUM CHLORIDE 20 MEQ: 1500 TABLET, EXTENDED RELEASE ORAL at 20:47

## 2024-05-11 RX ADMIN — POTASSIUM CHLORIDE 40 MEQ: 1.5 POWDER, FOR SOLUTION ORAL at 20:47

## 2024-05-11 RX ADMIN — SODIUM CHLORIDE, PRESERVATIVE FREE 2 ML: 5 INJECTION INTRAVENOUS at 20:47

## 2024-05-11 RX ADMIN — TENECTEPLASE 25 MG: KIT at 15:00

## 2024-05-11 RX ADMIN — POTASSIUM CHLORIDE 20 MEQ: 14.9 INJECTION, SOLUTION INTRAVENOUS at 18:50

## 2024-05-11 RX ADMIN — ATORVASTATIN CALCIUM 80 MG: 80 TABLET, FILM COATED ORAL at 20:47

## 2024-05-11 RX ADMIN — POTASSIUM CHLORIDE 20 MEQ: 1.5 POWDER, FOR SOLUTION ORAL at 23:59

## 2024-05-11 RX ADMIN — IOHEXOL 115 ML: 350 INJECTION, SOLUTION INTRAVENOUS at 15:09

## 2024-05-11 SDOH — ECONOMIC STABILITY: GENERAL: WOULD YOU LIKE HELP WITH ANY OF THE FOLLOWING NEEDS?: I DON'T WANT HELP WITH ANY OF THESE

## 2024-05-11 SDOH — SOCIAL STABILITY: SOCIAL INSECURITY: HOW OFTEN DOES ANYONE, INCLUDING FAMILY AND FRIENDS, THREATEN YOU WITH HARM?: NEVER

## 2024-05-11 SDOH — ECONOMIC STABILITY: GENERAL

## 2024-05-11 SDOH — ECONOMIC STABILITY: TRANSPORTATION INSECURITY
IN THE PAST 12 MONTHS, HAS LACK OF RELIABLE TRANSPORTATION KEPT YOU FROM MEDICAL APPOINTMENTS, MEETINGS, WORK OR FROM GETTING THINGS NEEDED FOR DAILY LIVING?: NO

## 2024-05-11 SDOH — SOCIAL STABILITY: SOCIAL NETWORK: SUPPORT SYSTEMS: CHILDREN;SPOUSE

## 2024-05-11 SDOH — ECONOMIC STABILITY: FOOD INSECURITY: WITHIN THE PAST 12 MONTHS, THE FOOD YOU BOUGHT JUST DIDN'T LAST AND YOU DIDN'T HAVE MONEY TO GET MORE.: NEVER TRUE

## 2024-05-11 SDOH — ECONOMIC STABILITY: INCOME INSECURITY: IN THE PAST 12 MONTHS, HAS THE ELECTRIC, GAS, OIL, OR WATER COMPANY THREATENED TO SHUT OFF SERVICE IN YOUR HOME?: NO

## 2024-05-11 SDOH — HEALTH STABILITY: GENERAL
BECAUSE OF A PHYSICAL, MENTAL, OR EMOTIONAL CONDITION, DO YOU HAVE SERIOUS DIFFICULTY CONCENTRATING, REMEMBERING OR MAKING DECISIONS?: NO

## 2024-05-11 SDOH — SOCIAL STABILITY: SOCIAL INSECURITY: HOW OFTEN DOES ANYONE, INCLUDING FAMILY AND FRIENDS, PHYSICALLY HURT YOU?: NEVER

## 2024-05-11 SDOH — SOCIAL STABILITY: SOCIAL INSECURITY: HOW OFTEN DOES ANYONE, INCLUDING FAMILY AND FRIENDS, SCREAM OR CURSE AT YOU?: NEVER

## 2024-05-11 SDOH — ECONOMIC STABILITY: HOUSING INSECURITY: DO YOU HAVE PROBLEMS WITH ANY OF THE FOLLOWING?: NONE OF THE ABOVE

## 2024-05-11 SDOH — HEALTH STABILITY: PHYSICAL HEALTH: DO YOU HAVE DIFFICULTY DRESSING OR BATHING?: NO

## 2024-05-11 SDOH — ECONOMIC STABILITY: HOUSING INSECURITY: WHAT IS YOUR LIVING SITUATION TODAY?: MOBILE HOME

## 2024-05-11 SDOH — ECONOMIC STABILITY: HOUSING INSECURITY: WHAT IS YOUR LIVING SITUATION TODAY?: SPOUSE

## 2024-05-11 SDOH — SOCIAL STABILITY: SOCIAL INSECURITY: HOW OFTEN DOES ANYONE, INCLUDING FAMILY AND FRIENDS, INSULT OR TALK DOWN TO YOU?: NEVER

## 2024-05-11 SDOH — HEALTH STABILITY: GENERAL: BECAUSE OF A PHYSICAL, MENTAL, OR EMOTIONAL CONDITION, DO YOU HAVE DIFFICULTY DOING ERRANDS ALONE?: NO

## 2024-05-11 SDOH — SOCIAL STABILITY: SOCIAL NETWORK
HOW OFTEN DO YOU SEE OR TALK TO PEOPLE THAT YOU CARE ABOUT AND FEEL CLOSE TO? (FOR EXAMPLE: TALKING TO FRIENDS ON THE PHONE, VISITING FRIENDS OR FAMILY, GOING TO CHURCH OR CLUB MEETINGS): 3 TO 5 TIMES A WEEK

## 2024-05-11 SDOH — SOCIAL STABILITY: SOCIAL NETWORK: SUPPORT SYSTEMS: SPOUSE

## 2024-05-11 SDOH — HEALTH STABILITY: PHYSICAL HEALTH: DO YOU HAVE SERIOUS DIFFICULTY WALKING OR CLIMBING STAIRS?: NO

## 2024-05-11 SDOH — ECONOMIC STABILITY: HOUSING INSECURITY: WHAT IS YOUR LIVING SITUATION TODAY?: I HAVE A STEADY PLACE TO LIVE

## 2024-05-11 SDOH — HEALTH STABILITY: PHYSICAL HEALTH: DO YOU HAVE SERIOUS DIFFICULTY WALKING OR CLIMBING STAIRS?: YES

## 2024-05-11 ASSESSMENT — ORIENTATION MEMORY CONCENTRATION TEST (OMCT)
OMCT INTERPRETATION: 11 OR GREATER: MODERATE TO SEVERE COGNITIVE IMPAIRMENT
SAY THE MONTHS IN REVERSE ORDER STARTING WITH LAST MONTH: 2 OR MORE ERRORS
OMCT SCORE: 16
WHAT YEAR IS IT NOW (MUST BE EXACT): CORRECT
COUNT BACKWARDS FROM 20 TO 1: 2 OR MORE ERRORS
REPEAT THE NAME AND ADDRESS I ASKED YOU TO REMEMBER: 4 ERRORS
WHAT TIME IS IT (NO WATCH OR CLOCK): CORRECT
WHAT MONTH IS IT NOW: CORRECT

## 2024-05-11 ASSESSMENT — LIFESTYLE VARIABLES
HOW MANY STANDARD DRINKS CONTAINING ALCOHOL DO YOU HAVE ON A TYPICAL DAY: 0,1 OR 2
HOW OFTEN DO YOU HAVE 6 OR MORE DRINKS ON ONE OCCASION: NEVER
ALCOHOL_USE_STATUS: NO OR LOW RISK WITH VALIDATED TOOL
HOW OFTEN DO YOU HAVE A DRINK CONTAINING ALCOHOL: NEVER
AUDIT-C TOTAL SCORE: 0

## 2024-05-11 ASSESSMENT — COLUMBIA-SUICIDE SEVERITY RATING SCALE - C-SSRS
6. HAVE YOU EVER DONE ANYTHING, STARTED TO DO ANYTHING, OR PREPARED TO DO ANYTHING TO END YOUR LIFE?: NO
2. HAVE YOU ACTUALLY HAD ANY THOUGHTS OF KILLING YOURSELF?: NO
1. WITHIN THE PAST MONTH, HAVE YOU WISHED YOU WERE DEAD OR WISHED YOU COULD GO TO SLEEP AND NOT WAKE UP?: NO
IS THE PATIENT ABLE TO COMPLETE C-SSRS: YES

## 2024-05-11 ASSESSMENT — PATIENT HEALTH QUESTIONNAIRE - PHQ9
CLINICAL INTERPRETATION OF PHQ2 SCORE: NO FURTHER SCREENING NEEDED
2. FEELING DOWN, DEPRESSED OR HOPELESS: NOT AT ALL
1. LITTLE INTEREST OR PLEASURE IN DOING THINGS: NOT AT ALL
SUM OF ALL RESPONSES TO PHQ9 QUESTIONS 1 AND 2: 0
SUM OF ALL RESPONSES TO PHQ9 QUESTIONS 1 AND 2: 0
IS PATIENT ABLE TO COMPLETE PHQ2 OR PHQ9: YES

## 2024-05-11 ASSESSMENT — PAIN SCALES - GENERAL
PAINLEVEL_OUTOF10: 9
PAINLEVEL_OUTOF10: 0
PAINLEVEL_OUTOF10: 0

## 2024-05-11 ASSESSMENT — ACTIVITIES OF DAILY LIVING (ADL)
ADL_SCORE: 12
RECENT_DECLINE_ADL: NO
ADL_BEFORE_ADMISSION: INDEPENDENT
ADL_SHORT_OF_BREATH: NO

## 2024-05-12 ENCOUNTER — APPOINTMENT (OUTPATIENT)
Dept: CT IMAGING | Age: 64
DRG: 062 | End: 2024-05-12
Attending: STUDENT IN AN ORGANIZED HEALTH CARE EDUCATION/TRAINING PROGRAM

## 2024-05-12 ENCOUNTER — APPOINTMENT (OUTPATIENT)
Dept: MRI IMAGING | Age: 64
DRG: 062 | End: 2024-05-12
Attending: STUDENT IN AN ORGANIZED HEALTH CARE EDUCATION/TRAINING PROGRAM

## 2024-05-12 LAB
ANION GAP SERPL CALC-SCNC: 11 MMOL/L (ref 7–19)
BASOPHILS # BLD: 0 K/MCL (ref 0–0.3)
BASOPHILS NFR BLD: 0 %
BUN SERPL-MCNC: 5 MG/DL (ref 6–20)
BUN/CREAT SERPL: 6 (ref 7–25)
CALCIUM SERPL-MCNC: 8.8 MG/DL (ref 8.4–10.2)
CHLORIDE SERPL-SCNC: 104 MMOL/L (ref 97–110)
CHOLEST SERPL-MCNC: 113 MG/DL
CHOLEST/HDLC SERPL: 2.2 {RATIO}
CO2 SERPL-SCNC: 30 MMOL/L (ref 21–32)
CREAT SERPL-MCNC: 0.89 MG/DL (ref 0.51–0.95)
DEPRECATED RDW RBC: 50.5 FL (ref 39–50)
EGFRCR SERPLBLD CKD-EPI 2021: 71 ML/MIN/{1.73_M2}
EOSINOPHIL # BLD: 0 K/MCL (ref 0–0.5)
EOSINOPHIL NFR BLD: 0 %
ERYTHROCYTE [DISTWIDTH] IN BLOOD: 15.6 % (ref 11–15)
FASTING DURATION TIME PATIENT: ABNORMAL H
GLUCOSE BLDC GLUCOMTR-MCNC: 103 MG/DL (ref 70–99)
GLUCOSE BLDC GLUCOMTR-MCNC: 116 MG/DL (ref 70–99)
GLUCOSE BLDC GLUCOMTR-MCNC: 124 MG/DL (ref 70–99)
GLUCOSE BLDC GLUCOMTR-MCNC: 140 MG/DL (ref 70–99)
GLUCOSE SERPL-MCNC: 100 MG/DL (ref 70–99)
HBA1C MFR BLD: 6.4 % (ref 4.5–5.6)
HCT VFR BLD CALC: 34.9 % (ref 36–46.5)
HDLC SERPL-MCNC: 51 MG/DL
HGB BLD-MCNC: 11 G/DL (ref 12–15.5)
IMM GRANULOCYTES # BLD AUTO: 0.1 K/MCL (ref 0–0.2)
IMM GRANULOCYTES # BLD: 1 %
LDLC SERPL CALC-MCNC: 35 MG/DL
LYMPHOCYTES # BLD: 2 K/MCL (ref 1–4)
LYMPHOCYTES NFR BLD: 25 %
MAGNESIUM SERPL-MCNC: 2.2 MG/DL (ref 1.7–2.4)
MCH RBC QN AUTO: 28.1 PG (ref 26–34)
MCHC RBC AUTO-ENTMCNC: 31.5 G/DL (ref 32–36.5)
MCV RBC AUTO: 89 FL (ref 78–100)
MONOCYTES # BLD: 0.4 K/MCL (ref 0.3–0.9)
MONOCYTES NFR BLD: 5 %
NEUTROPHILS # BLD: 5.8 K/MCL (ref 1.8–7.7)
NEUTROPHILS NFR BLD: 69 %
NONHDLC SERPL-MCNC: 62 MG/DL
NRBC BLD MANUAL-RTO: 0 /100 WBC
PHOSPHATE SERPL-MCNC: 3.3 MG/DL (ref 2.4–4.7)
PLATELET # BLD AUTO: 234 K/MCL (ref 140–450)
POTASSIUM SERPL-SCNC: 4 MMOL/L (ref 3.4–5.1)
RBC # BLD: 3.92 MIL/MCL (ref 4–5.2)
SODIUM SERPL-SCNC: 141 MMOL/L (ref 135–145)
TRIGL SERPL-MCNC: 135 MG/DL
WBC # BLD: 8.3 K/MCL (ref 4.2–11)

## 2024-05-12 PROCEDURE — 10002803 HB RX 637: Performed by: EMERGENCY MEDICINE

## 2024-05-12 PROCEDURE — 97162 PT EVAL MOD COMPLEX 30 MIN: CPT

## 2024-05-12 PROCEDURE — 84100 ASSAY OF PHOSPHORUS: CPT | Performed by: STUDENT IN AN ORGANIZED HEALTH CARE EDUCATION/TRAINING PROGRAM

## 2024-05-12 PROCEDURE — 80048 BASIC METABOLIC PNL TOTAL CA: CPT | Performed by: STUDENT IN AN ORGANIZED HEALTH CARE EDUCATION/TRAINING PROGRAM

## 2024-05-12 PROCEDURE — 85025 COMPLETE CBC W/AUTO DIFF WBC: CPT | Performed by: STUDENT IN AN ORGANIZED HEALTH CARE EDUCATION/TRAINING PROGRAM

## 2024-05-12 PROCEDURE — 10002801 HB RX 250 W/O HCPCS: Performed by: EMERGENCY MEDICINE

## 2024-05-12 PROCEDURE — 70551 MRI BRAIN STEM W/O DYE: CPT

## 2024-05-12 PROCEDURE — 97165 OT EVAL LOW COMPLEX 30 MIN: CPT

## 2024-05-12 PROCEDURE — 70450 CT HEAD/BRAIN W/O DYE: CPT

## 2024-05-12 PROCEDURE — 10002803 HB RX 637: Performed by: STUDENT IN AN ORGANIZED HEALTH CARE EDUCATION/TRAINING PROGRAM

## 2024-05-12 PROCEDURE — 97530 THERAPEUTIC ACTIVITIES: CPT

## 2024-05-12 PROCEDURE — 10004651 HB RX, NO CHARGE ITEM: Performed by: EMERGENCY MEDICINE

## 2024-05-12 PROCEDURE — 10006031 HB ROOM CHARGE TELEMETRY

## 2024-05-12 PROCEDURE — 10004651 HB RX, NO CHARGE ITEM: Performed by: STUDENT IN AN ORGANIZED HEALTH CARE EDUCATION/TRAINING PROGRAM

## 2024-05-12 PROCEDURE — 83735 ASSAY OF MAGNESIUM: CPT | Performed by: STUDENT IN AN ORGANIZED HEALTH CARE EDUCATION/TRAINING PROGRAM

## 2024-05-12 PROCEDURE — 80061 LIPID PANEL: CPT | Performed by: EMERGENCY MEDICINE

## 2024-05-12 PROCEDURE — 97535 SELF CARE MNGMENT TRAINING: CPT

## 2024-05-12 RX ORDER — NICOTINE POLACRILEX 4 MG
30 LOZENGE BUCCAL PRN
Status: DISCONTINUED | OUTPATIENT
Start: 2024-05-12 | End: 2024-05-13 | Stop reason: HOSPADM

## 2024-05-12 RX ORDER — NICOTINE POLACRILEX 4 MG
15 LOZENGE BUCCAL PRN
Status: DISCONTINUED | OUTPATIENT
Start: 2024-05-12 | End: 2024-05-13 | Stop reason: HOSPADM

## 2024-05-12 RX ORDER — DOCUSATE SODIUM 100 MG/1
100 CAPSULE, LIQUID FILLED ORAL DAILY
Status: DISCONTINUED | OUTPATIENT
Start: 2024-05-12 | End: 2024-05-13 | Stop reason: HOSPADM

## 2024-05-12 RX ORDER — LORATADINE 10 MG/1
10 TABLET ORAL DAILY
Status: DISCONTINUED | OUTPATIENT
Start: 2024-05-12 | End: 2024-05-13 | Stop reason: HOSPADM

## 2024-05-12 RX ORDER — QUETIAPINE FUMARATE 50 MG/1
50 TABLET, FILM COATED ORAL AT BEDTIME
COMMUNITY

## 2024-05-12 RX ORDER — DEXTROSE MONOHYDRATE 25 G/50ML
25 INJECTION, SOLUTION INTRAVENOUS PRN
Status: DISCONTINUED | OUTPATIENT
Start: 2024-05-12 | End: 2024-05-13 | Stop reason: HOSPADM

## 2024-05-12 RX ORDER — VENLAFAXINE HYDROCHLORIDE 75 MG/1
150 CAPSULE, EXTENDED RELEASE ORAL DAILY
Status: DISCONTINUED | OUTPATIENT
Start: 2024-05-12 | End: 2024-05-13 | Stop reason: HOSPADM

## 2024-05-12 RX ORDER — ROSUVASTATIN CALCIUM 40 MG/1
40 TABLET, COATED ORAL DAILY
COMMUNITY

## 2024-05-12 RX ORDER — DEXTROSE MONOHYDRATE 25 G/50ML
12.5 INJECTION, SOLUTION INTRAVENOUS PRN
Status: DISCONTINUED | OUTPATIENT
Start: 2024-05-12 | End: 2024-05-13 | Stop reason: HOSPADM

## 2024-05-12 RX ORDER — MONTELUKAST SODIUM 10 MG/1
10 TABLET ORAL NIGHTLY
Status: DISCONTINUED | OUTPATIENT
Start: 2024-05-12 | End: 2024-05-13 | Stop reason: HOSPADM

## 2024-05-12 RX ORDER — SEMAGLUTIDE 0.68 MG/ML
0.25 INJECTION, SOLUTION SUBCUTANEOUS
COMMUNITY
Start: 2024-05-09

## 2024-05-12 RX ORDER — ATORVASTATIN CALCIUM 20 MG/1
20 TABLET, FILM COATED ORAL NIGHTLY
Status: DISCONTINUED | OUTPATIENT
Start: 2024-05-12 | End: 2024-05-12

## 2024-05-12 RX ORDER — ROSUVASTATIN CALCIUM 20 MG/1
40 TABLET, COATED ORAL NIGHTLY
Status: DISCONTINUED | OUTPATIENT
Start: 2024-05-12 | End: 2024-05-13 | Stop reason: HOSPADM

## 2024-05-12 RX ORDER — TORSEMIDE 10 MG/1
10 TABLET ORAL DAILY
COMMUNITY

## 2024-05-12 RX ADMIN — ACETAMINOPHEN 650 MG: 325 TABLET ORAL at 16:04

## 2024-05-12 RX ADMIN — PANTOPRAZOLE SODIUM 40 MG: 40 TABLET, DELAYED RELEASE ORAL at 05:08

## 2024-05-12 RX ADMIN — SODIUM CHLORIDE, PRESERVATIVE FREE 2 ML: 5 INJECTION INTRAVENOUS at 09:25

## 2024-05-12 RX ADMIN — LORATADINE 10 MG: 10 TABLET ORAL at 12:38

## 2024-05-12 RX ADMIN — ROSUVASTATIN CALCIUM 40 MG: 20 TABLET, FILM COATED ORAL at 19:51

## 2024-05-12 RX ADMIN — SODIUM CHLORIDE, PRESERVATIVE FREE 2 ML: 5 INJECTION INTRAVENOUS at 19:55

## 2024-05-12 RX ADMIN — VENLAFAXINE HYDROCHLORIDE 150 MG: 75 CAPSULE, EXTENDED RELEASE ORAL at 12:38

## 2024-05-12 RX ADMIN — LAMOTRIGINE 200 MG: 100 TABLET ORAL at 09:25

## 2024-05-12 RX ADMIN — MONTELUKAST SODIUM 10 MG: 10 TABLET, FILM COATED ORAL at 19:51

## 2024-05-12 SDOH — ECONOMIC STABILITY: HOUSING INSECURITY: DO YOU HAVE PROBLEMS WITH ANY OF THE FOLLOWING?: NONE OF THE ABOVE

## 2024-05-12 SDOH — ECONOMIC STABILITY: HOUSING INSECURITY: WHAT IS YOUR LIVING SITUATION TODAY?: I HAVE A STEADY PLACE TO LIVE

## 2024-05-12 ASSESSMENT — PAIN SCALES - GENERAL
PAINLEVEL_OUTOF10: 0
PAINLEVEL_OUTOF10: 2
PAINLEVEL_OUTOF10: 0

## 2024-05-12 ASSESSMENT — COGNITIVE AND FUNCTIONAL STATUS - GENERAL
BASIC_MOBILITY_RAW_SCORE: 14
BASIC_MOBILITY_CONVERTED_SCORE: 35.55
DAILY_ACTIVITY_RAW_SCORE: 24
DAILY_ACTIVITY_CONVERTED_SCORE: 57.54

## 2024-05-12 ASSESSMENT — ACTIVITIES OF DAILY LIVING (ADL)
HOME_MANAGEMENT_TIME_ENTRY: 20
PRIOR_ADL: INDEPENDENT

## 2024-05-13 ENCOUNTER — APPOINTMENT (OUTPATIENT)
Dept: CARDIOLOGY | Age: 64
DRG: 062 | End: 2024-05-13
Attending: STUDENT IN AN ORGANIZED HEALTH CARE EDUCATION/TRAINING PROGRAM

## 2024-05-13 VITALS
TEMPERATURE: 97.6 F | WEIGHT: 211.2 LBS | OXYGEN SATURATION: 95 % | HEIGHT: 63 IN | BODY MASS INDEX: 37.42 KG/M2 | HEART RATE: 80 BPM | RESPIRATION RATE: 19 BRPM | DIASTOLIC BLOOD PRESSURE: 89 MMHG | SYSTOLIC BLOOD PRESSURE: 143 MMHG

## 2024-05-13 LAB
ANION GAP SERPL CALC-SCNC: 12 MMOL/L (ref 7–19)
AORTIC VALVE AREA (AVA): 0.98
AORTIC VALVE AREA: 2.43
ASCENDING AORTA (AAD): 3
ATRIAL RATE (BPM): 81
AV MEAN GRADIENT (AVMG): 4
AV MEAN VELOCITY (AVMV): 0.96
AV PEAK GRADIENT (AVPG): 9
AV PEAK VELOCITY (AVPV): 1.48
AV STENOSIS SEVERITY TEXT: NORMAL
AVI LVOT PEAK GRADIENT (LVOTMG): 1
BASOPHILS # BLD: 0 K/MCL (ref 0–0.3)
BASOPHILS NFR BLD: 1 %
BUN SERPL-MCNC: 7 MG/DL (ref 6–20)
BUN/CREAT SERPL: 7 (ref 7–25)
CALCIUM SERPL-MCNC: 9.2 MG/DL (ref 8.4–10.2)
CHLORIDE SERPL-SCNC: 105 MMOL/L (ref 97–110)
CO2 SERPL-SCNC: 29 MMOL/L (ref 21–32)
CREAT SERPL-MCNC: 1 MG/DL (ref 0.51–0.95)
DEPRECATED RDW RBC: 48.6 FL (ref 39–50)
E WAVE DECELARATION TIME (MDT): 16.76
EGFRCR SERPLBLD CKD-EPI 2021: 62 ML/MIN/{1.73_M2}
EOSINOPHIL # BLD: 0 K/MCL (ref 0–0.5)
EOSINOPHIL NFR BLD: 0 %
ERYTHROCYTE [DISTWIDTH] IN BLOOD: 15.2 % (ref 11–15)
FASTING DURATION TIME PATIENT: ABNORMAL H
GLUCOSE BLDC GLUCOMTR-MCNC: 136 MG/DL (ref 70–99)
GLUCOSE BLDC GLUCOMTR-MCNC: 141 MG/DL (ref 70–99)
GLUCOSE SERPL-MCNC: 118 MG/DL (ref 70–99)
HCT VFR BLD CALC: 37 % (ref 36–46.5)
HGB BLD-MCNC: 11.5 G/DL (ref 12–15.5)
IMM GRANULOCYTES # BLD AUTO: 0.1 K/MCL (ref 0–0.2)
IMM GRANULOCYTES # BLD: 1 %
INTERVENTRICULAR SEPTUM IN END DIASTOLE (IVSD): 1.73
LEFT INTERNAL DIMENSION IN SYSTOLE (LVSD): 0.9
LEFT VENTRICULAR INTERNAL DIMENSION IN DIASTOLE (LVDD): 3.9
LEFT VENTRICULAR POSTERIOR WALL IN END DIASTOLE (LVPW): 5.1
LV EF: NORMAL %
LVOT 2D (LVOTD): 22.5
LVOT VTI (LVOTVTI): 0.97
LYMPHOCYTES # BLD: 2 K/MCL (ref 1–4)
LYMPHOCYTES NFR BLD: 26 %
MAGNESIUM SERPL-MCNC: 2.2 MG/DL (ref 1.7–2.4)
MCH RBC QN AUTO: 27.3 PG (ref 26–34)
MCHC RBC AUTO-ENTMCNC: 31.1 G/DL (ref 32–36.5)
MCV RBC AUTO: 87.9 FL (ref 78–100)
MONOCYTES # BLD: 0.5 K/MCL (ref 0.3–0.9)
MONOCYTES NFR BLD: 6 %
MV E TISSUE VEL LAT (MELV): 1.11
MV E TISSUE VEL MED (MESV): 5.76
MV E WAVE VEL/E TISSUE VEL MED(MSR): 5.84
MV PEAK A VELOCITY (MVPAV): 173
MV PEAK E VELOCITY (MVPEV): 0.96
NEUTROPHILS # BLD: 5.1 K/MCL (ref 1.8–7.7)
NEUTROPHILS NFR BLD: 66 %
NRBC BLD MANUAL-RTO: 0 /100 WBC
P AXIS (DEGREES): 47
PHOSPHATE SERPL-MCNC: 3.7 MG/DL (ref 2.4–4.7)
PLATELET # BLD AUTO: 239 K/MCL (ref 140–450)
POTASSIUM SERPL-SCNC: 3.7 MMOL/L (ref 3.4–5.1)
PR-INTERVAL (MSEC): 218
QRS-INTERVAL (MSEC): 138
QT-INTERVAL (MSEC): 426
QTC: 494
R AXIS (DEGREES): 55
RBC # BLD: 4.21 MIL/MCL (ref 4–5.2)
REPORT TEXT: NORMAL
RV END SYSTOLIC LONGITUDINAL STRAIN FREE WALL (RVGS): 2.1
SODIUM SERPL-SCNC: 142 MMOL/L (ref 135–145)
T AXIS (DEGREES): 32
TRICUSPID VALVE ANNULAR PEAK VELOCITY (TVAPV): 19
TV ESTIMATED RIGHT ARTERIAL PRESSURE (RAP): 8.35
VENTRICULAR RATE EKG/MIN (BPM): 81
WBC # BLD: 7.7 K/MCL (ref 4.2–11)

## 2024-05-13 PROCEDURE — 80048 BASIC METABOLIC PNL TOTAL CA: CPT | Performed by: STUDENT IN AN ORGANIZED HEALTH CARE EDUCATION/TRAINING PROGRAM

## 2024-05-13 PROCEDURE — 93306 TTE W/DOPPLER COMPLETE: CPT | Performed by: INTERNAL MEDICINE

## 2024-05-13 PROCEDURE — 10004651 HB RX, NO CHARGE ITEM: Performed by: STUDENT IN AN ORGANIZED HEALTH CARE EDUCATION/TRAINING PROGRAM

## 2024-05-13 PROCEDURE — 85025 COMPLETE CBC W/AUTO DIFF WBC: CPT | Performed by: STUDENT IN AN ORGANIZED HEALTH CARE EDUCATION/TRAINING PROGRAM

## 2024-05-13 PROCEDURE — 10002803 HB RX 637: Performed by: STUDENT IN AN ORGANIZED HEALTH CARE EDUCATION/TRAINING PROGRAM

## 2024-05-13 PROCEDURE — 10002803 HB RX 637: Performed by: EMERGENCY MEDICINE

## 2024-05-13 PROCEDURE — 93306 TTE W/DOPPLER COMPLETE: CPT

## 2024-05-13 PROCEDURE — 10002801 HB RX 250 W/O HCPCS: Performed by: EMERGENCY MEDICINE

## 2024-05-13 PROCEDURE — 84100 ASSAY OF PHOSPHORUS: CPT | Performed by: STUDENT IN AN ORGANIZED HEALTH CARE EDUCATION/TRAINING PROGRAM

## 2024-05-13 PROCEDURE — 10002805 HB CONTRAST AGENT: Performed by: INTERNAL MEDICINE

## 2024-05-13 PROCEDURE — 36415 COLL VENOUS BLD VENIPUNCTURE: CPT | Performed by: STUDENT IN AN ORGANIZED HEALTH CARE EDUCATION/TRAINING PROGRAM

## 2024-05-13 PROCEDURE — 83735 ASSAY OF MAGNESIUM: CPT | Performed by: STUDENT IN AN ORGANIZED HEALTH CARE EDUCATION/TRAINING PROGRAM

## 2024-05-13 RX ORDER — ASPIRIN 81 MG/1
81 TABLET ORAL DAILY
Status: DISCONTINUED | OUTPATIENT
Start: 2024-05-13 | End: 2024-05-13

## 2024-05-13 RX ADMIN — VENLAFAXINE HYDROCHLORIDE 150 MG: 75 CAPSULE, EXTENDED RELEASE ORAL at 09:19

## 2024-05-13 RX ADMIN — LAMOTRIGINE 200 MG: 100 TABLET ORAL at 09:19

## 2024-05-13 RX ADMIN — PANTOPRAZOLE SODIUM 40 MG: 40 TABLET, DELAYED RELEASE ORAL at 05:03

## 2024-05-13 RX ADMIN — DOCUSATE SODIUM 100 MG: 100 CAPSULE, LIQUID FILLED ORAL at 09:19

## 2024-05-13 RX ADMIN — ACETAMINOPHEN 650 MG: 325 TABLET ORAL at 05:03

## 2024-05-13 RX ADMIN — PERFLUTREN 2 ML: 6.52 INJECTION, SUSPENSION INTRAVENOUS at 12:10

## 2024-05-13 RX ADMIN — LORATADINE 10 MG: 10 TABLET ORAL at 09:19

## 2024-05-13 ASSESSMENT — PAIN SCALES - GENERAL
PAINLEVEL_OUTOF10: 2
PAINLEVEL_OUTOF10: 0

## 2024-05-15 ENCOUNTER — CASE MANAGEMENT (OUTPATIENT)
Dept: CARE COORDINATION | Age: 64
End: 2024-05-15

## 2024-05-15 ENCOUNTER — TELEPHONE (OUTPATIENT)
Dept: CARE COORDINATION | Age: 64
End: 2024-05-15

## 2024-05-22 ENCOUNTER — TELEPHONE (OUTPATIENT)
Dept: CARE COORDINATION | Age: 64
End: 2024-05-22

## 2024-05-23 ENCOUNTER — TELEPHONE (OUTPATIENT)
Dept: CARE COORDINATION | Age: 64
End: 2024-05-23

## 2024-05-29 ENCOUNTER — TELEPHONE (OUTPATIENT)
Dept: CARE COORDINATION | Age: 64
End: 2024-05-29

## 2024-05-30 ENCOUNTER — TELEPHONE (OUTPATIENT)
Dept: CARE COORDINATION | Age: 64
End: 2024-05-30

## 2024-06-05 ENCOUNTER — TELEPHONE (OUTPATIENT)
Dept: CARE COORDINATION | Age: 64
End: 2024-06-05

## 2024-06-12 ENCOUNTER — TELEPHONE (OUTPATIENT)
Dept: CARE COORDINATION | Age: 64
End: 2024-06-12

## 2024-08-13 ENCOUNTER — APPOINTMENT (OUTPATIENT)
Dept: CT IMAGING | Age: 64
End: 2024-08-13
Attending: EMERGENCY MEDICINE

## 2024-08-13 ENCOUNTER — HOSPITAL ENCOUNTER (OUTPATIENT)
Age: 64
Setting detail: OBSERVATION
Discharge: HOME | End: 2024-08-15
Attending: EMERGENCY MEDICINE | Admitting: INTERNAL MEDICINE

## 2024-08-13 DIAGNOSIS — E87.6 HYPOKALEMIA: Primary | ICD-10-CM

## 2024-08-13 LAB
ALBUMIN SERPL-MCNC: 3.5 G/DL (ref 3.6–5.1)
ALBUMIN/GLOB SERPL: 0.9 {RATIO} (ref 1–2.4)
ALP SERPL-CCNC: 161 UNITS/L (ref 45–117)
ALT SERPL-CCNC: 20 UNITS/L
ANION GAP SERPL CALC-SCNC: 13 MMOL/L (ref 7–19)
AST SERPL-CCNC: 12 UNITS/L
BASOPHILS # BLD: 0.1 K/MCL (ref 0–0.3)
BASOPHILS NFR BLD: 0 %
BILIRUB SERPL-MCNC: 0.5 MG/DL (ref 0.2–1)
BUN SERPL-MCNC: 14 MG/DL (ref 6–20)
BUN/CREAT SERPL: 9 (ref 7–25)
CALCIUM SERPL-MCNC: 9.5 MG/DL (ref 8.4–10.2)
CHLORIDE SERPL-SCNC: 89 MMOL/L (ref 97–110)
CO2 SERPL-SCNC: 32 MMOL/L (ref 21–32)
CREAT SERPL-MCNC: 1.59 MG/DL (ref 0.51–0.95)
DEPRECATED RDW RBC: 44.8 FL (ref 39–50)
EGFRCR SERPLBLD CKD-EPI 2021: 36 ML/MIN/{1.73_M2}
EOSINOPHIL # BLD: 0 K/MCL (ref 0–0.5)
EOSINOPHIL NFR BLD: 0 %
ERYTHROCYTE [DISTWIDTH] IN BLOOD: 14.3 % (ref 11–15)
FASTING DURATION TIME PATIENT: ABNORMAL H
GLOBULIN SER-MCNC: 3.9 G/DL (ref 2–4)
GLUCOSE BLDC GLUCOMTR-MCNC: 174 MG/DL (ref 70–99)
GLUCOSE SERPL-MCNC: 165 MG/DL (ref 70–99)
HCT VFR BLD CALC: 42.9 % (ref 36–46.5)
HGB BLD-MCNC: 14.4 G/DL (ref 12–15.5)
IMM GRANULOCYTES # BLD AUTO: 0.1 K/MCL (ref 0–0.2)
IMM GRANULOCYTES # BLD: 1 %
LYMPHOCYTES # BLD: 2 K/MCL (ref 1–4)
LYMPHOCYTES NFR BLD: 12 %
MAGNESIUM SERPL-MCNC: 2.4 MG/DL (ref 1.7–2.4)
MCH RBC QN AUTO: 28.7 PG (ref 26–34)
MCHC RBC AUTO-ENTMCNC: 33.6 G/DL (ref 32–36.5)
MCV RBC AUTO: 85.5 FL (ref 78–100)
MONOCYTES # BLD: 0.7 K/MCL (ref 0.3–0.9)
MONOCYTES NFR BLD: 5 %
NEUTROPHILS # BLD: 13.2 K/MCL (ref 1.8–7.7)
NEUTROPHILS NFR BLD: 82 %
NRBC BLD MANUAL-RTO: 0 /100 WBC
PLATELET # BLD AUTO: 322 K/MCL (ref 140–450)
POTASSIUM SERPL-SCNC: 2.2 MMOL/L (ref 3.4–5.1)
PROT SERPL-MCNC: 7.4 G/DL (ref 6.4–8.2)
RBC # BLD: 5.02 MIL/MCL (ref 4–5.2)
SODIUM SERPL-SCNC: 132 MMOL/L (ref 135–145)
TROPONIN I SERPL DL<=0.01 NG/ML-MCNC: 27 NG/L
WBC # BLD: 16.1 K/MCL (ref 4.2–11)

## 2024-08-13 PROCEDURE — 70450 CT HEAD/BRAIN W/O DYE: CPT

## 2024-08-13 PROCEDURE — 99285 EMERGENCY DEPT VISIT HI MDM: CPT

## 2024-08-13 PROCEDURE — 80053 COMPREHEN METABOLIC PANEL: CPT | Performed by: EMERGENCY MEDICINE

## 2024-08-13 PROCEDURE — 93005 ELECTROCARDIOGRAM TRACING: CPT | Performed by: EMERGENCY MEDICINE

## 2024-08-13 PROCEDURE — 83735 ASSAY OF MAGNESIUM: CPT | Performed by: EMERGENCY MEDICINE

## 2024-08-13 PROCEDURE — 82962 GLUCOSE BLOOD TEST: CPT

## 2024-08-13 PROCEDURE — 96375 TX/PRO/DX INJ NEW DRUG ADDON: CPT

## 2024-08-13 PROCEDURE — 10002800 HB RX 250 W HCPCS: Performed by: EMERGENCY MEDICINE

## 2024-08-13 PROCEDURE — 10002803 HB RX 637: Performed by: EMERGENCY MEDICINE

## 2024-08-13 PROCEDURE — 96365 THER/PROPH/DIAG IV INF INIT: CPT

## 2024-08-13 PROCEDURE — 84484 ASSAY OF TROPONIN QUANT: CPT | Performed by: EMERGENCY MEDICINE

## 2024-08-13 PROCEDURE — 10002807 HB RX 258: Performed by: EMERGENCY MEDICINE

## 2024-08-13 PROCEDURE — G0378 HOSPITAL OBSERVATION PER HR: HCPCS

## 2024-08-13 PROCEDURE — 93010 ELECTROCARDIOGRAM REPORT: CPT | Performed by: INTERNAL MEDICINE

## 2024-08-13 PROCEDURE — 96366 THER/PROPH/DIAG IV INF ADDON: CPT

## 2024-08-13 PROCEDURE — 85025 COMPLETE CBC W/AUTO DIFF WBC: CPT | Performed by: EMERGENCY MEDICINE

## 2024-08-13 PROCEDURE — 10002807 HB RX 258: Performed by: INTERNAL MEDICINE

## 2024-08-13 RX ORDER — DIPHENHYDRAMINE HYDROCHLORIDE 50 MG/ML
25 INJECTION INTRAMUSCULAR; INTRAVENOUS ONCE
Status: COMPLETED | OUTPATIENT
Start: 2024-08-13 | End: 2024-08-13

## 2024-08-13 RX ORDER — 0.9 % SODIUM CHLORIDE 0.9 %
2 VIAL (ML) INJECTION EVERY 12 HOURS SCHEDULED
Status: DISCONTINUED | OUTPATIENT
Start: 2024-08-13 | End: 2024-08-15 | Stop reason: HOSPADM

## 2024-08-13 RX ORDER — BISACODYL 10 MG
10 SUPPOSITORY, RECTAL RECTAL DAILY PRN
Status: DISCONTINUED | OUTPATIENT
Start: 2024-08-13 | End: 2024-08-15 | Stop reason: HOSPADM

## 2024-08-13 RX ORDER — AMOXICILLIN 250 MG
2 CAPSULE ORAL 2 TIMES DAILY PRN
Status: DISCONTINUED | OUTPATIENT
Start: 2024-08-13 | End: 2024-08-15 | Stop reason: HOSPADM

## 2024-08-13 RX ORDER — HEPARIN SODIUM 5000 [USP'U]/ML
5000 INJECTION, SOLUTION INTRAVENOUS; SUBCUTANEOUS EVERY 8 HOURS SCHEDULED
Status: DISCONTINUED | OUTPATIENT
Start: 2024-08-14 | End: 2024-08-15 | Stop reason: HOSPADM

## 2024-08-13 RX ORDER — POLYETHYLENE GLYCOL 3350 17 G/17G
17 POWDER, FOR SOLUTION ORAL DAILY PRN
Status: DISCONTINUED | OUTPATIENT
Start: 2024-08-13 | End: 2024-08-15 | Stop reason: HOSPADM

## 2024-08-13 RX ORDER — LANOLIN ALCOHOL/MO/W.PET/CERES
3 CREAM (GRAM) TOPICAL NIGHTLY PRN
Status: DISCONTINUED | OUTPATIENT
Start: 2024-08-13 | End: 2024-08-15 | Stop reason: HOSPADM

## 2024-08-13 RX ORDER — SODIUM CHLORIDE 9 MG/ML
INJECTION, SOLUTION INTRAVENOUS CONTINUOUS
Status: DISCONTINUED | OUTPATIENT
Start: 2024-08-13 | End: 2024-08-15 | Stop reason: HOSPADM

## 2024-08-13 RX ORDER — POTASSIUM CHLORIDE 14.9 MG/ML
20 INJECTION INTRAVENOUS ONCE
Status: COMPLETED | OUTPATIENT
Start: 2024-08-13 | End: 2024-08-14

## 2024-08-13 RX ORDER — ACETAMINOPHEN 325 MG/1
650 TABLET ORAL EVERY 4 HOURS PRN
Status: DISCONTINUED | OUTPATIENT
Start: 2024-08-13 | End: 2024-08-15 | Stop reason: HOSPADM

## 2024-08-13 RX ORDER — ONDANSETRON 4 MG/1
4 TABLET, ORALLY DISINTEGRATING ORAL EVERY 12 HOURS PRN
Status: DISCONTINUED | OUTPATIENT
Start: 2024-08-13 | End: 2024-08-14

## 2024-08-13 RX ORDER — ONDANSETRON 2 MG/ML
4 INJECTION INTRAMUSCULAR; INTRAVENOUS ONCE
Status: COMPLETED | OUTPATIENT
Start: 2024-08-13 | End: 2024-08-13

## 2024-08-13 RX ORDER — ACETAMINOPHEN 650 MG/1
650 SUPPOSITORY RECTAL EVERY 4 HOURS PRN
Status: DISCONTINUED | OUTPATIENT
Start: 2024-08-13 | End: 2024-08-15 | Stop reason: HOSPADM

## 2024-08-13 RX ORDER — POTASSIUM CHLORIDE 1500 MG/1
40 TABLET, EXTENDED RELEASE ORAL ONCE
Status: COMPLETED | OUTPATIENT
Start: 2024-08-13 | End: 2024-08-13

## 2024-08-13 RX ORDER — ONDANSETRON 2 MG/ML
4 INJECTION INTRAMUSCULAR; INTRAVENOUS EVERY 12 HOURS PRN
Status: DISCONTINUED | OUTPATIENT
Start: 2024-08-13 | End: 2024-08-14

## 2024-08-13 RX ORDER — POTASSIUM CHLORIDE 14.9 MG/ML
20 INJECTION INTRAVENOUS ONCE
Status: COMPLETED | OUTPATIENT
Start: 2024-08-13 | End: 2024-08-13

## 2024-08-13 RX ADMIN — SODIUM CHLORIDE 1000 ML: 9 INJECTION, SOLUTION INTRAVENOUS at 18:57

## 2024-08-13 RX ADMIN — MORPHINE SULFATE 2 MG: 2 INJECTION, SOLUTION INTRAMUSCULAR; INTRAVENOUS at 18:59

## 2024-08-13 RX ADMIN — POTASSIUM CHLORIDE 20 MEQ: 14.9 INJECTION, SOLUTION INTRAVENOUS at 21:09

## 2024-08-13 RX ADMIN — DIPHENHYDRAMINE HYDROCHLORIDE 25 MG: 50 INJECTION, SOLUTION INTRAMUSCULAR; INTRAVENOUS at 18:57

## 2024-08-13 RX ADMIN — POTASSIUM CHLORIDE 20 MEQ: 14.9 INJECTION, SOLUTION INTRAVENOUS at 18:24

## 2024-08-13 RX ADMIN — SODIUM CHLORIDE: 9 INJECTION, SOLUTION INTRAVENOUS at 23:04

## 2024-08-13 RX ADMIN — ONDANSETRON 4 MG: 2 INJECTION INTRAMUSCULAR; INTRAVENOUS at 18:59

## 2024-08-13 RX ADMIN — POTASSIUM CHLORIDE 40 MEQ: 1500 TABLET, EXTENDED RELEASE ORAL at 20:17

## 2024-08-13 SDOH — SOCIAL STABILITY: SOCIAL INSECURITY: HOW OFTEN DOES ANYONE, INCLUDING FAMILY AND FRIENDS, PHYSICALLY HURT YOU?: NEVER

## 2024-08-13 SDOH — ECONOMIC STABILITY: TRANSPORTATION INSECURITY
IN THE PAST 12 MONTHS, HAS LACK OF RELIABLE TRANSPORTATION KEPT YOU FROM MEDICAL APPOINTMENTS, MEETINGS, WORK OR FROM GETTING THINGS NEEDED FOR DAILY LIVING?: YES

## 2024-08-13 SDOH — ECONOMIC STABILITY: FOOD INSECURITY: WITHIN THE PAST 12 MONTHS, THE FOOD YOU BOUGHT JUST DIDN'T LAST AND YOU DIDN'T HAVE MONEY TO GET MORE.: NEVER TRUE

## 2024-08-13 SDOH — ECONOMIC STABILITY: HOUSING INSECURITY: WHAT IS YOUR LIVING SITUATION TODAY?: SPOUSE

## 2024-08-13 SDOH — ECONOMIC STABILITY: INCOME INSECURITY: IN THE PAST 12 MONTHS, HAS THE ELECTRIC, GAS, OIL, OR WATER COMPANY THREATENED TO SHUT OFF SERVICE IN YOUR HOME?: YES

## 2024-08-13 SDOH — ECONOMIC STABILITY: HOUSING INSECURITY: WHAT IS YOUR LIVING SITUATION TODAY?: I HAVE A STEADY PLACE TO LIVE

## 2024-08-13 SDOH — SOCIAL STABILITY: SOCIAL INSECURITY: HOW OFTEN DOES ANYONE, INCLUDING FAMILY AND FRIENDS, SCREAM OR CURSE AT YOU?: NEVER

## 2024-08-13 SDOH — SOCIAL STABILITY: SOCIAL INSECURITY: HOW OFTEN DOES ANYONE, INCLUDING FAMILY AND FRIENDS, INSULT OR TALK DOWN TO YOU?: NEVER

## 2024-08-13 SDOH — HEALTH STABILITY: GENERAL: BECAUSE OF A PHYSICAL, MENTAL, OR EMOTIONAL CONDITION, DO YOU HAVE DIFFICULTY DOING ERRANDS ALONE?: NO

## 2024-08-13 SDOH — SOCIAL STABILITY: SOCIAL INSECURITY: HOW OFTEN DOES ANYONE, INCLUDING FAMILY AND FRIENDS, THREATEN YOU WITH HARM?: NEVER

## 2024-08-13 SDOH — ECONOMIC STABILITY: GENERAL

## 2024-08-13 SDOH — HEALTH STABILITY: PHYSICAL HEALTH: DO YOU HAVE SERIOUS DIFFICULTY WALKING OR CLIMBING STAIRS?: NO

## 2024-08-13 SDOH — SOCIAL STABILITY: SOCIAL NETWORK: SUPPORT SYSTEMS: CHILDREN;FRIENDS

## 2024-08-13 SDOH — ECONOMIC STABILITY: HOUSING INSECURITY: WHAT IS YOUR LIVING SITUATION TODAY?: MOBILE HOME

## 2024-08-13 SDOH — ECONOMIC STABILITY: GENERAL: WOULD YOU LIKE HELP WITH ANY OF THE FOLLOWING NEEDS?: I DON'T WANT HELP WITH ANY OF THESE

## 2024-08-13 SDOH — HEALTH STABILITY: PHYSICAL HEALTH: DO YOU HAVE DIFFICULTY DRESSING OR BATHING?: NO

## 2024-08-13 SDOH — ECONOMIC STABILITY: HOUSING INSECURITY: DO YOU HAVE PROBLEMS WITH ANY OF THE FOLLOWING?: NONE OF THE ABOVE

## 2024-08-13 ASSESSMENT — LIFESTYLE VARIABLES
HOW MANY STANDARD DRINKS CONTAINING ALCOHOL DO YOU HAVE ON A TYPICAL DAY: 0,1 OR 2
HOW MANY STANDARD DRINKS CONTAINING ALCOHOL DO YOU HAVE ON A TYPICAL DAY: 0,1 OR 2
AUDIT-C TOTAL SCORE: 0
HOW OFTEN DO YOU HAVE A DRINK CONTAINING ALCOHOL: NEVER
ALCOHOL_USE_STATUS: NO OR LOW RISK WITH VALIDATED TOOL
HOW OFTEN DO YOU HAVE 6 OR MORE DRINKS ON ONE OCCASION: NEVER
ALCOHOL_USE_STATUS: NO OR LOW RISK WITH VALIDATED TOOL
HOW OFTEN DO YOU HAVE 6 OR MORE DRINKS ON ONE OCCASION: NEVER
HOW OFTEN DO YOU HAVE A DRINK CONTAINING ALCOHOL: NEVER
AUDIT-C TOTAL SCORE: 0

## 2024-08-13 ASSESSMENT — ORIENTATION MEMORY CONCENTRATION TEST (OMCT)
SAY THE MONTHS IN REVERSE ORDER STARTING WITH LAST MONTH: CORRECT
REPEAT THE NAME AND ADDRESS I ASKED YOU TO REMEMBER: CORRECT
WHAT MONTH IS IT NOW: CORRECT
OMCT INTERPRETATION: 0-6: NO SIGNIFICANT IMPAIRMENT
COUNT BACKWARDS FROM 20 TO 1: CORRECT
OMCT SCORE: 0
WHAT TIME IS IT (NO WATCH OR CLOCK): CORRECT
WHAT YEAR IS IT NOW (MUST BE EXACT): CORRECT

## 2024-08-13 ASSESSMENT — ACTIVITIES OF DAILY LIVING (ADL)
ADL_BEFORE_ADMISSION: INDEPENDENT
RECENT_DECLINE_ADL: NO
ADL_BEFORE_ADMISSION: INDEPENDENT
ADL_SHORT_OF_BREATH: NO
ADL_SCORE: 12
ADL_SCORE: 12

## 2024-08-13 ASSESSMENT — PAIN SCALES - GENERAL
PAINLEVEL_OUTOF10: 3
PAINLEVEL_OUTOF10: 6

## 2024-08-13 ASSESSMENT — PATIENT HEALTH QUESTIONNAIRE - PHQ9
2. FEELING DOWN, DEPRESSED OR HOPELESS: NOT AT ALL
CLINICAL INTERPRETATION OF PHQ2 SCORE: NO FURTHER SCREENING NEEDED
IS PATIENT ABLE TO COMPLETE PHQ2 OR PHQ9: YES
SUM OF ALL RESPONSES TO PHQ9 QUESTIONS 1 AND 2: 0
1. LITTLE INTEREST OR PLEASURE IN DOING THINGS: NOT AT ALL
SUM OF ALL RESPONSES TO PHQ9 QUESTIONS 1 AND 2: 0

## 2024-08-13 ASSESSMENT — COLUMBIA-SUICIDE SEVERITY RATING SCALE - C-SSRS
IS THE PATIENT ABLE TO COMPLETE C-SSRS: YES
2. HAVE YOU ACTUALLY HAD ANY THOUGHTS OF KILLING YOURSELF?: NO
1. WITHIN THE PAST MONTH, HAVE YOU WISHED YOU WERE DEAD OR WISHED YOU COULD GO TO SLEEP AND NOT WAKE UP?: NO
6. HAVE YOU EVER DONE ANYTHING, STARTED TO DO ANYTHING, OR PREPARED TO DO ANYTHING TO END YOUR LIFE?: NO

## 2024-08-13 ASSESSMENT — MOVEMENT AND STRENGTH ASSESSMENTS: HEAD_NECK: FULL RANGE OF MOTION

## 2024-08-14 LAB
ALBUMIN SERPL-MCNC: 2.6 G/DL (ref 3.6–5.1)
ALBUMIN/GLOB SERPL: 0.8 {RATIO} (ref 1–2.4)
ALP SERPL-CCNC: 118 UNITS/L (ref 45–117)
ALT SERPL-CCNC: 14 UNITS/L
ANION GAP SERPL CALC-SCNC: 12 MMOL/L (ref 7–19)
AST SERPL-CCNC: 13 UNITS/L
ATRIAL RATE (BPM): 85
BASOPHILS # BLD: 0 K/MCL (ref 0–0.3)
BASOPHILS NFR BLD: 0 %
BILIRUB SERPL-MCNC: 0.2 MG/DL (ref 0.2–1)
BUN SERPL-MCNC: 10 MG/DL (ref 6–20)
BUN/CREAT SERPL: 8 (ref 7–25)
CALCIUM SERPL-MCNC: 8.5 MG/DL (ref 8.4–10.2)
CHLORIDE SERPL-SCNC: 102 MMOL/L (ref 97–110)
CO2 SERPL-SCNC: 27 MMOL/L (ref 21–32)
CREAT SERPL-MCNC: 1.31 MG/DL (ref 0.51–0.95)
DEPRECATED RDW RBC: 46.9 FL (ref 39–50)
EGFRCR SERPLBLD CKD-EPI 2021: 45 ML/MIN/{1.73_M2}
EOSINOPHIL # BLD: 0 K/MCL (ref 0–0.5)
EOSINOPHIL NFR BLD: 0 %
ERYTHROCYTE [DISTWIDTH] IN BLOOD: 14.5 % (ref 11–15)
FASTING DURATION TIME PATIENT: ABNORMAL H
GLOBULIN SER-MCNC: 3.2 G/DL (ref 2–4)
GLUCOSE BLDC GLUCOMTR-MCNC: 107 MG/DL (ref 70–99)
GLUCOSE BLDC GLUCOMTR-MCNC: 130 MG/DL (ref 70–99)
GLUCOSE BLDC GLUCOMTR-MCNC: 152 MG/DL (ref 70–99)
GLUCOSE BLDC GLUCOMTR-MCNC: 177 MG/DL (ref 70–99)
GLUCOSE SERPL-MCNC: 130 MG/DL (ref 70–99)
HCT VFR BLD CALC: 34.8 % (ref 36–46.5)
HGB BLD-MCNC: 11.3 G/DL (ref 12–15.5)
IMM GRANULOCYTES # BLD AUTO: 0.1 K/MCL (ref 0–0.2)
IMM GRANULOCYTES # BLD: 1 %
LYMPHOCYTES # BLD: 2 K/MCL (ref 1–4)
LYMPHOCYTES NFR BLD: 21 %
MCH RBC QN AUTO: 28.6 PG (ref 26–34)
MCHC RBC AUTO-ENTMCNC: 32.7 G/DL (ref 32–36.5)
MCV RBC AUTO: 87.6 FL (ref 78–100)
MONOCYTES # BLD: 0.4 K/MCL (ref 0.3–0.9)
MONOCYTES NFR BLD: 5 %
NEUTROPHILS # BLD: 6.8 K/MCL (ref 1.8–7.7)
NEUTROPHILS NFR BLD: 73 %
NRBC BLD MANUAL-RTO: 0 /100 WBC
P AXIS (DEGREES): 70
PLATELET # BLD AUTO: 242 K/MCL (ref 140–450)
POTASSIUM SERPL-SCNC: 3 MMOL/L (ref 3.4–5.1)
POTASSIUM SERPL-SCNC: 3 MMOL/L (ref 3.4–5.1)
POTASSIUM SERPL-SCNC: 3.4 MMOL/L (ref 3.4–5.1)
PR-INTERVAL (MSEC): 210
PROT SERPL-MCNC: 5.8 G/DL (ref 6.4–8.2)
QRS-INTERVAL (MSEC): 146
QT-INTERVAL (MSEC): 424
QTC: 504
R AXIS (DEGREES): 57
RAINBOW EXTRA TUBES HOLD SPECIMEN: NORMAL
RAINBOW EXTRA TUBES HOLD SPECIMEN: NORMAL
RBC # BLD: 3.95 MIL/MCL (ref 4–5.2)
REPORT TEXT: NORMAL
SODIUM SERPL-SCNC: 138 MMOL/L (ref 135–145)
T AXIS (DEGREES): 68
VENTRICULAR RATE EKG/MIN (BPM): 85
WBC # BLD: 9.3 K/MCL (ref 4.2–11)

## 2024-08-14 PROCEDURE — 36415 COLL VENOUS BLD VENIPUNCTURE: CPT | Performed by: INTERNAL MEDICINE

## 2024-08-14 PROCEDURE — 10002800 HB RX 250 W HCPCS: Performed by: INTERNAL MEDICINE

## 2024-08-14 PROCEDURE — 82962 GLUCOSE BLOOD TEST: CPT

## 2024-08-14 PROCEDURE — G0378 HOSPITAL OBSERVATION PER HR: HCPCS

## 2024-08-14 PROCEDURE — 96372 THER/PROPH/DIAG INJ SC/IM: CPT | Performed by: INTERNAL MEDICINE

## 2024-08-14 PROCEDURE — 10002807 HB RX 258: Performed by: INTERNAL MEDICINE

## 2024-08-14 PROCEDURE — 10004651 HB RX, NO CHARGE ITEM: Performed by: INTERNAL MEDICINE

## 2024-08-14 PROCEDURE — 84132 ASSAY OF SERUM POTASSIUM: CPT | Performed by: STUDENT IN AN ORGANIZED HEALTH CARE EDUCATION/TRAINING PROGRAM

## 2024-08-14 PROCEDURE — 80053 COMPREHEN METABOLIC PANEL: CPT | Performed by: INTERNAL MEDICINE

## 2024-08-14 PROCEDURE — 85025 COMPLETE CBC W/AUTO DIFF WBC: CPT | Performed by: INTERNAL MEDICINE

## 2024-08-14 PROCEDURE — 10002803 HB RX 637: Performed by: STUDENT IN AN ORGANIZED HEALTH CARE EDUCATION/TRAINING PROGRAM

## 2024-08-14 PROCEDURE — 10002803 HB RX 637: Performed by: INTERNAL MEDICINE

## 2024-08-14 PROCEDURE — 99497 ADVNCD CARE PLAN 30 MIN: CPT | Performed by: STUDENT IN AN ORGANIZED HEALTH CARE EDUCATION/TRAINING PROGRAM

## 2024-08-14 PROCEDURE — 10002801 HB RX 250 W/O HCPCS: Performed by: INTERNAL MEDICINE

## 2024-08-14 PROCEDURE — G0316 PROLONGED IP OR OBS CARE EM SERVICE BEYOND PRIM SERVICE EA ADD 15 MIN: HCPCS | Performed by: STUDENT IN AN ORGANIZED HEALTH CARE EDUCATION/TRAINING PROGRAM

## 2024-08-14 PROCEDURE — 99223 1ST HOSP IP/OBS HIGH 75: CPT | Performed by: STUDENT IN AN ORGANIZED HEALTH CARE EDUCATION/TRAINING PROGRAM

## 2024-08-14 RX ORDER — TOPIRAMATE 25 MG/1
50 TABLET, FILM COATED ORAL DAILY
COMMUNITY

## 2024-08-14 RX ORDER — LEVOTHYROXINE SODIUM 25 UG/1
25 TABLET ORAL DAILY
COMMUNITY

## 2024-08-14 RX ORDER — PANTOPRAZOLE SODIUM 40 MG/1
40 TABLET, DELAYED RELEASE ORAL
Status: DISCONTINUED | OUTPATIENT
Start: 2024-08-14 | End: 2024-08-15 | Stop reason: HOSPADM

## 2024-08-14 RX ORDER — QUETIAPINE FUMARATE 25 MG/1
50 TABLET, FILM COATED ORAL AT BEDTIME
Status: DISCONTINUED | OUTPATIENT
Start: 2024-08-14 | End: 2024-08-15 | Stop reason: HOSPADM

## 2024-08-14 RX ORDER — POTASSIUM CHLORIDE 1500 MG/1
40 TABLET, EXTENDED RELEASE ORAL ONCE
Status: COMPLETED | OUTPATIENT
Start: 2024-08-14 | End: 2024-08-14

## 2024-08-14 RX ORDER — INSULIN ASPART 100 [IU]/ML
1-2 INJECTION, SOLUTION INTRAVENOUS; SUBCUTANEOUS DAILY PRN
COMMUNITY

## 2024-08-14 RX ORDER — DILTIAZEM HYDROCHLORIDE 180 MG/1
180 CAPSULE, EXTENDED RELEASE ORAL DAILY
Status: DISCONTINUED | OUTPATIENT
Start: 2024-08-14 | End: 2024-08-15 | Stop reason: HOSPADM

## 2024-08-14 RX ORDER — DIAZEPAM 5 MG
5 TABLET ORAL DAILY PRN
Status: DISCONTINUED | OUTPATIENT
Start: 2024-08-14 | End: 2024-08-15 | Stop reason: HOSPADM

## 2024-08-14 RX ORDER — LOSARTAN POTASSIUM 25 MG/1
25 TABLET ORAL DAILY
Status: DISCONTINUED | OUTPATIENT
Start: 2024-08-14 | End: 2024-08-15 | Stop reason: HOSPADM

## 2024-08-14 RX ORDER — MONTELUKAST SODIUM 10 MG/1
10 TABLET ORAL NIGHTLY
Status: DISCONTINUED | OUTPATIENT
Start: 2024-08-14 | End: 2024-08-15 | Stop reason: HOSPADM

## 2024-08-14 RX ORDER — VENLAFAXINE HYDROCHLORIDE 75 MG/1
150 CAPSULE, EXTENDED RELEASE ORAL DAILY
Status: DISCONTINUED | OUTPATIENT
Start: 2024-08-14 | End: 2024-08-15 | Stop reason: HOSPADM

## 2024-08-14 RX ORDER — CYCLOBENZAPRINE HCL 10 MG
5 TABLET ORAL 3 TIMES DAILY PRN
Status: DISCONTINUED | OUTPATIENT
Start: 2024-08-14 | End: 2024-08-15 | Stop reason: HOSPADM

## 2024-08-14 RX ORDER — QUETIAPINE FUMARATE 25 MG/1
50 TABLET, FILM COATED ORAL ONCE
Status: COMPLETED | OUTPATIENT
Start: 2024-08-14 | End: 2024-08-14

## 2024-08-14 RX ORDER — TORSEMIDE 10 MG/1
10 TABLET ORAL DAILY
Status: DISCONTINUED | OUTPATIENT
Start: 2024-08-14 | End: 2024-08-15 | Stop reason: HOSPADM

## 2024-08-14 RX ORDER — ROSUVASTATIN CALCIUM 20 MG/1
40 TABLET, COATED ORAL DAILY
Status: DISCONTINUED | OUTPATIENT
Start: 2024-08-14 | End: 2024-08-15 | Stop reason: HOSPADM

## 2024-08-14 RX ORDER — LAMOTRIGINE 100 MG/1
200 TABLET ORAL DAILY
Status: DISCONTINUED | OUTPATIENT
Start: 2024-08-14 | End: 2024-08-15 | Stop reason: HOSPADM

## 2024-08-14 RX ORDER — INSULIN GLARGINE 100 [IU]/ML
5 INJECTION, SOLUTION SUBCUTANEOUS DAILY PRN
COMMUNITY

## 2024-08-14 RX ORDER — LORATADINE 10 MG/1
10 TABLET ORAL DAILY
Status: DISCONTINUED | OUTPATIENT
Start: 2024-08-14 | End: 2024-08-15 | Stop reason: HOSPADM

## 2024-08-14 RX ORDER — POTASSIUM CHLORIDE 1500 MG/1
40 TABLET, EXTENDED RELEASE ORAL ONCE
Status: COMPLETED | OUTPATIENT
Start: 2024-08-15 | End: 2024-08-14

## 2024-08-14 RX ADMIN — POTASSIUM CHLORIDE 40 MEQ: 1500 TABLET, EXTENDED RELEASE ORAL at 17:51

## 2024-08-14 RX ADMIN — HEPARIN SODIUM 5000 UNITS: 5000 INJECTION INTRAVENOUS; SUBCUTANEOUS at 06:26

## 2024-08-14 RX ADMIN — SODIUM CHLORIDE: 9 INJECTION, SOLUTION INTRAVENOUS at 09:04

## 2024-08-14 RX ADMIN — LAMOTRIGINE 200 MG: 100 TABLET ORAL at 10:08

## 2024-08-14 RX ADMIN — POTASSIUM CHLORIDE 40 MEQ: 1500 TABLET, EXTENDED RELEASE ORAL at 10:07

## 2024-08-14 RX ADMIN — SODIUM CHLORIDE, PRESERVATIVE FREE 2 ML: 5 INJECTION INTRAVENOUS at 20:54

## 2024-08-14 RX ADMIN — HEPARIN SODIUM 5000 UNITS: 5000 INJECTION INTRAVENOUS; SUBCUTANEOUS at 15:48

## 2024-08-14 RX ADMIN — ACETAMINOPHEN 650 MG: 325 TABLET ORAL at 00:47

## 2024-08-14 RX ADMIN — DILTIAZEM HYDROCHLORIDE 180 MG: 180 CAPSULE, EXTENDED RELEASE ORAL at 10:07

## 2024-08-14 RX ADMIN — QUETIAPINE FUMARATE 50 MG: 25 TABLET, FILM COATED ORAL at 02:10

## 2024-08-14 RX ADMIN — ACETAMINOPHEN 650 MG: 325 TABLET ORAL at 13:09

## 2024-08-14 RX ADMIN — VENLAFAXINE HYDROCHLORIDE 150 MG: 75 CAPSULE, EXTENDED RELEASE ORAL at 10:07

## 2024-08-14 RX ADMIN — POTASSIUM CHLORIDE 40 MEQ: 1500 TABLET, EXTENDED RELEASE ORAL at 23:34

## 2024-08-14 RX ADMIN — QUETIAPINE FUMARATE 50 MG: 25 TABLET, FILM COATED ORAL at 20:53

## 2024-08-14 RX ADMIN — LORATADINE 10 MG: 10 TABLET ORAL at 10:08

## 2024-08-14 RX ADMIN — ROSUVASTATIN CALCIUM 40 MG: 20 TABLET, FILM COATED ORAL at 10:08

## 2024-08-14 RX ADMIN — SODIUM CHLORIDE: 9 INJECTION, SOLUTION INTRAVENOUS at 17:53

## 2024-08-14 RX ADMIN — PANTOPRAZOLE SODIUM 40 MG: 40 TABLET, DELAYED RELEASE ORAL at 10:08

## 2024-08-14 RX ADMIN — SODIUM CHLORIDE, PRESERVATIVE FREE 2 ML: 5 INJECTION INTRAVENOUS at 09:08

## 2024-08-14 RX ADMIN — HEPARIN SODIUM 5000 UNITS: 5000 INJECTION INTRAVENOUS; SUBCUTANEOUS at 20:53

## 2024-08-14 RX ADMIN — MONTELUKAST SODIUM 10 MG: 10 TABLET, FILM COATED ORAL at 20:53

## 2024-08-14 ASSESSMENT — PAIN SCALES - GENERAL
PAINLEVEL_OUTOF10: 8
PAINLEVEL_OUTOF10: 2
PAINLEVEL_OUTOF10: 3
PAINLEVEL_OUTOF10: 0
PAINLEVEL_OUTOF10: 0

## 2024-08-15 VITALS
SYSTOLIC BLOOD PRESSURE: 122 MMHG | DIASTOLIC BLOOD PRESSURE: 69 MMHG | BODY MASS INDEX: 34.88 KG/M2 | HEART RATE: 67 BPM | RESPIRATION RATE: 16 BRPM | OXYGEN SATURATION: 96 % | WEIGHT: 196.87 LBS | HEIGHT: 63 IN | TEMPERATURE: 98.2 F

## 2024-08-15 LAB
ANION GAP SERPL CALC-SCNC: 10 MMOL/L (ref 7–19)
BASOPHILS # BLD: 0 K/MCL (ref 0–0.3)
BASOPHILS NFR BLD: 0 %
BUN SERPL-MCNC: 9 MG/DL (ref 6–20)
BUN/CREAT SERPL: 8 (ref 7–25)
CALCIUM SERPL-MCNC: 8.3 MG/DL (ref 8.4–10.2)
CHLORIDE SERPL-SCNC: 107 MMOL/L (ref 97–110)
CO2 SERPL-SCNC: 29 MMOL/L (ref 21–32)
CREAT SERPL-MCNC: 1.16 MG/DL (ref 0.51–0.95)
DEPRECATED RDW RBC: 46.8 FL (ref 39–50)
EGFRCR SERPLBLD CKD-EPI 2021: 52 ML/MIN/{1.73_M2}
EOSINOPHIL # BLD: 0 K/MCL (ref 0–0.5)
EOSINOPHIL NFR BLD: 0 %
ERYTHROCYTE [DISTWIDTH] IN BLOOD: 14.5 % (ref 11–15)
FASTING DURATION TIME PATIENT: ABNORMAL H
GLUCOSE BLDC GLUCOMTR-MCNC: 143 MG/DL (ref 70–99)
GLUCOSE SERPL-MCNC: 93 MG/DL (ref 70–99)
HCT VFR BLD CALC: 33.3 % (ref 36–46.5)
HGB BLD-MCNC: 11 G/DL (ref 12–15.5)
IMM GRANULOCYTES # BLD AUTO: 0.1 K/MCL (ref 0–0.2)
IMM GRANULOCYTES # BLD: 1 %
LYMPHOCYTES # BLD: 1.8 K/MCL (ref 1–4)
LYMPHOCYTES NFR BLD: 26 %
MAGNESIUM SERPL-MCNC: 1.8 MG/DL (ref 1.7–2.4)
MCH RBC QN AUTO: 28.9 PG (ref 26–34)
MCHC RBC AUTO-ENTMCNC: 33 G/DL (ref 32–36.5)
MCV RBC AUTO: 87.6 FL (ref 78–100)
MONOCYTES # BLD: 0.4 K/MCL (ref 0.3–0.9)
MONOCYTES NFR BLD: 6 %
NEUTROPHILS # BLD: 4.6 K/MCL (ref 1.8–7.7)
NEUTROPHILS NFR BLD: 67 %
NRBC BLD MANUAL-RTO: 0 /100 WBC
PHOSPHATE SERPL-MCNC: 2.6 MG/DL (ref 2.4–4.7)
PLATELET # BLD AUTO: 201 K/MCL (ref 140–450)
POTASSIUM SERPL-SCNC: 3.6 MMOL/L (ref 3.4–5.1)
RBC # BLD: 3.8 MIL/MCL (ref 4–5.2)
SODIUM SERPL-SCNC: 142 MMOL/L (ref 135–145)
WBC # BLD: 6.9 K/MCL (ref 4.2–11)

## 2024-08-15 PROCEDURE — G0378 HOSPITAL OBSERVATION PER HR: HCPCS

## 2024-08-15 PROCEDURE — 10002801 HB RX 250 W/O HCPCS: Performed by: INTERNAL MEDICINE

## 2024-08-15 PROCEDURE — 84100 ASSAY OF PHOSPHORUS: CPT | Performed by: STUDENT IN AN ORGANIZED HEALTH CARE EDUCATION/TRAINING PROGRAM

## 2024-08-15 PROCEDURE — 10002800 HB RX 250 W HCPCS: Performed by: INTERNAL MEDICINE

## 2024-08-15 PROCEDURE — 82962 GLUCOSE BLOOD TEST: CPT

## 2024-08-15 PROCEDURE — 85025 COMPLETE CBC W/AUTO DIFF WBC: CPT | Performed by: STUDENT IN AN ORGANIZED HEALTH CARE EDUCATION/TRAINING PROGRAM

## 2024-08-15 PROCEDURE — 83735 ASSAY OF MAGNESIUM: CPT | Performed by: STUDENT IN AN ORGANIZED HEALTH CARE EDUCATION/TRAINING PROGRAM

## 2024-08-15 PROCEDURE — 80048 BASIC METABOLIC PNL TOTAL CA: CPT | Performed by: STUDENT IN AN ORGANIZED HEALTH CARE EDUCATION/TRAINING PROGRAM

## 2024-08-15 PROCEDURE — 10002803 HB RX 637: Performed by: INTERNAL MEDICINE

## 2024-08-15 PROCEDURE — 96372 THER/PROPH/DIAG INJ SC/IM: CPT | Performed by: INTERNAL MEDICINE

## 2024-08-15 PROCEDURE — 99239 HOSP IP/OBS DSCHRG MGMT >30: CPT | Performed by: STUDENT IN AN ORGANIZED HEALTH CARE EDUCATION/TRAINING PROGRAM

## 2024-08-15 PROCEDURE — 36415 COLL VENOUS BLD VENIPUNCTURE: CPT | Performed by: STUDENT IN AN ORGANIZED HEALTH CARE EDUCATION/TRAINING PROGRAM

## 2024-08-15 RX ORDER — TORSEMIDE 10 MG/1
5 TABLET ORAL DAILY
Status: SHIPPED | COMMUNITY
Start: 2024-08-15

## 2024-08-15 RX ADMIN — DILTIAZEM HYDROCHLORIDE 180 MG: 180 CAPSULE, EXTENDED RELEASE ORAL at 08:39

## 2024-08-15 RX ADMIN — HEPARIN SODIUM 5000 UNITS: 5000 INJECTION INTRAVENOUS; SUBCUTANEOUS at 05:48

## 2024-08-15 RX ADMIN — PANTOPRAZOLE SODIUM 40 MG: 40 TABLET, DELAYED RELEASE ORAL at 05:48

## 2024-08-15 RX ADMIN — LORATADINE 10 MG: 10 TABLET ORAL at 08:39

## 2024-08-15 RX ADMIN — ROSUVASTATIN CALCIUM 40 MG: 20 TABLET, FILM COATED ORAL at 08:39

## 2024-08-15 RX ADMIN — LAMOTRIGINE 200 MG: 100 TABLET ORAL at 08:39

## 2024-08-15 RX ADMIN — VENLAFAXINE HYDROCHLORIDE 150 MG: 75 CAPSULE, EXTENDED RELEASE ORAL at 08:39

## 2024-08-15 ASSESSMENT — PAIN SCALES - GENERAL
PAINLEVEL_OUTOF10: 0
PAINLEVEL_OUTOF10: 0

## 2024-08-17 ENCOUNTER — TELEPHONE (OUTPATIENT)
Dept: CARE COORDINATION | Age: 64
End: 2024-08-17

## 2024-08-24 ENCOUNTER — TELEPHONE (OUTPATIENT)
Dept: CARE COORDINATION | Age: 64
End: 2024-08-24

## 2024-08-31 ENCOUNTER — TELEPHONE (OUTPATIENT)
Dept: CARE COORDINATION | Age: 64
End: 2024-08-31

## 2024-09-07 ENCOUNTER — TELEPHONE (OUTPATIENT)
Dept: CARE COORDINATION | Age: 64
End: 2024-09-07

## 2024-09-14 ENCOUNTER — TELEPHONE (OUTPATIENT)
Dept: CARE COORDINATION | Age: 64
End: 2024-09-14

## 2024-10-23 ENCOUNTER — LABORATORY ENCOUNTER (OUTPATIENT)
Dept: LAB | Age: 64
End: 2024-10-23
Attending: ORTHOPAEDIC SURGERY
Payer: MEDICARE

## 2024-10-23 DIAGNOSIS — Z01.818 PRE-OP TESTING: ICD-10-CM

## 2024-10-23 LAB
ANTIBODY SCREEN: NEGATIVE
RH BLOOD TYPE: POSITIVE

## 2024-10-23 PROCEDURE — 86901 BLOOD TYPING SEROLOGIC RH(D): CPT

## 2024-10-23 PROCEDURE — 86850 RBC ANTIBODY SCREEN: CPT

## 2024-10-23 PROCEDURE — 86900 BLOOD TYPING SEROLOGIC ABO: CPT

## 2024-10-23 PROCEDURE — 87081 CULTURE SCREEN ONLY: CPT

## 2024-10-23 PROCEDURE — 36415 COLL VENOUS BLD VENIPUNCTURE: CPT

## 2024-11-06 NOTE — H&P (VIEW-ONLY)
Patient ID: Thanh Espinoza     Chief Complaint:    Right knee pain     HPI:  Thanh Espinoza is a 63 year old female who presents today for Patient presents with:  Right Knee - Pain  Patient states pain started a couple months ago. Pain is constant and described as aching. Pain is located in the entire aspect of the knee.  Patient denies any numbness, tingles, or radicular symptoms. Patient denies any signs of infection including fever or chills.  Patient states that the symptoms are getting progressively worse.  The pain is affecting their quality of life, ability to sleep at night, ability to perform activities and daily living and to ambulate. Patient has taken Ibuprofen as an anti-inflammatory for.  has been taking Norco for pain medications.  Patient occasionally has to use a walker.  History right total knee replacement 12/9/22 Dr Lombardi and subsequent quad tendon repair surgery 2/9/23.  Bone scan 3/6/24. They were seen previously by Dr Lombardi for painful total knee and sent for inflammatory blood markers that were elevated and aspirated at last visit to rule out infection and here to review and discuss next steps.  No acute falls, injuries, or other traumatic events.  It has been progressive in nature.  She feels like their knee is \"loose\" when trying to walk and extremely unstable.  Symptoms are worsened by prolonged standing or walking and squatting activities. She has used bracing and tried PT for months.  Pain now limiting and would like to attempt surgical intervention.       Social History    Socioeconomic History      Marital status:       Spouse name: Not on file      Number of children: Not on file      Years of education: Not on file      Highest education level: Not on file    Occupational History      Not on file    Tobacco Use      Smoking status: Former        Packs/day: 0.00        Years: 0.8 packs/day for 45.0 years (33.8 ttl pk-yrs)        Types: Cigarettes        Start date: 1/1/1974         Quit date: 2018        Years since quittin.8        Passive exposure: Never      Smokeless tobacco: Never      Tobacco comments: max 1 pk/dy, 0.75 pk/dy     Vaping Use      Vaping status: Never Used    Substance and Sexual Activity      Alcohol use: No        Alcohol/week: 0.0 standard drinks of alcohol      Drug use: No      Sexual activity: Not on file    Other Topics      Concerns:        Not on file    Social History Narrative        , 4 children.  On disability medicare since .  PT 2x weekly for exercise.            Social Determinants of Health  Financial Resource Strain: Low Risk  (2024)      Received from [facility]      Financial Resource Strain          In the past year, have you or any family members you live with been unable to get any of the following when it was really needed? Check all that apply.: None  Food Insecurity: Low Risk  (2024)      Received from [facility]      Food Insecurity          Within the past 12 months, you worried that your food would run out before you got money to buy more.  : Never true          Within the past 12 months, the food you bought just didn't last and you didn't have money to get more. : Never true  Transportation Needs: At Risk (2024)      Received from [facility]      Transportation Needs          In the past 12 months, has lack of reliable transportation kept you from medical appointments, meetings, work or from getting things needed for daily living? : Yes  Physical Activity: Not on file  Stress: Not on file  Social Connections: Low Risk  (2024)      Received from [facility]      Social Connections          How often do you see or talk to people that you care about and feel close to? (For example: talking to friends on the phone, visiting friends or family, going to Syn[employer] or club meetings): 3 to 5 times a week  Intimate Partner Violence: Low Risk  (2024)      Received  from [facility]      Interpersonal Safety          How often does anyone, including family and friends, physically hurt you? : Never          How often does anyone, including family and friends, insult or talk down to you?  : Never          How often does anyone, including family and friends, threaten you with harm? : Never          How often does anyone, including family and friends, scream or curse at you? : Never  Housing Stability: Low Risk  (1/31/2022)      [facility] from [facility]      Housing Stability          Are you concerned about having a safe and reliable place to live?: No  Past Medical History:   Diagnosis Date   • LANDRY (acute kidney injury) ([facility]) 11/2014    hosp [facility]   • Anxiety disorder    • Arrhythmia     currently wearing a heart monitor , poss a flutter    • Asthma 7/12/2012   • Back pain    • Bipolar disorder ([facility])     sees psych, Dr Qureshi   • Congestive heart disease ([facility])    • Depression    • Emphysema of lung ([facility]) 6/7/2019   • Esophageal reflux    • Essential hypertension    • High blood pressure    • High cholesterol    • Hyperlipidemia    • Obesity    • Obstructive sleep apnea (adult) (pediatric) 12/29/2011   • MARCELA (obstructive sleep apnea) 8/6/21-PSG    AHI-14   • PCOS (polycystic ovarian syndrome)    • Post partum depression    • Seizure disorder ([facility])     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; used to have seizures before   • Sleep apnea     no cpap   • Type 2 diabetes mellitus with stage 3 chronic kidney disease, without long-term current use of insulin ([facility]) 7/18/2018    As per chart note on 6.17.19   • Type II or unspecified type diabetes mellitus without mention of complication, not stated as uncontrolled     off metformin due to LANDRY     Family History   Problem Relation Age of Onset   • Diabetes Mother    • Hypertension Mother    • Obesity Mother    • Cancer Sister         twin breast   • OCD Sister    • Breast  Cancer Sister 61        age at dx 61   • Alcohol and Other Disorders Associated Father    • Bipolar Disorder Father         possible   • Bipolar Disorder Brother         unsure   • Breast Cancer Sister 59        age at dx 59   • Obesity Daughter    • Bipolar Disorder Daughter         also adhd daughter   • ADHD Daughter    • Other (MS) Daughter    • Bipolar Disorder Son         2 sons and one daughter have bipolar and adhd is on seroquel, ritalin,    • ADHD Son        ROS:      All other pertinent positives and negatives as noted above in HPI.       Physical Exam:     Vital Signs:  LMP  (LMP Unknown)   Estimated body mass index is 34.22 kg/m² as calculated from the following:    Height as of 11/1/24: 5' 3\" (1.6 m).    Weight as of 11/1/24: 193 lb 3.2 oz (87.6 kg).    Musculoskeletal:    Exam of the right knee:  Painful gait with a subtle limp  No muscle atrophy, erythema, ecchymosis, or gross deformity noted  Small knee effusion  + global joint line tenderness   Active range of motion 2-115*  4/5 strength flexion and extension  stable to varus and stable to valgus stress but slight increased laxity compared to contralateral side  neutral alignment of the limb  Sulcus sign positive  Sensation grossly intact to light tough throughout the lower extremity  Skin is intact  Distal pulses are 2  No signs or symptoms of DVT    right Hip exam:  No atrophy, erythema, ecchymosis, or gross deformity noted  No tenderness to palpation  Slightly diminished active range of motion, mild lack of IR but nonpainful  5/5 strength in hip flexion, abduction, and adduction  Anterior, lateral, and posterior hip impingement tests negative  Stinchfield and straight leg raise negative  BRINA negative        Diagnostic Studies:     Imaging was personally and individually reviewed and discussed at length with the patient:    4V right knee(s) were reviewed in the office today, including AP, flexion PA, lateral, and sunrise views:  Total Knee  Arthroplasty in place.  Tibial Position - neutral, Femoral position - neutral. Polyethylene demonstrates no significant wear.  There is no evidence of fracture or dislocation.  Patellar height - normal and alignment is normal.  No significant osteolysis is obvious. No other periosteal reactions or medullary lesions are seen.      Inflammatory markers elevated from 3/2024  CRP 1.69  ESR 30    Bone scan reviewed: some subtle uptake on femoral side     Aspiration 3/15/24  Alpha defensin negative, CRP-SF negative (2.0), only  and 55% segs, negative microbial ID panel, no growth cultures    MRI with intact extensor mechanism     10/07/2024  Hgb: 13.8  Alb: 4.2   Cr: 0.98  GFR: 63.8  MSSA/MRSA: Negative (11/01/2024)  A1C: 6.5 (11/01/2024)      Assessment:      Painful  Total Knee Arthroplasty - midflexion instability with increased uptake on bone scan suggesting aseptic loosening   Hx of quad repair post op   Obesity BMI 42.7->35           Plan:      Based on x-rays, history, and office evaluation, Thanh Espinoza has a failed Total Knee Arthroplasty.   I had a long conversation with the patient going over the etiology and treatment options regarding a failed TKA.  Illustrations and models were used as educational tools.  At this time, the patient has requested a revision TKA due to the their instability and continued symptoms.      We discussed non-operative management at length including bracing, physical therapy for strengthening and modalities as indicated, and other conservative mearures.  They have attempted these for a prolonged amount of time and wished to proceed with a revision TKA.      We also discussed the increased risks of morbidity with revision arthroplasty including but not limited to infection rates, neurologic and vascular injuries, continued pain, etc.  They fully understand and wish to proceed with surgery.      The spectrum of treatment options were discussed with the patient in detail including  both the nonoperative and operative treatment modalities and their respective risks and benefits.  After thorough discussion, the patient has elected to undergo surgical treatment.  The details of the surgical procedure were explained including the location of probable incisions and a description of the likely implants to be used.  Models and diagrams were used as educational resources. The patient understands the likely convalescence after surgery, as well as the rehabilitation required.  We thoroughly discussed the risks, benefits, and alternatives to surgery.  The risks include but are not limited to the risk of infection, joint stiffness, blood clots (including DVT and/or pulmonary embolus along with the risk of death), neurologic and/or vascular injury, fracture, dislocation, nonunion, malunion, need for further surgery including hardware failure requiring revision, and continued pain.  It was explained that if tissue has been repaired or reconstructed, there is also a chance of failure which may require further management.  In addition, we have discussed the risks, including the risk of disease transmission, associated with any allograft tissue which may be utilized.  Following the completion of the discussion, the patient expressed understanding of this planned course of care, all their questions were answered and consent will be obtained preoperatively.     aspiration not concerning for infection at this time.    Discussed risk for extensor mechanism disruption given history or possible limited extension.    Revision right total knee     no smoking  yes diabetes - Last A1c value was 6.5% done 11/1/2024.  BMI - 35  no MARCELA  no hx of infections/MRSA/dental/joint  no hx of blood clots   no blood thinner  last injection >3 mo ago  Reported cefazolin allergy with rashes   No Cardiac history  No Pulmonary history    - proceed with revision right total knee arthroplasty           Diagnoses and all orders for this  visit:    Status post total right knee replacement

## 2024-11-11 ENCOUNTER — ANESTHESIA (OUTPATIENT)
Dept: SURGERY | Facility: HOSPITAL | Age: 64
End: 2024-11-11
Payer: MEDICARE

## 2024-11-11 ENCOUNTER — APPOINTMENT (OUTPATIENT)
Dept: GENERAL RADIOLOGY | Facility: HOSPITAL | Age: 64
End: 2024-11-11
Attending: ORTHOPAEDIC SURGERY
Payer: MEDICARE

## 2024-11-11 ENCOUNTER — HOSPITAL ENCOUNTER (INPATIENT)
Facility: HOSPITAL | Age: 64
LOS: 1 days | Discharge: HOME HEALTH | End: 2024-11-12
Attending: ORTHOPAEDIC SURGERY | Admitting: ORTHOPAEDIC SURGERY
Payer: MEDICARE

## 2024-11-11 ENCOUNTER — ANESTHESIA EVENT (OUTPATIENT)
Dept: SURGERY | Facility: HOSPITAL | Age: 64
End: 2024-11-11
Payer: MEDICARE

## 2024-11-11 DIAGNOSIS — Z96.651 STATUS POST REVISION OF TOTAL REPLACEMENT OF RIGHT KNEE: ICD-10-CM

## 2024-11-11 DIAGNOSIS — Z96.652 S/P TOTAL KNEE REPLACEMENT USING CEMENT, LEFT: ICD-10-CM

## 2024-11-11 DIAGNOSIS — Z01.818 PRE-OP TESTING: Primary | ICD-10-CM

## 2024-11-11 LAB
CHLORIDE SERPL-SCNC: 107 MMOL/L (ref 98–112)
CO2 SERPL-SCNC: 28 MMOL/L (ref 21–32)
GLUCOSE BLD-MCNC: 168 MG/DL (ref 70–99)
GLUCOSE BLD-MCNC: 171 MG/DL (ref 70–99)
GLUCOSE BLD-MCNC: 264 MG/DL (ref 70–99)
POTASSIUM SERPL-SCNC: 4.2 MMOL/L (ref 3.5–5.1)
SODIUM SERPL-SCNC: 138 MMOL/L (ref 136–145)

## 2024-11-11 PROCEDURE — 87116 MYCOBACTERIA CULTURE: CPT | Performed by: ORTHOPAEDIC SURGERY

## 2024-11-11 PROCEDURE — 87070 CULTURE OTHR SPECIMN AEROBIC: CPT | Performed by: ORTHOPAEDIC SURGERY

## 2024-11-11 PROCEDURE — 87205 SMEAR GRAM STAIN: CPT | Performed by: ORTHOPAEDIC SURGERY

## 2024-11-11 PROCEDURE — 0SPC0JZ REMOVAL OF SYNTHETIC SUBSTITUTE FROM RIGHT KNEE JOINT, OPEN APPROACH: ICD-10-PCS | Performed by: ORTHOPAEDIC SURGERY

## 2024-11-11 PROCEDURE — 87206 SMEAR FLUORESCENT/ACID STAI: CPT | Performed by: ORTHOPAEDIC SURGERY

## 2024-11-11 PROCEDURE — 82962 GLUCOSE BLOOD TEST: CPT

## 2024-11-11 PROCEDURE — 0SRC0J9 REPLACEMENT OF RIGHT KNEE JOINT WITH SYNTHETIC SUBSTITUTE, CEMENTED, OPEN APPROACH: ICD-10-PCS | Performed by: ORTHOPAEDIC SURGERY

## 2024-11-11 PROCEDURE — 87102 FUNGUS ISOLATION CULTURE: CPT | Performed by: ORTHOPAEDIC SURGERY

## 2024-11-11 PROCEDURE — 88331 PATH CONSLTJ SURG 1 BLK 1SPC: CPT | Performed by: ORTHOPAEDIC SURGERY

## 2024-11-11 PROCEDURE — 87176 TISSUE HOMOGENIZATION CULTR: CPT | Performed by: ORTHOPAEDIC SURGERY

## 2024-11-11 PROCEDURE — 80051 ELECTROLYTE PANEL: CPT | Performed by: ANESTHESIOLOGY

## 2024-11-11 PROCEDURE — 88300 SURGICAL PATH GROSS: CPT | Performed by: ORTHOPAEDIC SURGERY

## 2024-11-11 PROCEDURE — 73560 X-RAY EXAM OF KNEE 1 OR 2: CPT | Performed by: ORTHOPAEDIC SURGERY

## 2024-11-11 PROCEDURE — 3E0T3BZ INTRODUCTION OF ANESTHETIC AGENT INTO PERIPHERAL NERVES AND PLEXI, PERCUTANEOUS APPROACH: ICD-10-PCS | Performed by: ANESTHESIOLOGY

## 2024-11-11 PROCEDURE — 87075 CULTR BACTERIA EXCEPT BLOOD: CPT | Performed by: ORTHOPAEDIC SURGERY

## 2024-11-11 PROCEDURE — 88305 TISSUE EXAM BY PATHOLOGIST: CPT | Performed by: ORTHOPAEDIC SURGERY

## 2024-11-11 PROCEDURE — 76942 ECHO GUIDE FOR BIOPSY: CPT | Performed by: ANESTHESIOLOGY

## 2024-11-11 PROCEDURE — 88311 DECALCIFY TISSUE: CPT | Performed by: ORTHOPAEDIC SURGERY

## 2024-11-11 DEVICE — IMPLANTABLE DEVICE
Type: IMPLANTABLE DEVICE | Site: KNEE | Status: FUNCTIONAL
Brand: PERSONA® VIVACIT-E®

## 2024-11-11 DEVICE — IMPLANTABLE DEVICE
Type: IMPLANTABLE DEVICE | Site: KNEE | Status: FUNCTIONAL
Brand: PERSONA®

## 2024-11-11 DEVICE — IMPLANTABLE DEVICE
Type: IMPLANTABLE DEVICE | Site: KNEE | Status: FUNCTIONAL
Brand: PERSONA® TRABECULAR METAL®

## 2024-11-11 DEVICE — IMPLANTABLE DEVICE
Type: IMPLANTABLE DEVICE | Site: KNEE
Brand: PERSONA®

## 2024-11-11 DEVICE — IMPLANTABLE DEVICE
Type: IMPLANTABLE DEVICE | Site: KNEE | Status: FUNCTIONAL
Brand: REFOBACIN® BONE CEMENT R

## 2024-11-11 RX ORDER — LAMOTRIGINE 200 MG/1
200 TABLET ORAL EVERY MORNING
Status: DISCONTINUED | OUTPATIENT
Start: 2024-11-12 | End: 2024-11-12

## 2024-11-11 RX ORDER — ASPIRIN 81 MG/1
81 TABLET ORAL 2 TIMES DAILY
Status: DISCONTINUED | OUTPATIENT
Start: 2024-11-11 | End: 2024-11-12

## 2024-11-11 RX ORDER — DEXAMETHASONE SODIUM PHOSPHATE 10 MG/ML
8 INJECTION, SOLUTION INTRAMUSCULAR; INTRAVENOUS ONCE
Status: COMPLETED | OUTPATIENT
Start: 2024-11-12 | End: 2024-11-12

## 2024-11-11 RX ORDER — NICOTINE POLACRILEX 4 MG
30 LOZENGE BUCCAL
Status: DISCONTINUED | OUTPATIENT
Start: 2024-11-11 | End: 2024-11-12

## 2024-11-11 RX ORDER — CEFAZOLIN SODIUM 1 G/3ML
INJECTION, POWDER, FOR SOLUTION INTRAMUSCULAR; INTRAVENOUS AS NEEDED
Status: DISCONTINUED | OUTPATIENT
Start: 2024-11-11 | End: 2024-11-11 | Stop reason: SURG

## 2024-11-11 RX ORDER — OXYCODONE HYDROCHLORIDE 10 MG/1
10 TABLET ORAL EVERY 4 HOURS PRN
Status: DISCONTINUED | OUTPATIENT
Start: 2024-11-11 | End: 2024-11-12

## 2024-11-11 RX ORDER — METOCLOPRAMIDE HYDROCHLORIDE 5 MG/ML
10 INJECTION INTRAMUSCULAR; INTRAVENOUS EVERY 8 HOURS PRN
Status: DISCONTINUED | OUTPATIENT
Start: 2024-11-11 | End: 2024-11-12

## 2024-11-11 RX ORDER — DEXTROSE MONOHYDRATE 25 G/50ML
50 INJECTION, SOLUTION INTRAVENOUS
Status: DISCONTINUED | OUTPATIENT
Start: 2024-11-11 | End: 2024-11-12

## 2024-11-11 RX ORDER — BUPIVACAINE HYDROCHLORIDE 2.5 MG/ML
INJECTION, SOLUTION EPIDURAL; INFILTRATION; INTRACAUDAL AS NEEDED
Status: DISCONTINUED | OUTPATIENT
Start: 2024-11-11 | End: 2024-11-11

## 2024-11-11 RX ORDER — SODIUM CHLORIDE, SODIUM LACTATE, POTASSIUM CHLORIDE, CALCIUM CHLORIDE 600; 310; 30; 20 MG/100ML; MG/100ML; MG/100ML; MG/100ML
INJECTION, SOLUTION INTRAVENOUS CONTINUOUS
Status: DISCONTINUED | OUTPATIENT
Start: 2024-11-11 | End: 2024-11-11 | Stop reason: HOSPADM

## 2024-11-11 RX ORDER — LEVOTHYROXINE SODIUM 25 UG/1
25 TABLET ORAL
COMMUNITY
Start: 2024-11-04

## 2024-11-11 RX ORDER — SPIRONOLACTONE 25 MG/1
25 TABLET ORAL DAILY
Status: DISCONTINUED | OUTPATIENT
Start: 2024-11-12 | End: 2024-11-12

## 2024-11-11 RX ORDER — MONTELUKAST SODIUM 10 MG/1
10 TABLET ORAL NIGHTLY
Status: DISCONTINUED | OUTPATIENT
Start: 2024-11-11 | End: 2024-11-12

## 2024-11-11 RX ORDER — IBUPROFEN/PSEUDOEPHEDRINE HCL 200MG-30MG
TABLET ORAL NIGHTLY PRN
Status: DISCONTINUED | OUTPATIENT
Start: 2024-11-11 | End: 2024-11-11 | Stop reason: SDUPTHER

## 2024-11-11 RX ORDER — BUPIVACAINE HYDROCHLORIDE 7.5 MG/ML
INJECTION, SOLUTION INTRASPINAL AS NEEDED
Status: DISCONTINUED | OUTPATIENT
Start: 2024-11-11 | End: 2024-11-11 | Stop reason: SURG

## 2024-11-11 RX ORDER — BISACODYL 10 MG
10 SUPPOSITORY, RECTAL RECTAL
Status: DISCONTINUED | OUTPATIENT
Start: 2024-11-11 | End: 2024-11-12

## 2024-11-11 RX ORDER — SENNOSIDES 8.6 MG
17.2 TABLET ORAL NIGHTLY
Status: DISCONTINUED | OUTPATIENT
Start: 2024-11-11 | End: 2024-11-12

## 2024-11-11 RX ORDER — HYDROMORPHONE HYDROCHLORIDE 1 MG/ML
0.6 INJECTION, SOLUTION INTRAMUSCULAR; INTRAVENOUS; SUBCUTANEOUS EVERY 5 MIN PRN
Status: DISCONTINUED | OUTPATIENT
Start: 2024-11-11 | End: 2024-11-11 | Stop reason: HOSPADM

## 2024-11-11 RX ORDER — HYDROMORPHONE HYDROCHLORIDE 1 MG/ML
INJECTION, SOLUTION INTRAMUSCULAR; INTRAVENOUS; SUBCUTANEOUS
Status: COMPLETED
Start: 2024-11-11 | End: 2024-11-11

## 2024-11-11 RX ORDER — ONDANSETRON 2 MG/ML
4 INJECTION INTRAMUSCULAR; INTRAVENOUS EVERY 6 HOURS PRN
Status: DISCONTINUED | OUTPATIENT
Start: 2024-11-11 | End: 2024-11-12

## 2024-11-11 RX ORDER — FERROUS SULFATE 325(65) MG
325 TABLET, DELAYED RELEASE (ENTERIC COATED) ORAL
Status: DISCONTINUED | OUTPATIENT
Start: 2024-11-12 | End: 2024-11-12

## 2024-11-11 RX ORDER — OXYCODONE HYDROCHLORIDE 5 MG/1
5 TABLET ORAL EVERY 4 HOURS PRN
Status: DISCONTINUED | OUTPATIENT
Start: 2024-11-11 | End: 2024-11-12

## 2024-11-11 RX ORDER — DIPHENHYDRAMINE HCL 25 MG
25 CAPSULE ORAL EVERY 4 HOURS PRN
Status: DISCONTINUED | OUTPATIENT
Start: 2024-11-11 | End: 2024-11-12

## 2024-11-11 RX ORDER — HYDROMORPHONE HYDROCHLORIDE 1 MG/ML
0.4 INJECTION, SOLUTION INTRAMUSCULAR; INTRAVENOUS; SUBCUTANEOUS EVERY 5 MIN PRN
Status: DISCONTINUED | OUTPATIENT
Start: 2024-11-11 | End: 2024-11-11 | Stop reason: HOSPADM

## 2024-11-11 RX ORDER — FAMOTIDINE 20 MG/1
20 TABLET, FILM COATED ORAL 2 TIMES DAILY
Status: DISCONTINUED | OUTPATIENT
Start: 2024-11-11 | End: 2024-11-12

## 2024-11-11 RX ORDER — DEXTROSE MONOHYDRATE 25 G/50ML
50 INJECTION, SOLUTION INTRAVENOUS
Status: DISCONTINUED | OUTPATIENT
Start: 2024-11-11 | End: 2024-11-11 | Stop reason: HOSPADM

## 2024-11-11 RX ORDER — ONDANSETRON 2 MG/ML
INJECTION INTRAMUSCULAR; INTRAVENOUS AS NEEDED
Status: DISCONTINUED | OUTPATIENT
Start: 2024-11-11 | End: 2024-11-11 | Stop reason: SURG

## 2024-11-11 RX ORDER — SODIUM PHOSPHATE, DIBASIC AND SODIUM PHOSPHATE, MONOBASIC 7; 19 G/230ML; G/230ML
1 ENEMA RECTAL ONCE AS NEEDED
Status: DISCONTINUED | OUTPATIENT
Start: 2024-11-11 | End: 2024-11-12

## 2024-11-11 RX ORDER — METOPROLOL SUCCINATE 50 MG/1
50 TABLET, EXTENDED RELEASE ORAL DAILY
Status: DISCONTINUED | OUTPATIENT
Start: 2024-11-11 | End: 2024-11-12

## 2024-11-11 RX ORDER — HYDROMORPHONE HYDROCHLORIDE 1 MG/ML
0.2 INJECTION, SOLUTION INTRAMUSCULAR; INTRAVENOUS; SUBCUTANEOUS EVERY 5 MIN PRN
Status: DISCONTINUED | OUTPATIENT
Start: 2024-11-11 | End: 2024-11-11 | Stop reason: HOSPADM

## 2024-11-11 RX ORDER — DIPHENHYDRAMINE HYDROCHLORIDE 50 MG/ML
25 INJECTION INTRAMUSCULAR; INTRAVENOUS ONCE AS NEEDED
Status: ACTIVE | OUTPATIENT
Start: 2024-11-11 | End: 2024-11-11

## 2024-11-11 RX ORDER — DIPHENHYDRAMINE HYDROCHLORIDE 50 MG/ML
12.5 INJECTION INTRAMUSCULAR; INTRAVENOUS EVERY 4 HOURS PRN
Status: DISCONTINUED | OUTPATIENT
Start: 2024-11-11 | End: 2024-11-12

## 2024-11-11 RX ORDER — QUETIAPINE FUMARATE 100 MG/1
100 TABLET, FILM COATED ORAL NIGHTLY PRN
Status: DISCONTINUED | OUTPATIENT
Start: 2024-11-11 | End: 2024-11-12

## 2024-11-11 RX ORDER — NALOXONE HYDROCHLORIDE 0.4 MG/ML
0.08 INJECTION, SOLUTION INTRAMUSCULAR; INTRAVENOUS; SUBCUTANEOUS AS NEEDED
Status: DISCONTINUED | OUTPATIENT
Start: 2024-11-11 | End: 2024-11-11 | Stop reason: HOSPADM

## 2024-11-11 RX ORDER — LOSARTAN POTASSIUM 25 MG/1
25 TABLET ORAL DAILY
Status: DISCONTINUED | OUTPATIENT
Start: 2024-11-12 | End: 2024-11-12

## 2024-11-11 RX ORDER — POLYETHYLENE GLYCOL 3350 17 G/17G
17 POWDER, FOR SOLUTION ORAL DAILY PRN
Status: DISCONTINUED | OUTPATIENT
Start: 2024-11-11 | End: 2024-11-12

## 2024-11-11 RX ORDER — DOCUSATE SODIUM 100 MG/1
100 CAPSULE, LIQUID FILLED ORAL 2 TIMES DAILY
Status: DISCONTINUED | OUTPATIENT
Start: 2024-11-11 | End: 2024-11-12

## 2024-11-11 RX ORDER — LORATADINE 10 MG/1
TABLET ORAL
COMMUNITY
Start: 2024-09-04

## 2024-11-11 RX ORDER — NICOTINE POLACRILEX 4 MG
15 LOZENGE BUCCAL
Status: DISCONTINUED | OUTPATIENT
Start: 2024-11-11 | End: 2024-11-11 | Stop reason: HOSPADM

## 2024-11-11 RX ORDER — DILTIAZEM HYDROCHLORIDE 180 MG/1
180 CAPSULE, EXTENDED RELEASE ORAL DAILY
Status: DISCONTINUED | OUTPATIENT
Start: 2024-11-12 | End: 2024-11-12

## 2024-11-11 RX ORDER — FAMOTIDINE 10 MG/ML
20 INJECTION, SOLUTION INTRAVENOUS 2 TIMES DAILY
Status: DISCONTINUED | OUTPATIENT
Start: 2024-11-11 | End: 2024-11-12

## 2024-11-11 RX ORDER — LEVOTHYROXINE SODIUM 25 UG/1
25 TABLET ORAL
Status: DISCONTINUED | OUTPATIENT
Start: 2024-11-12 | End: 2024-11-12

## 2024-11-11 RX ORDER — VENLAFAXINE HYDROCHLORIDE 75 MG/1
150 CAPSULE, EXTENDED RELEASE ORAL
Status: DISCONTINUED | OUTPATIENT
Start: 2024-11-12 | End: 2024-11-12

## 2024-11-11 RX ORDER — NICOTINE POLACRILEX 4 MG
15 LOZENGE BUCCAL
Status: DISCONTINUED | OUTPATIENT
Start: 2024-11-11 | End: 2024-11-12

## 2024-11-11 RX ORDER — ROSUVASTATIN CALCIUM 20 MG/1
40 TABLET, COATED ORAL NIGHTLY
Status: DISCONTINUED | OUTPATIENT
Start: 2024-11-11 | End: 2024-11-12

## 2024-11-11 RX ORDER — CETIRIZINE HYDROCHLORIDE 10 MG/1
10 TABLET ORAL DAILY
Status: DISCONTINUED | OUTPATIENT
Start: 2024-11-12 | End: 2024-11-12

## 2024-11-11 RX ORDER — LAMOTRIGINE 100 MG/1
100 TABLET ORAL EVERY MORNING
Status: DISCONTINUED | OUTPATIENT
Start: 2024-11-12 | End: 2024-11-12

## 2024-11-11 RX ORDER — DEXAMETHASONE SODIUM PHOSPHATE 10 MG/ML
INJECTION, SOLUTION INTRAMUSCULAR; INTRAVENOUS AS NEEDED
Status: DISCONTINUED | OUTPATIENT
Start: 2024-11-11 | End: 2024-11-11 | Stop reason: SURG

## 2024-11-11 RX ORDER — TRANEXAMIC ACID 10 MG/ML
1000 INJECTION, SOLUTION INTRAVENOUS ONCE
Status: COMPLETED | OUTPATIENT
Start: 2024-11-11 | End: 2024-11-11

## 2024-11-11 RX ORDER — ACETAMINOPHEN 500 MG
1000 TABLET ORAL ONCE
Status: DISCONTINUED | OUTPATIENT
Start: 2024-11-11 | End: 2024-11-11 | Stop reason: HOSPADM

## 2024-11-11 RX ORDER — HYDROMORPHONE HYDROCHLORIDE 1 MG/ML
0.4 INJECTION, SOLUTION INTRAMUSCULAR; INTRAVENOUS; SUBCUTANEOUS EVERY 2 HOUR PRN
Status: DISCONTINUED | OUTPATIENT
Start: 2024-11-11 | End: 2024-11-12

## 2024-11-11 RX ORDER — NICOTINE POLACRILEX 4 MG
30 LOZENGE BUCCAL
Status: DISCONTINUED | OUTPATIENT
Start: 2024-11-11 | End: 2024-11-11 | Stop reason: HOSPADM

## 2024-11-11 RX ORDER — ACETAMINOPHEN 325 MG/1
650 TABLET ORAL
Status: DISCONTINUED | OUTPATIENT
Start: 2024-11-11 | End: 2024-11-12

## 2024-11-11 RX ORDER — LIDOCAINE HYDROCHLORIDE 10 MG/ML
INJECTION, SOLUTION EPIDURAL; INFILTRATION; INTRACAUDAL; PERINEURAL AS NEEDED
Status: DISCONTINUED | OUTPATIENT
Start: 2024-11-11 | End: 2024-11-11 | Stop reason: SURG

## 2024-11-11 RX ORDER — SODIUM CHLORIDE, SODIUM LACTATE, POTASSIUM CHLORIDE, CALCIUM CHLORIDE 600; 310; 30; 20 MG/100ML; MG/100ML; MG/100ML; MG/100ML
INJECTION, SOLUTION INTRAVENOUS CONTINUOUS
Status: DISCONTINUED | OUTPATIENT
Start: 2024-11-11 | End: 2024-11-12

## 2024-11-11 RX ORDER — HYDROMORPHONE HYDROCHLORIDE 1 MG/ML
0.8 INJECTION, SOLUTION INTRAMUSCULAR; INTRAVENOUS; SUBCUTANEOUS EVERY 2 HOUR PRN
Status: DISCONTINUED | OUTPATIENT
Start: 2024-11-11 | End: 2024-11-12

## 2024-11-11 RX ORDER — BUPIVACAINE HYDROCHLORIDE 2.5 MG/ML
INJECTION, SOLUTION EPIDURAL; INFILTRATION; INTRACAUDAL AS NEEDED
Status: DISCONTINUED | OUTPATIENT
Start: 2024-11-11 | End: 2024-11-11 | Stop reason: SURG

## 2024-11-11 RX ORDER — PANTOPRAZOLE SODIUM 20 MG/1
20 TABLET, DELAYED RELEASE ORAL
Status: DISCONTINUED | OUTPATIENT
Start: 2024-11-12 | End: 2024-11-12

## 2024-11-11 RX ADMIN — SODIUM CHLORIDE, SODIUM LACTATE, POTASSIUM CHLORIDE, CALCIUM CHLORIDE: 600; 310; 30; 20 INJECTION, SOLUTION INTRAVENOUS at 17:31:00

## 2024-11-11 RX ADMIN — SODIUM CHLORIDE, SODIUM LACTATE, POTASSIUM CHLORIDE, CALCIUM CHLORIDE: 600; 310; 30; 20 INJECTION, SOLUTION INTRAVENOUS at 18:20:00

## 2024-11-11 RX ADMIN — CEFAZOLIN SODIUM 2 G: 1 INJECTION, POWDER, FOR SOLUTION INTRAMUSCULAR; INTRAVENOUS at 15:36:00

## 2024-11-11 RX ADMIN — BUPIVACAINE HYDROCHLORIDE 20 ML: 2.5 INJECTION, SOLUTION EPIDURAL; INFILTRATION; INTRACAUDAL at 15:33:00

## 2024-11-11 RX ADMIN — ONDANSETRON 4 MG: 2 INJECTION INTRAMUSCULAR; INTRAVENOUS at 18:08:00

## 2024-11-11 RX ADMIN — DEXAMETHASONE SODIUM PHOSPHATE 2 MG: 10 INJECTION, SOLUTION INTRAMUSCULAR; INTRAVENOUS at 15:33:00

## 2024-11-11 RX ADMIN — LIDOCAINE HYDROCHLORIDE 50 MG: 10 INJECTION, SOLUTION EPIDURAL; INFILTRATION; INTRACAUDAL; PERINEURAL at 15:35:00

## 2024-11-11 RX ADMIN — TRANEXAMIC ACID 1000 MG: 10 INJECTION, SOLUTION INTRAVENOUS at 15:38:00

## 2024-11-11 RX ADMIN — BUPIVACAINE HYDROCHLORIDE 1.6 ML: 7.5 INJECTION, SOLUTION INTRASPINAL at 15:30:00

## 2024-11-11 NOTE — DISCHARGE INSTRUCTIONS
Sometimes managing your health at home requires assistance.  The [facility] team has recognized your preference to use [facility].  They can be reached by phone at [phone].  The fax number for your reference is [phone].  A representative from the home health agency will contact you or your family to schedule your first visit.     Knee Replacement Discharge Instructions    Dear Patient,     [facility] cares about your progress with recovery following your joint replacement surgery.     300 days from your scheduled surgery, [facility] will send you a follow-up survey to help us understand how your surgery impacted your mobility, pain, and overall quality of life. Please make every effort to complete this survey. The information collected from this survey will be used by your physician to track your recovery.     Sincerely,     [facility]      Activity    Bathing  No tub baths, pools, or saunas until cleared by surgeon (about 4-6 weeks because it takes that long for the incision on the skin to heal and be a barrier to prevent infection).  When allowed to shower:    IF AQUACEL - dressing is waterproof and does not require being covered to keep it dry.  Pat dressing and surrounding skin dry after shower              AQUACEL          Gauze dressings are NOT waterproof and REQUIRE being covered with a waterproof barrier to keep the dressing and the incision dry.  SARAN WRAP, GLAD WRAP, and PRESS N SEAL WORK  REALLY WELL BUT ANY PLASTIC WRAP WILL DO.   Do not wash incision.   Remove entire wrapping and old dressing (if Gauze) after showering. Pat dry if necessary WITH A CLEAN TOWEL and cover incision with dry sterile gauze and paper tape. For other types of dressings, follow surgeon’s orders.                                 GAUZE          Driving  Do not drive until cleared by surgeon. This is usually in four to six weeks after surgery.  Discuss at follow-up office visit.   Not allowed while taking narcotic pain medication or muscle relaxants.    Sex  Usually allowed after four to six weeks - check with surgeon at your office visit.    Return to work  Usually allowed after four to six weeks. Discuss specific work activities with your surgeon.    Restrictions  For knee replacement surgery, follow instructions provided by physical therapy.  Do NOT put a pillow under your knee as it may be more difficult to straighten afterwards.    No smoking  Avoid smoking. It is known to cause breathing problems and can decrease the rate of healing.    Incision care/Dressing changes  Wash hands before and after dressing changes.  FOR DRY GAUZE DRESSING:  Change dressing daily using dry sterile gauze and paper tape once Aquacel (waterproof) dressing changed (which is about 7 days after surgery). Continue this until you follow up in the office with your surgeon. Have knee bent when changing dressing for more ease of bending afterwards.  There could be a small amount of redness around the staples or incision; this is normal.  Watch for increased redness, warmth, any odor, increased drainage or opening of the incision. A little clear yellow or blood tinged drainage is normal up to 2 weeks after surgery but it should be less every day until it stops.  Call physician if you notice any concerning changes.  Sutures/staples will be removed at first office visit (10 days- 3 weeks).                          GAUZE                                                   Medication  Anticoagulants = blood thinners (Xarelto, Eliquis, Lovenox, Coumadin, or Aspirin)  Pill or shot form depending on what your physician orders.   IF placed on Coumadin, you may also need lab work done for monitoring.  You will bleed easier and bruise easier while on these medications.   Usually you will be on a blood thinner for about 2-5 weeks depending what physician orders.  Contact your physician if you  have signs of bruising, nose bleeds or blood in your urine. You may consider using electric razors and soft bristle toothbrushes.  Do not take aspirin while taking blood thinners unless ordered by your physician.  Review anticoagulant education information sheet provided.    Discomfort  Surgical discomfort is normal for one to two months.  Have realistic goals and keep a positive outlook.  You may need pain medication regularly (every 4-6 hours) the first 2 weeks and then begin to decrease how often you are taking it.  Take pain medication as prescribed with food, especially before therapy, allowing 30-60 minutes to take effect.  Do not drink alcohol while on pain medication.  Keep pain manageable; pain should not disrupt your sleep or activities like getting out of bed or walking.  As you have less discomfort, decrease the amount of pain medication you take. Use plain Tylenol (acetaminophen) for less severe pain.  Some pain medications have Tylenol (acetaminophen) in them such as Norco and Percocet. Do NOT take Tylenol (acetaminophen) within 4 hours of a dose of these medications.  Apply ice or cold therapy to surgical site for 20 minutes at least four times a day, especially after therapy. Be sure there is a thin cloth barrier between skin and ice or cold therapy.  Change position at least every 45 minutes while awake to avoid stiffness or increased discomfort.  For knee replacements- may elevate your leg by placing a pillow under entire leg. Do not place pillow only under the knee.  Have realistic goals and keep a positive outlook.  Deep breathing and relaxation techniques and distractions can help!  If you focus on something else, you do not experience the pain the same. Take advantage of everything available to your to help control you discomfort.  Contact physician if discomfort does not respond to pain medication.    Body changes  Constipation is common with the use of narcotics.  Eat fiber rich foods and  drink plenty of fluids.  Use stool softeners such as Colace or Senakot while on narcotics, and laxatives such as Miralax or Milk of Magnesia if needed.   An enema or suppository may be needed if above measures do not work.    Prevention of infection and promotion of healing  Good hand washing is important. Everyone should wash their hands or use hand  as soon as they walk in your house-whether they live there or are visiting.  Keep bed linen/clothing freshly laundered.  Do not allow others or pets to touch your incision.  Avoid people that have colds or the flu.  Your surgeon may recommend that you take antibiotics before you undergo any dental or other invasive surgical procedures after your joint replacement. Speak with your physician about this at your post-op office visit.  Eat a balanced diet high in fiber and drink plenty of fluids.   Continue using incentive spirometry because narcotics make you sleepy so you may not take good deep breaths. We do not want you to get pneumonia.     Post op Office visits  Schedule 10 days to 3 weeks after surgery WITH SURGEON’s office.  Additional visits may need to be scheduled. Your physician will discuss this at first post-op office visit.  Schedule outpatient physical therapy per your surgeon’s orders.  Schedule one week follow up after surgery WITH PRIMARY CARE PHYSICIAN; review your medications over last 6 months. Your body gets stressed by surgery and that stress can affect all your other health issues (such as high blood pressure, diabetes, CHF, afib, and asthma just to name a few).  We don’t want those other health issues to cause you to get readmitted to the hospital; much better for you to catch developing problems and prevent them from becoming larger ones.  RACHEAL HOSE - IF ordered by your surgeon, wear these during the day and off at night.      Notify your surgeon if you notice any  of the following signs  Separation of incision line.  Increased redness,  swelling, or warmth of skin around incision.  Increased or foul smelling drainage from incision  Red streaks on skin near incision.  Temperature >101 F.  Increased pain at incision not relieved by pain medication.      Signs of blood clot  Pain, excessive tenderness, redness, or swelling in leg or calf (other than incision site).  (CALL SURGEON)      Go directly to the ER or CALL 911 if  you:  become short of breath  have chest pain  cough up blood  have unexplained anxiety with breathing  Traveling and Handicapped parking  Check with you surgeon when allowed so you don’t set yourself up for greater chance of complications.  If traveling by car, get out to stretch every 2 hours.  This helps prevent stiffness.  You may need to do this any time you travel for the first year after surgery.  If traveling by plane, BEFORE you get into a security line, let them know that you had your hip replaced, as you will most likely set off the metal detector. The doctors no longer provide an identification card for this as they are easily copied. ALSO request a wheelchair the first year to board and get off a plane…this aids in priority seating and you should sit on the aisle or at the bulkhead where you can easily stretch your legs and get up to walk up and down the aisles…this helps prevent blood clots and stiffness.  TEMPORARY HANDICAP PARKING APPLICATION  (good for 3-6 months)  - At Surgeon or PCP visit, request they fill out the form, then go to DM (only time you do not wait in a long line there). Some [facility] offices provide the same service. ([place] service; if you live in another [facility], you may check with them as well). You need space to open car doors to position yourself properly with walker to get in and out of your car safely; some parking spaces are  practically on top of each other and do not give you enough room.    SPECIAL  INSTRUCTIONS:    Call PCP and get lab order for  Basic Metabolic Panel to recheck your Sodium and Creatine level within 2 days of discharge.

## 2024-11-11 NOTE — ANESTHESIA PROCEDURE NOTES
Spinal Block    Date/Time: 11/11/2024 3:25 PM    Performed by: Sondra Guerra MD  Authorized by: Sondra Guerra MD      General Information and Staff    Start Time:  11/11/2024 3:25 PM  End Time:  11/11/2024 3:30 PM  Anesthesiologist:  Sondra Guerra MD  Performed by:  Anesthesiologist  Patient Location:  OR  Site identification: surface landmarks    Preanesthetic Checklist: patient identified, IV checked, risks and benefits discussed, monitors and equipment checked, pre-op evaluation, timeout performed, anesthesia consent and sterile technique used      Procedure Details    Patient Position:  Sitting  Prep: ChloraPrep    Monitoring:  Cardiac monitor, heart rate and continuous pulse ox  Approach:  Midline  Location:  L3-4  Injection Technique:  Single-shot    Needle    Needle Type:  Sprotte  Needle Gauge:  24 G  Needle Length:  3.5 in    Assessment    Sensory Level:   Events: clear CSF, CSF aspirated, well tolerated and blood negative      Additional Comments

## 2024-11-11 NOTE — ANESTHESIA PROCEDURE NOTES
Regional Block    Date/Time: 11/11/2024 3:33 PM    Performed by: Sondra Guerra MD  Authorized by: Sondra Guerra MD      General Information and Staff    Start Time:  11/11/2024 3:33 PM  End Time:  11/11/2024 3:35 PM  Anesthesiologist:  Sondra Guerra MD  Performed by:  Anesthesiologist  Patient Location:  OR    Block Placement: Pre Induction  Site Identification: real time ultrasound guided and image stored and retrievable    Block site/laterality marked before start: site marked  Reason for Block: at surgeon's request and post-op pain management    Preanesthetic Checklist: 2 patient identifers, IV checked, risks and benefits discussed, monitors and equipment checked, pre-op evaluation, timeout performed, anesthesia consent, sterile technique used, no prohibitive neurological deficits and no local skin infection at insertion site      Procedure Details    Patient Position:   Prep: ChloraPrep    Monitoring:  Cardiac monitor, continuous pulse ox and blood pressure cuff  Block Type:  Adductor canal  Laterality:  Right  Injection Technique:  Single-shot    Needle    Needle Type:  Short-bevel and echogenic  Needle Localization:  Ultrasound guidance  Reason for Ultrasound Use: appropriate spread of the medication was noted in real time and no ultrasound evidence of intravascular and/or intraneural injection            Assessment    Injection Assessment:  Good spread noted, negative resistance, negative aspiration for heme, incremental injection and low pressure  Heart Rate Change: No    - Patient tolerated block procedure well without evidence of immediate block related complications.     Medications  11/11/2024 3:33 PM      Additional Comments    Medication:  Bupivacaine 0.25% 20mL with 1:200,000 epi and 2 mg of PF dexamethasone.

## 2024-11-11 NOTE — INTERVAL H&P NOTE
Pre-op Diagnosis: OBESITY , MORBID, STATUS POST TENDON REPAIR    The above referenced H&P was reviewed by Jass Burgess MD on 11/11/2024, the patient was examined and no significant changes have occurred in the patient's condition since the H&P was performed.  I discussed with the patient and/or legal representative the potential benefits, risks and side effects of this procedure; the likelihood of the patient achieving goals; and potential problems that might occur during recuperation.  I discussed reasonable alternatives to the procedure, including risks, benefits and side effects related to the alternatives and risks related to not receiving this procedure.  We will proceed with procedure as planned.

## 2024-11-11 NOTE — OPERATIVE REPORT
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   Assistant(s):  1. Janet Ardon, MSN, FNP-BC  2. Valente Navarro SA  Anesthesia: Epidural/Femoral Nerve Block  Anesthesiologist:  Estimated blood loss: 50 milliliters   Drains: 15 [place] Hemovac  Specimens: 3 tissue cultures knee fluid, tibia, femur.  One frozen section sent intraoperatively   Complications: None apparent     INDICATIONS: Thanh Espinoza is a pleasant  who presented to my office with a long history of knee pain following a primary TKA.  They initially did very well but has had increasing pain over the past year and a half. Their pain was mostly with starting to walk and upon trying to rise from a chair. The pain had become debilitating for them and they failed conservative therapy.  We discussed surgical treatment options including revision total knee arthroplasty.  He had a negative infectious work-up including an aspirate and a bone scan demonstrated suspected loosening of both components. Prior to surgery we discussed the risks, benefits, alternatives to surgery, and surgical convalescence. Surgical consent was obtained both in the office, as well as the day of surgery. Risks of the procedure include, but are not limited to, infection, DVT, PE, damage to nerves or blood vessels at the time of surgery, bleeding and blood loss, stiffness/arthrofibrosis, and risk of loosening or failure of the  components requiring revision surgery. We also discussed the possibility of an occult infection and that multiple specimens would be taken in the operating room. Should these demonstrate anything concerning then he may require prolonged antibiotics and even an eventual two-staged procedure. The patient expressed understanding and wished to proceed with the surgery. An informed consent form was signed and placed on the chart prior to coming to the Operating Room.     PROCEDURE: The patient was brought to the operating room after lower extremity nerve blocks had been performed. Once obtaining satisfactory anesthesia was placed in the supine position. The knee was prepped and draped in the usual sterile fashion for a total knee replacement. Pre-operative antibiotics were given following SCIP guidelines. The leg was exsanguinated with an Esmarch bandage and the pneumatic tourniquet inflated to 250 mm mercury. The previous longitudinal incision was opened over the anterior aspect of the knee exposing the extensor mechanism. An arthrotomy was performed and the patella displaced laterally.   The gross pathologic findings revealed moderate synovitis and some osteolysis especially beneath the femoral component. Subperiosteal dissection was continued around the proximal medial tibia. The necessary soft tissue release was performed to correct the fixed angular deformity. Care was taken to protect and preserve the medial collateral ligament. The tibial polyethylene was removed and demonstrated some mild backside wear but did not demonstrated catastrophic failure. A culture was taken of the synovium. A saw and osteotomes was then inserted between the femoral component and the cement interface. The component was removed with minimal bone loss. The cement was then removed from the femur and a tissue culture was sent from the femoral surface. Moderate osteolysis was seen both on the posterior condyles and distally. The soft tissue  membrane was then removed and all cement and debris was removed from the femur. All areas of inflamed synovium were removed. There tibial component was removed in similar fashion. The cement was then removed from the proximal tibia utilizing osteotomes. A smaller area of osteolysis was seen beneath the tibia tray. A culture was then sent from the tibial surface. Extensive scar tissue was removed from both the medial and lateral gutters, as well as the anterior interval.   The tibia and femoral canals were then sequentially reamed until cortical contact was achieved. The femur achieved a diameter of 12 mm and tibia achieved a diameter of 12 mm. Using the intramedullary stems the proximal tibia was then recut to achieve a flush surface. A trial tibial tray size C achieved a good fit and was then reamed and broached for the keel and prepared for a central cone. A tibial trial prosthesis was then assembled and a good fit was observed.   The epicondylar axis of the femur was marked and then a trial femur was placed. Using this as a guide, augments were then determined and cuts were made to allow a more flush apposition of the components with the bone. The intercondylar notch was then resected and the remnants of the PCL were removed. A trial prosthesis was then assembled with augments and then impacted into position. A good fit and rotation was observed. A trial polyethylene was then inserted.   The trial components were then placed and the knee was found to have proper alignment and was stable through a full range of flexion and extension.   Scar tissue was then removed from around the patellar component and it was stable.     The knee was then thoroughly washed using pulsatile irrigation. The components were then assembled on the back table prior to implantation. Cement was mixed and a hybrid cement technique was utilized for fixation. The trial components were removed and the bony surfaces were cleaned with pulsatile  lavage and an antibiotic solution. The bone was then dried and the permanent components were cemented in a sequential fashion. Cement was first placed on the proximal tibia and on the tibia and stem interface. The tibial component was then set in the proper position and rotation. The tibial component was impacted into place, it was fully seated and excess cemented was removed. The femoral component was then cemented in place. Excess cement was removed.   A mixture of 30cc of 0.5% ropivicaine, 10mg Morphine, 30mg toradol, 0.2mg of epinephrine were injected into the knee joint capsule posteriorly, in the medial and lateral gutters, and at the capsulotomy incision sites prior to closure for local anesthesia.   Once the cement was hardened a trial tibial articular surface was implanted. The knee was then reduced and found to have good flexion, full extension with good stability and proper alignment. The patella tracked central. The trial was removed and the final surface was implanted.   A dilute (0.35%) betadine lavage was placed in the arthrotomy site to soak the components for 3 minutes prior to wound closure. The solution was made by adding 17.5 ml of 10% povidone-iodine in 500 cc of normal saline, resulting in a 0.35% dilution. This solution was then removed from the knee and the knee was copiously irrigated.  The tourniquet was then released and hemostasis was obtained with electrocautery. The knee irrigated with pulsatile lavage and antibiotic solution followed by normal saline. A hemovac drain was inserted and brought through a separate incision on the anterolateral thigh.   The arthrotomy was closed with # 1 running barbed sutures with #1 vicryl interrupted as backup. The subcutaneous tissue was closed in layers with # 0 and # 2-0 Vicryl interrupted sutures. The skin was closed with running monocryl and glue. A sterile compressive dressing was applied and the drain activated with suction. The patient tolerated  the procedure well, without complication, and was transferred to the recovery room in a stable condition. The sponge and instrument count was correct.   A surgical first assistant was required and necessary for the completion of this case to aid in manipulation and positioning of the extremity while the surgeon operated.  Their assistance was vital to the safety and success of the procedure.     Deep venous thrombosis prophylaxis will consist of aspirin twice daily for 6 weeks.  Wbat but will keep locked in knee brace in extension for 1 week for soft tissue rest  Pain control  24 hours abx and 10 days prophylactic abx   F/u in 2 weeks

## 2024-11-11 NOTE — ANESTHESIA PREPROCEDURE EVALUATION
PRE-OP EVALUATION    Patient Name: Thanh Espinoza    Admit Diagnosis: OBESITY , MORBID, STATUS POST TENDON REPAIR    Pre-op Diagnosis: OBESITY , MORBID, STATUS POST TENDON REPAIR    RIGHT REVISION TOTAL KNEE ARTHROPLASTY    Anesthesia Procedure: RIGHT REVISION TOTAL KNEE ARTHROPLASTY (Right)    Surgeons and Role:     * Jass Burgess MD - Primary    Pre-op vitals reviewed.  Temp: 97.6 °F (36.4 °C)  Pulse: 94  Resp: 18  BP: 142/78  SpO2: 98 %  Body mass index is 34.86 kg/m².    Current medications reviewed.  Hospital Medications:   acetaminophen (Tylenol Extra Strength) tab 1,000 mg  1,000 mg Oral Once    lactated ringers infusion   Intravenous Continuous    tranexamic acid in sodium chloride 0.7% (Cyklokapron) 1000 mg/100mL infusion premix 1,000 mg  1,000 mg Intravenous Once    dextrose 50% injection 50 mL  50 mL Intravenous Q15 Min PRN    povidone-iodine (Betadine) 10 % 17.5 mL in sodium chloride 0.9 % 500 mL irrigation   Irrigation Once (Intra-Op)       Outpatient Medications:   Prescriptions Prior to Admission[1]    Allergies: Cefazolin, Metformin, Norco [hydrocodone-acetaminophen], Other, and Tramadol      Anesthesia Evaluation        Anesthetic Complications           GI/Hepatic/Renal      (+) GERD       (+) chronic renal disease and CRI                   Cardiovascular  Comment: Nuclear stress test 11/2020    Impressions:     - Normal nuclear perfusion study.   - There is no evidence of myocardial ischemia.   - There is no evidence of myocardial infarction.   - Normal regional wall thickening is noted on gated SPECT analysis.   - Normal ejection fraction.     TTE 10/2020  Summary:     1. Left ventricle: The cavity size is normal. Wall thickness is mildly      increased. Systolic function is normal. The estimated ejection fraction      is 55-60%. Wall motion is normal; there are no regional wall motion      abnormalities.   2. Right ventricle: Systolic function is normal. Right ventricular systolic      pressure  could not be estimated.   3. Pericardium, extracardiac: There is no significant pericardial effusion.   4. Limited study due to poor acoustic windows.   5. No significant valvular abnormalities.                     (+) obesity  (+) hypertension                     (+) CHF                Endo/Other      (+) diabetes                            Pulmonary      (+) asthma  (+) COPD            (+) sleep apnea       Neuro/Psych      (+) depression       (+) TIA    (+) neuromuscular disease                     Past Surgical History:   Procedure Laterality Date    Brain surgery      pituitary tumor removed           x4     Carpal tunnel release      Left    Cataract Right 2021    Colonoscopy  2014    normal per patient    Knee arthroscopy Right 2020    Dr Varela    Knee replacement surgery      Other surgical history      LEFT ANKLE orif & 6 months rem of Hardware    Other surgical history  1985    pituitary adenoma resection    Other surgical history      csection x3    Tonsillectomy      and adenoidectomy for sleep apnea    Total knee replacement  10/2013    left partial knee replacement     Social History     Socioeconomic History    Marital status:    Tobacco Use    Smoking status: Former     Current packs/day: 0.00     Average packs/day: 0.8 packs/day for 45.0 years (33.8 ttl pk-yrs)     Types: Cigarettes     Start date: 1974     Quit date: 2018     Years since quittin.8    Smokeless tobacco: Never    Tobacco comments:     max 1 pk/dy, 0.75 pk/dy    Vaping Use    Vaping status: Never Used   Substance and Sexual Activity    Alcohol use: No     Alcohol/week: 0.0 standard drinks of alcohol    Drug use: No     History   Drug Use No     Available pre-op labs reviewed.               Airway      Mallampati: II  Mouth opening: >3 FB  TM distance: > 6 cm  Neck ROM: full Cardiovascular      Rhythm: regular  Rate: normal     Dental      Dental appliance(s): upper dentures        Pulmonary    Pulmonary exam normal.  Breath sounds clear to auscultation bilaterally.               Other findings              ASA: 3   Plan: regional and spinal  NPO status verified and patient meets guidelines.    Post-procedure pain management plan discussed with surgeon and patient.  Surgeon requests: regional block  Comment: I explained the intrinsic risks of MAC anesthesia to Thanh Espinoza including intraoperative awareness/recall, PONV, post-operative pain/discomfort, risk of aspiration, sore throat, airway management and, conversion to general anesthesia. Pt endorses understanding. All questions answered and concerns addressed.    Per surgeon's request, I also explained option and benefit of adductor canal block including significant pain relief following the surgical procedure and decreased need for postoperative opioid use. Realistic expectation for block duration was discussed as well. I also explained possible downsides of peripheral nerve blocks including failed block, prolonged nerve blockade leading to prolonged numbness in upper extremity and hand, with possible muscle weakness, and hematoma formation. Other exceedingly rare complications such as local anesthetic systemic toxicity (LAST), infection, and permanent nerve damage was also discussed. All questions were answered and patient demonstrated understanding of realistic expectations and risks of peripheral nerve blocks, and wishes to proceed with the procedure. Site of block was marked by me with patient confirmation.              Plan/risks discussed with: patient  Use of blood product(s) discussed with: patient    Consented to blood products.          Present on Admission:  **None**             [1]   Medications Prior to Admission   Medication Sig Dispense Refill Last Dose/Taking    Semaglutide (OZEMPIC, 2 MG/DOSE, SC) Inject 50 mg into the skin once a week.   Taking    QUEtiapine 100 MG Oral Tab Take 1-1.5 tablets (100-150 mg total) by mouth  nightly as needed (sleep and mood). This is an increase. 90 tablet 0 Taking As Needed    diazePAM 5 MG Oral Tab Take 1 tablet (5 mg total) by mouth daily as needed for Anxiety (Do NOT take within 6 hours of narcotic pain medicines.). 30 tablet 2 Taking As Needed    lamoTRIgine 100 MG Oral Tab Take 1 tablet (100 mg total) by mouth every morning. 90 tablet 1 Taking    lamoTRIgine 200 MG Oral Tab Take 1 tablet (200 mg total) by mouth every morning. Take with [id number] total daily 90 tablet 1 Taking    venlafaxine  MG Oral Capsule SR 24 Hr Take 1 capsule (150 mg total) by mouth daily with breakfast. 90 capsule 1 Taking    Melatonin 3 MG Oral Tablet Dispersible Take 3-6 mg by mouth nightly as needed (for sleep). 90 tablet 1 Taking As Needed    montelukast 10 MG Oral Tab Take 1 tablet (10 mg total) by mouth nightly.   Taking    Insulin Aspart Pen (NOVOLOG FLEXPEN) 100 UNIT/ML Subcutaneous Solution Pen-injector FOLLOW SLIDING SCALE FOR -170 : 1 UNIT, -220: 3 UNITS, 221-270: 5 UNITS, 271-320: 7 UNITS, >320: 9 UNITS INJECT INTO THE SKIN 3 (THREE) TIMES DAILY BEFORE MEALS. 15 mL 1 Taking    omeprazole 20 MG Oral Capsule Delayed Release Take 1 capsule (20 mg total) by mouth before breakfast. 90 capsule 3 Taking    dilTIAZem HCl ER (DILT-XR) 180 MG Oral Capsule SR 24 Hr Take 1 capsule (180 mg total) by mouth daily. 90 capsule 3 Taking    rosuvastatin 40 MG Oral Tab Take 1 tablet (40 mg total) by mouth nightly. 90 tablet 3 Taking    glipiZIDE 5 MG Oral Tab Take 0.5 tablets (2.5 mg total) by mouth 2 (two) times daily before meals.       metoprolol succinate ER 50 MG Oral Tablet 24 Hr Take 1 tablet (50 mg total) by mouth daily.       spironolactone 50 MG Oral Tab Take 1 tablet (50 mg total) by mouth daily.       Ferrous Sulfate 325 (65 Fe) MG Oral Tab Take 1 tablet (325 mg total) by mouth daily.       docusate sodium 100 MG Oral Cap Take 100 mg by mouth 2 (two) times daily.       LANTUS SOLOSTAR 100 UNIT/ML  Subcutaneous Solution Pen-injector INJECT 11 UNITS INTO THE SKIN NIGHTLY. - NEED MEDICARE PART B CARD IN PERSON (Patient taking differently: as needed. Sliding scale) 15 each 5     Continuous Blood Gluc Sensor (FREESTYLE MADALYN 14 DAY SENSOR) Does not apply Misc Use as directed 3 each 5     Insulin Pen Needle (PEN NEEDLES 5/16\") 31G X 8 MM Does not apply Misc Use with novolog pen injector three times daily 100 each 1     Insulin Pen Needle (BD PEN NEEDLE ORIGINAL U/F) 29G X 12.7MM Does not apply Misc USE DAILY AND THREE TIMES A DAY WITH NOVOLOG 100 each 2     Continuous Blood Gluc  (FREESTYLE MADALYN 14 DAY READER) Does not apply Device Take 1 Bottle by mouth As Directed.       Losartan Potassium 25 MG Oral Tab Take 1 tablet (25 mg total) by mouth daily. 90 tablet 1     Insulin Pen Needle (PEN NEEDLES 5/16\") 30G X 8 MM Does not apply Misc Use as directed with lantus 100 each 5     Alcohol Swabs (BD SWAB SINGLE USE REGULAR) Does not apply Pads USE AS DIRECTED 100 each 3     Accu-Chek FastClix Lancets Does not apply Misc USE AS DIRECTED 102 each 3     Glucose Blood (ACCU-CHEK GUIDE) In Vitro Strip USE AS DIRECTED 100 strip 3     Blood Glucose Monitoring Suppl (ACCU-CHEK DAMARI SMARTVIEW) w/Device Does not apply Kit Test once daily 1 kit 0

## 2024-11-12 VITALS
WEIGHT: 196.81 LBS | OXYGEN SATURATION: 95 % | SYSTOLIC BLOOD PRESSURE: 138 MMHG | BODY MASS INDEX: 34.87 KG/M2 | HEART RATE: 91 BPM | RESPIRATION RATE: 18 BRPM | DIASTOLIC BLOOD PRESSURE: 55 MMHG | TEMPERATURE: 98 F | HEIGHT: 63 IN

## 2024-11-12 LAB
ALBUMIN SERPL-MCNC: 3.6 G/DL (ref 3.2–4.8)
ANION GAP SERPL CALC-SCNC: 2 MMOL/L (ref 0–18)
ANION GAP SERPL CALC-SCNC: 3 MMOL/L (ref 0–18)
BUN BLD-MCNC: 10 MG/DL (ref 9–23)
BUN BLD-MCNC: 14 MG/DL (ref 9–23)
CALCIUM BLD-MCNC: 8.9 MG/DL (ref 8.7–10.4)
CALCIUM BLD-MCNC: 9.4 MG/DL (ref 8.7–10.4)
CHLORIDE SERPL-SCNC: 103 MMOL/L (ref 98–112)
CHLORIDE SERPL-SCNC: 104 MMOL/L (ref 98–112)
CO2 SERPL-SCNC: 26 MMOL/L (ref 21–32)
CO2 SERPL-SCNC: 27 MMOL/L (ref 21–32)
CREAT BLD-MCNC: 1.04 MG/DL
CREAT BLD-MCNC: 1.27 MG/DL
EGFRCR SERPLBLD CKD-EPI 2021: 47 ML/MIN/1.73M2 (ref 60–?)
EGFRCR SERPLBLD CKD-EPI 2021: 59 ML/MIN/1.73M2 (ref 60–?)
ERYTHROCYTE [DISTWIDTH] IN BLOOD BY AUTOMATED COUNT: 15 %
EST. AVERAGE GLUCOSE BLD GHB EST-MCNC: 148 MG/DL (ref 68–126)
GLUCOSE BLD-MCNC: 158 MG/DL (ref 70–99)
GLUCOSE BLD-MCNC: 180 MG/DL (ref 70–99)
GLUCOSE BLD-MCNC: 324 MG/DL (ref 70–99)
GLUCOSE BLD-MCNC: 343 MG/DL (ref 70–99)
HBA1C MFR BLD: 6.8 % (ref ?–5.7)
HCT VFR BLD AUTO: 32.2 %
HGB BLD-MCNC: 10.7 G/DL
MAGNESIUM SERPL-MCNC: 1.8 MG/DL (ref 1.6–2.6)
MCH RBC QN AUTO: 28.3 PG (ref 26–34)
MCHC RBC AUTO-ENTMCNC: 33.2 G/DL (ref 31–37)
MCV RBC AUTO: 85.2 FL
OSMOLALITY SERPL CALC.SUM OF ELEC: 276 MOSM/KG (ref 275–295)
OSMOLALITY SERPL CALC.SUM OF ELEC: 290 MOSM/KG (ref 275–295)
PHOSPHATE SERPL-MCNC: 3.8 MG/DL (ref 2.4–5.1)
PLATELET # BLD AUTO: 235 10(3)UL (ref 150–450)
POTASSIUM SERPL-SCNC: 4.6 MMOL/L (ref 3.5–5.1)
POTASSIUM SERPL-SCNC: 5.3 MMOL/L (ref 3.5–5.1)
RBC # BLD AUTO: 3.78 X10(6)UL
SODIUM SERPL-SCNC: 132 MMOL/L (ref 136–145)
SODIUM SERPL-SCNC: 133 MMOL/L (ref 136–145)
WBC # BLD AUTO: 14.1 X10(3) UL (ref 4–11)

## 2024-11-12 PROCEDURE — 3E0333Z INTRODUCTION OF ANTI-INFLAMMATORY INTO PERIPHERAL VEIN, PERCUTANEOUS APPROACH: ICD-10-PCS | Performed by: ORTHOPAEDIC SURGERY

## 2024-11-12 PROCEDURE — 82962 GLUCOSE BLOOD TEST: CPT

## 2024-11-12 PROCEDURE — 83735 ASSAY OF MAGNESIUM: CPT

## 2024-11-12 PROCEDURE — 80069 RENAL FUNCTION PANEL: CPT

## 2024-11-12 PROCEDURE — 85018 HEMOGLOBIN: CPT | Performed by: ORTHOPAEDIC SURGERY

## 2024-11-12 PROCEDURE — 83036 HEMOGLOBIN GLYCOSYLATED A1C: CPT

## 2024-11-12 PROCEDURE — 97116 GAIT TRAINING THERAPY: CPT

## 2024-11-12 PROCEDURE — 97161 PT EVAL LOW COMPLEX 20 MIN: CPT

## 2024-11-12 PROCEDURE — 85014 HEMATOCRIT: CPT | Performed by: ORTHOPAEDIC SURGERY

## 2024-11-12 PROCEDURE — 85027 COMPLETE CBC AUTOMATED: CPT

## 2024-11-12 RX ORDER — PREGABALIN 75 MG/1
75 CAPSULE ORAL DAILY
Status: SHIPPED | COMMUNITY
Start: 2024-11-12

## 2024-11-12 RX ORDER — SENNA AND DOCUSATE SODIUM 50; 8.6 MG/1; MG/1
1 TABLET, FILM COATED ORAL DAILY
Status: SHIPPED | COMMUNITY
Start: 2024-11-12

## 2024-11-12 RX ORDER — TRAMADOL HYDROCHLORIDE 50 MG/1
50 TABLET ORAL
Status: SHIPPED | COMMUNITY
Start: 2024-11-12

## 2024-11-12 RX ORDER — SPIRONOLACTONE 50 MG/1
50 TABLET, FILM COATED ORAL DAILY
Status: SHIPPED | COMMUNITY
Start: 2024-11-18

## 2024-11-12 RX ORDER — CELECOXIB 200 MG/1
200 CAPSULE ORAL DAILY
Status: SHIPPED | COMMUNITY
Start: 2024-11-12

## 2024-11-12 RX ORDER — OXYCODONE HYDROCHLORIDE 5 MG/1
5 TABLET ORAL EVERY 4 HOURS PRN
Status: SHIPPED | COMMUNITY
Start: 2024-11-12

## 2024-11-12 RX ORDER — ACETAMINOPHEN 325 MG/1
650 TABLET ORAL EVERY 4 HOURS
Status: SHIPPED | COMMUNITY
Start: 2024-11-12

## 2024-11-12 RX ORDER — ASPIRIN 81 MG/1
81 TABLET ORAL 2 TIMES DAILY
Status: SHIPPED | COMMUNITY
Start: 2024-11-12

## 2024-11-12 RX ORDER — LOSARTAN POTASSIUM 25 MG/1
25 TABLET ORAL DAILY
Status: SHIPPED | COMMUNITY
Start: 2024-11-18

## 2024-11-12 NOTE — PROGRESS NOTES
Progress Note    Patient: Thanh Espinoza  Medical record #: [id number]    Thanh Espinoza is a 64 year old y/o female who is POD #1 Revision right TKA.  The patient's main complaint today is surgical pain at the operative site.  No numbness or tingling.  No chest pain or shortness of breath.      Hospital Problem List:  Active Problems:    Pre-op testing      Current Medications:   lactated ringers infusion   Intravenous Continuous    [COMPLETED] tranexamic acid in sodium chloride 0.7% (Cyklokapron) 1000 mg/100mL infusion premix 1,000 mg  1,000 mg Intravenous Once    tiZANidine (Zanaflex) partial tab 1 mg  1 mg Oral Q6H PRN    [COMPLETED] dexAMETHasone PF (Decadron) 10 MG/ML injection 8 mg  8 mg Intravenous Once    acetaminophen (Tylenol) tab 650 mg  650 mg Oral Q4H While awake    oxyCODONE immediate release tab 5 mg  5 mg Oral Q4H PRN    Or    oxyCODONE immediate release tab 10 mg  10 mg Oral Q4H PRN    HYDROmorphone (Dilaudid) 1 MG/ML injection 0.4 mg  0.4 mg Intravenous Q2H PRN    Or    HYDROmorphone (Dilaudid) 1 MG/ML injection 0.8 mg  0.8 mg Intravenous Q2H PRN    [COMPLETED] clonidine/epinephrine/ropivacaine/ketorolac in 0.9% NaCl 60 mL pain cocktail syringe for knee arthroplasty   Infiltration Once (Intra-Op)    sodium chloride 0.9 % IV bolus 500 mL  500 mL Intravenous Once PRN    sennosides (Senokot) tab 17.2 mg  17.2 mg Oral Nightly    docusate sodium (Colace) cap 100 mg  100 mg Oral BID    polyethylene glycol (PEG 3350) (Miralax) 17 g oral packet 17 g  17 g Oral Daily PRN    magnesium hydroxide (Milk of Magnesia) 400 MG/5ML oral suspension 30 mL  30 mL Oral Daily PRN    bisacodyl (Dulcolax) 10 MG rectal suppository 10 mg  10 mg Rectal Daily PRN    fleet enema (Fleet) rectal enema 133 mL  1 enema Rectal Once PRN    famotidine (Pepcid) tab 20 mg  20 mg Oral BID    Or    famotidine (Pepcid) 20 mg/2mL injection 20 mg  20 mg Intravenous BID    ondansetron (Zofran) 4 MG/2ML injection 4 mg  4 mg Intravenous Q6H PRN     metoclopramide (Reglan) 5 mg/mL injection 10 mg  10 mg Intravenous Q8H PRN    [] diphenhydrAMINE (Benadryl) 50 mg/mL  injection 25 mg  25 mg Intravenous Once PRN    diphenhydrAMINE (Benadryl) cap/tab 25 mg  25 mg Oral Q4H PRN    Or    diphenhydrAMINE (Benadryl) 50 mg/mL  injection 12.5 mg  12.5 mg Intravenous Q4H PRN    ferrous sulfate DR tab 325 mg  325 mg Oral Daily with breakfast    aspirin DR tab 81 mg  81 mg Oral BID    [COMPLETED] ceFAZolin (Ancef) 2g in 10mL IV syringe premix  2 g Intravenous Q8H    losartan (Cozaar) tab 25 mg  25 mg Oral Daily    metoprolol succinate ER (Toprol XL) 24 hr tab 50 mg  50 mg Oral Daily    spironolactone (Aldactone) tab 25 mg  25 mg Oral Daily    dilTIAZem ER (CardIZEM CD) 24 hr cap 180 mg  180 mg Oral Daily    lamoTRIgine (LaMICtal) tab 100 mg  100 mg Oral QAM    lamoTRIgine (LaMICtal) tab 200 mg  200 mg Oral QAM    levothyroxine (Synthroid) tab 25 mcg  25 mcg Oral QAM AC    cetirizine (ZyrTEC) tab 10 mg  10 mg Oral Daily    montelukast (Singulair) tab 10 mg  10 mg Oral Nightly    pantoprazole (Protonix) DR tab 20 mg  20 mg Oral QAM AC    QUEtiapine (SEROquel) tab 100 mg  100 mg Oral Nightly PRN    rosuvastatin (Crestor) tab 40 mg  40 mg Oral Nightly    venlafaxine ER (Effexor-XR) 24 hr cap 150 mg  150 mg Oral Daily with breakfast    insulin aspart (NovoLOG) 100 Units/mL FlexPen 2-10 Units  2-10 Units Subcutaneous TID AC and HS    glucose (Dex4) 15 GM/59ML oral liquid 15 g  15 g Oral Q15 Min PRN    Or    glucose (Glutose) 40% oral gel 15 g  15 g Oral Q15 Min PRN    Or    glucose-vitamin C (Dex-4) chewable tab 4 tablet  4 tablet Oral Q15 Min PRN    Or    dextrose 50% injection 50 mL  50 mL Intravenous Q15 Min PRN    Or    glucose (Dex4) 15 GM/59ML oral liquid 30 g  30 g Oral Q15 Min PRN    Or    glucose (Glutose) 40% oral gel 30 g  30 g Oral Q15 Min PRN    Or    glucose-vitamin C (Dex-4) chewable tab 8 tablet  8 tablet Oral Q15 Min PRN    melatonin tab 3 mg  3 mg Oral  Nightly PRN    Or    melatonin tab 6 mg  6 mg Oral Nightly PRN    [COMPLETED] povidone-iodine (Betadine) 10 % 17.5 mL in sodium chloride 0.9 % 500 mL irrigation   Irrigation Once (Intra-Op)         Input/Output:    Intake/Output Summary (Last 24 hours) at 11/12/2024 0856  Last data filed at 11/12/2024 0730  Gross per 24 hour   Intake 2100 ml   Output 2200 ml   Net -100 ml       Vital signs:  Blood pressure 138/51, pulse 90, temperature 98.3 °F (36.8 °C), temperature source Oral, resp. rate 20, height 5' 3\" (1.6 m), weight 196 lb 12.8 oz (89.3 kg), SpO2 96%, not currently breastfeeding.    Physical Exam:     right Knee:  Dressing: clean and dry  Able to dorsiflex ankle and move toes  Sensation grossly intact to light touch throughout  Distal pulses intact  No calf swelling  James's sign negative  Compartments soft  No signs or symptoms of DVT or compartment syndrome      Labs:   Lab Results   Component Value Date    WBC 14.1 11/12/2024    HGB 10.7 11/12/2024    HCT 32.2 11/12/2024    .0 11/12/2024    CREATSERUM 1.04 11/12/2024    BUN 10 11/12/2024     11/12/2024    K 5.3 11/12/2024     11/12/2024    CO2 27.0 11/12/2024     11/12/2024    CA 9.4 11/12/2024    ALB 3.6 11/12/2024    MG 1.8 11/12/2024    PHOS 3.8 11/12/2024         Assessment: 64 year old  female POD #1 Revision right TKA    Plan:  Hgb - 10.7, acute blood loss anemia, consistent with post op changes, asymptomatic , continue to monitor  Yudith-op Glucose Goal control <140  drain out POD #1   mobilize with PT, WBAT on operative knee with walker  pain controlled with meds. Knee immobilizer in place for 1 week for soft tissue rest. Ok to remove if keeping leg straight in bed or for hygiene \  Asa 81mg BID for 6 weeks for dvt ppx  Neg intra-op cultures to date    Leave dressing in place for one week     Disposition: plan discharge home today vs rehab on POD #2 if doing well with PT    Follow up with Dr. Burgess- 2-3 weeks after  discharge home or 2-3 weeks after discharge from rehab    Followed by Physician group for medical conditions to include Active Problems:    Pre-op testing        Signed:  Jass Burgess MD  11/12/2024  8:56 AM

## 2024-11-12 NOTE — PROGRESS NOTES
NURSING DISCHARGE NOTE    Discharged Home via Wheelchair.  Accompanied by Spouse  Belongings Taken by patient/family.  AVS printed and discussed with patient.  Instructed patient to follow up with PCP regarding lab orders to recheck BMP.

## 2024-11-12 NOTE — CONSULTS
General Medicine Consult      Reason for consult:    Consulted by:     PCP: Haydee English DO      History of Present Illness: Patient is a 64 year old female with PMH sig for *** presents with ***.  Patient states the symptom started *** with a duration of ***.  Symptoms are characterized as *** with *** radiations.  Associated symptoms are *** present.  No exacerbating or alleviating symptoms are appreciated.        PMH:  Past Medical History:    LANDRY (acute kidney injury) (Formerly [facility])    hosp [facility]    Anxiety disorder    Anxiety state    Arrhythmia    currently wearing a heart monitor , poss a flutter     Asthma (Formerly [facility])    denies    Back pain    Bipolar disorder (Formerly [facility])    sees psych, Dr Qureshi    Congestive heart disease (Formerly [facility])    few years ago    Depression    Emphysema of lung (Formerly [facility])    Esophageal reflux    Essential hypertension    Hearing impairment    High blood pressure    High cholesterol    Hyperlipidemia    Obesity    Obstructive sleep apnea (adult) (pediatric)    MARCELA (obstructive sleep apnea)    AHI-14    Osteoarthritis    PCOS (polycystic ovarian syndrome)    Post partum depression    Renal disorder    CKD    Seizure disorder (Formerly [facility])    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; used to have seizures before    Sleep apnea    no cpap - patient denies sleep apnea    TIA (transient ischemic attack)    Type 2 diabetes mellitus with stage 3 chronic kidney disease, without long-term current use of insulin (Formerly [facility])    As per chart note on 19    Type II or unspecified type diabetes mellitus without mention of complication, not stated as uncontrolled    off metformin due to LANDRY        PSH:  Past Surgical History:   Procedure Laterality Date    Brain surgery      pituitary tumor removed           x4     Carpal tunnel release      Left    Cataract Right 2021    Colonoscopy      normal per patient    Knee arthroscopy Right 2020    Dr Varela    Knee replacement surgery      Other surgical history       LEFT ANKLE orif & 6 months rem of Hardware    Other surgical history  1985    pituitary adenoma resection    Other surgical history      csection x3    Tonsillectomy      and adenoidectomy for sleep apnea    Total knee replacement  10/2013    left partial knee replacement        Home Medications:  Medications Taking[1]    Scheduled Medication:   acetaminophen  650 mg Oral Q4H While awake    sennosides  17.2 mg Oral Nightly    docusate sodium  100 mg Oral BID    famotidine  20 mg Oral BID    Or    famotidine  20 mg Intravenous BID    ferrous sulfate  325 mg Oral Daily with breakfast    aspirin  81 mg Oral BID    losartan  25 mg Oral Daily    metoprolol succinate ER  50 mg Oral Daily    spironolactone  25 mg Oral Daily    dilTIAZem HCl ER  180 mg Oral Daily    lamoTRIgine  100 mg Oral QAM    lamoTRIgine  200 mg Oral QAM    levothyroxine  25 mcg Oral QAM AC    cetirizine  10 mg Oral Daily    montelukast  10 mg Oral Nightly    pantoprazole  20 mg Oral QAM AC    rosuvastatin  40 mg Oral Nightly    venlafaxine ER  150 mg Oral Daily with breakfast    insulin aspart  2-10 Units Subcutaneous TID AC and HS     Continuous Infusing Medication:   lactated ringers 20 mL/hr at 24 1521     PRN Medication:  tiZANidine    oxyCODONE **OR** oxyCODONE    HYDROmorphone **OR** HYDROmorphone    sodium chloride    polyethylene glycol (PEG 3350)    magnesium hydroxide    bisacodyl    fleet enema    ondansetron    metoclopramide    diphenhydrAMINE **OR** diphenhydrAMINE    QUEtiapine    glucose **OR** glucose **OR** glucose-vitamin C **OR** dextrose **OR** glucose **OR** glucose **OR** glucose-vitamin C    melatonin **OR** melatonin     ALL:  Allergies[2]     Soc Hx:  Social History     Tobacco Use    Smoking status: Former     Current packs/day: 0.00     Average packs/day: 0.8 packs/day for 45.0 years (33.8 ttl pk-yrs)     Types: Cigarettes     Start date: 1974     Quit date: 2018     Years since quittin.8    Smokeless  tobacco: Never    Tobacco comments:     max 1 pk/dy, 0.75 pk/dy    Substance Use Topics    Alcohol use: No     Alcohol/week: 0.0 standard drinks of alcohol        Fam Hx  Family History   Problem Relation Age of Onset    Diabetes Mother     Hypertension Mother     Obesity Mother     Cancer Sister         twin breast    OCD Sister     Breast Cancer Sister 61        age at dx 61    Alcohol and Other Disorders Associated Father     Bipolar Disorder Father         possible    Bipolar Disorder Brother         unsure    Breast Cancer Sister 59        age at dx 59    Obesity Daughter     Bipolar Disorder Daughter         also adhd daughter    ADHD Daughter     Other (MS) Daughter     Bipolar Disorder Son         2 sons and one daughter have bipolar and adhd is on seroquel, ritalin,     ADHD Son        Review of Systems  Comprehensive ROS reviewed and negative except for what's stated above.  Including negative for chest pain, shortness of breath, syncope.       OBJECTIVE:  /51 (BP Location: Left arm)   Pulse 90   Temp 98.3 °F (36.8 °C) (Oral)   Resp 20   Ht 5' 3\" (1.6 m)   Wt 196 lb 12.8 oz (89.3 kg)   LMP  (LMP Unknown)   SpO2 96%   BMI 34.86 kg/m²   General:  Alert, no distress, appears stated age.   Head:  Normocephalic, without obvious abnormality, atraumatic.   Eyes:  Sclera anicteric, No conjunctival pallor, EOMs intact.    Nose: Nares normal. Septum midline. Mucosa normal. No drainage.   Throat: Lips, mucosa, and tongue normal. Teeth and gums normal.   Neck: Supple, symmetrical, trachea midline, no cervical or supraclavicular lymph adenopathy, thyroid: no enlargment/tenderness/nodules appreciated   Lungs:   Clear to auscultation bilaterally. Normal effort   Chest wall:  No tenderness or deformity.   Heart:  Regular rate and rhythm, S1, S2 normal, no murmur, rub or gallop appreciated   Abdomen:   Soft, non-tender. Bowel sounds normal. No masses,  No organomegaly. Non distended   Extremities: Extremities  normal, atraumatic, no cyanosis or edema.   Skin: Skin color, texture, turgor normal. No rashes or lesions.    Neurologic: Normal strength, no focal deficit appreciated       Diagnostics:   CBC/Chem  Recent Labs   Lab 11/12/24  0502   WBC 14.1*   HGB 10.7*   MCV 85.2   .0       Recent Labs   Lab 11/11/24  1355 11/12/24  0502    132*   K 4.2 5.3*    103   CO2 28.0 27.0   BUN  --  10   CREATSERUM  --  1.04*   GLU  --  158*   CA  --  9.4   MG  --  1.8   PHOS  --  3.8       Recent Labs   Lab 11/12/24  0502   ALB 3.6       No results for input(s): \"TROP\" in the last 168 hours.      Radiology: XR KNEE (1 OR 2 VIEWS), RIGHT (CPT=73560)    Result Date: 11/11/2024  CONCLUSION:  There is anatomic alignment of long stem knee prostheses of the right knee without evidence of fracture.   LOCATION:  [facility]   Dictated by (CST): Goyo Huggins MD on 11/11/2024 at 8:04 PM     Finalized by (CST): Goyo Huggins MD on 11/11/2024 at 8:05 PM          ASSESSMENT / PLAN:        Prophylaxis:   DVT with ***  Atrophy Prophylaxis ***  Lines/Monitors:     Dispo: ***  Code status:     Patient and/or patient's family given opportunity to ask questions and note understanding and agreeing with therapeutic plan as outlined    Thank you for allowing me to participate in the care of this patient.     Thank You,  Selina Hernandez DO    [facility] Hospitalist  Pager   Answering Service number: [phone]                           [1]   Outpatient Medications Marked as Taking for the 11/11/24 encounter (Hospital Encounter)   Medication Sig Dispense Refill    acetaminophen 325 MG Oral Tab Take 2 tablets (650 mg total) by mouth every 4 (four) hours. Ok to take over the counter and ok to continue past 2 weeks. Do not exceed 4,000 mg per day.      aspirin (ASPIRIN EC LOW DOSE) 81 MG Oral Tab EC Take 1 tablet (81 mg total) by mouth 2 (two) times daily. To take for 6 weeks total to prevent blood clots.      celecoxib (CELEBREX) 200 MG Oral Cap Take  1 capsule (200 mg total) by mouth daily.      pregabalin (LYRICA) 75 MG Oral Cap Take 1 capsule (75 mg total) by mouth daily.      oxyCODONE 5 MG Oral Tab Take 1 tablet (5 mg total) by mouth every 4 (four) hours as needed for Pain.      senna-docusate 8.6-50 MG Oral Tab Take 1 tablet by mouth daily. Take daily while taking narcotics regularly for pain.      traMADol 50 MG Oral Tab Take 1 tablet (50 mg total) by mouth every 4 to 6 hours as needed for Pain (Can alternate with Oxycodone). Do not take along with oxycodone or norco (hydrocodone) - separate by 2 hours if needed.      Naloxone HCl 4 MG/0.1ML Nasal Liquid 4 mg by Nasal route as needed. If patient remains unresponsive, repeat dose in other nostril 2-5 minutes after first dose. 1 kit 0    levothyroxine 25 MCG Oral Tab Take 1 tablet (25 mcg total) by mouth every morning before breakfast.      loratadine 10 MG Oral Tab       Semaglutide (OZEMPIC, 2 MG/DOSE, SC) Inject 50 mg into the skin once a week.      QUEtiapine 100 MG Oral Tab Take 1-1.5 tablets (100-150 mg total) by mouth nightly as needed (sleep and mood). This is an increase. 90 tablet 0    lamoTRIgine 100 MG Oral Tab Take 1 tablet (100 mg total) by mouth every morning. 90 tablet 1    lamoTRIgine 200 MG Oral Tab Take 1 tablet (200 mg total) by mouth every morning. Take with [id number] total daily 90 tablet 1    venlafaxine  MG Oral Capsule SR 24 Hr Take 1 capsule (150 mg total) by mouth daily with breakfast. 90 capsule 1    Melatonin 3 MG Oral Tablet Dispersible Take 3-6 mg by mouth nightly as needed (for sleep). 90 tablet 1    montelukast 10 MG Oral Tab Take 1 tablet (10 mg total) by mouth nightly.      Insulin Aspart Pen (NOVOLOG FLEXPEN) 100 UNIT/ML Subcutaneous Solution Pen-injector FOLLOW SLIDING SCALE FOR -170 : 1 UNIT, -220: 3 UNITS, 221-270: 5 UNITS, 271-320: 7 UNITS, >320: 9 UNITS INJECT INTO THE SKIN 3 (THREE) TIMES DAILY BEFORE MEALS. 15 mL 1    omeprazole 20 MG Oral  Capsule Delayed Release Take 1 capsule (20 mg total) by mouth before breakfast. 90 capsule 3    dilTIAZem HCl ER (DILT-XR) 180 MG Oral Capsule SR 24 Hr Take 1 capsule (180 mg total) by mouth daily. 90 capsule 3    rosuvastatin 40 MG Oral Tab Take 1 tablet (40 mg total) by mouth nightly. 90 tablet 3   [2]   Allergies  Allergen Reactions    Cefazolin HIVES    Metformin RENAL INSUFFICIENCY    Norco [Hydrocodone-Acetaminophen] ITCHING     Very bad itching    Other ITCHING     bandaid    Tramadol ITCHING

## 2024-11-12 NOTE — PLAN OF CARE
Problem: PAIN - ADULT  Goal: Verbalizes/displays adequate comfort level or patient's stated pain goal  Description: INTERVENTIONS:  - Encourage pt to monitor pain and request assistance  - Assess pain using appropriate pain scale  - Administer analgesics based on type and severity of pain and evaluate response  - Implement non-pharmacological measures as appropriate and evaluate response  - Consider cultural and social influences on pain and pain management  - Manage/alleviate anxiety  - Utilize distraction and/or relaxation techniques  - Monitor for opioid side effects  - Notify MD/LIP if interventions unsuccessful or patient reports new pain  - Anticipate increased pain with activity and pre-medicate as appropriate  Outcome: Adequate for Discharge     Problem: SAFETY ADULT - FALL  Goal: Free from fall injury  Description: INTERVENTIONS:  - Assess pt frequently for physical needs  - Identify cognitive and physical deficits and behaviors that affect risk of falls.  - [place] fall precautions as indicated by assessment.  - Educate pt/family on patient safety including physical limitations  - Instruct pt to call for assistance with activity based on assessment  - Modify environment to reduce risk of injury  - Provide assistive devices as appropriate  - Consider OT/PT consult to assist with strengthening/mobility  - Encourage toileting schedule  Outcome: Adequate for Discharge     Problem: SAFETY ADULT - FALL  Goal: Free from fall injury  Description: INTERVENTIONS:  - Assess pt frequently for physical needs  - Identify cognitive and physical deficits and behaviors that affect risk of falls.  - [place] fall precautions as indicated by assessment.  - Educate pt/family on patient safety including physical limitations  - Instruct pt to call for assistance with activity based on assessment  - Modify environment to reduce risk of injury  - Provide assistive devices as appropriate  - Consider OT/PT consult to assist  with strengthening/mobility  - Encourage toileting schedule  Outcome: Adequate for Discharge

## 2024-11-12 NOTE — OCCUPATIONAL THERAPY NOTE
OCCUPATIONAL THERAPY EVALUATION - INPATIENT    Room Number: 352/352-A  Evaluation Date: 11/12/2024     Type of Evaluation: Initial  Presenting Problem: 11/11 R TKA    Physician Order: IP Consult to Occupational Therapy  Reason for Therapy:  ADL/IADL Dysfunction and Discharge Planning    OCCUPATIONAL THERAPY ASSESSMENT   Patient is a 64 year old female admitted on 11/11/2024 with Presenting Problem: 11/11 R TKA.    Patient is currently functioning near baseline with toileting, lower body dressing, bed mobility, and transfers.  Prior to admission, patient's baseline is independent.  Patient met all OT goals at supervision level.  Patient reports no further questions/concerns at this time.     Discharge OT with no additional skilled services but increased support at home    Recommendations for nursing staff:   Transfers: x1 walker  Toileting location: Toilet    EVALUATION SESSION:  Patient at start of session: chair    FUNCTIONAL TRANSFER ASSESSMENT  Sit to Stand: Edge of Bed; Chair  Edge of Bed: Supervision  Chair: Supervision  Toilet Transfer: Supervision    BED MOBILITY  Supine to Sit : Supervision  Sit to Supine (OT): Supervision    BALANCE ASSESSMENT     FUNCTIONAL ADL ASSESSMENT  Grooming Standing: Supervision  LB Dressing Seated: Supervision  LB Dressing Standing: Supervision  Toileting Seated: Supervision    ACTIVITY TOLERANCE:                          O2 SATURATIONS       COGNITION  Overall Cognitive Status:  WFL - within functional limits    COGNITION ASSESSMENTS     Upper Extremity:   ROM: within functional limits   Strength: is within functional limits     EDUCATION PROVIDED  Patient Education : Role of Occupational Therapy; Functional Transfer Techniques; Plan of Care; Weight Bear Status; Discharge Recommendations; DME Recommendations; Proper Body Mechanics  Patient's Response to Education: Verbalized Understanding; Returned Demonstration    Equipment used: walker  Demonstrates functional use    Therapist  comments: Pt demonstrates ability to perform bed mobility, toilet tx, and LB dressing with supervision.     Patient End of Session: Up in chair;Needs met;Call light within reach;RN aware of session/findings;Alarm set;Ice applied    OCCUPATIONAL PROFILE    HOME SITUATION  Type of Home: Other (Comment) (Double Wide Trailer)  Home Layout: One level  Lives With: Spouse    Toilet and Equipment: Comfort height toilet  Shower/Tub and Equipment: Walk-in shower;Grab bar;Shower chair             Drives: Yes       Prior Level of Function: independent    SUBJECTIVE  Pt cooperative    PAIN ASSESSMENT  Ratin  Location: R Knee  Management Techniques: Activity promotion;Body mechanics;Repositioning    OBJECTIVE  Precautions: Bed/chair alarm  Fall Risk: Standard fall risk    WEIGHT BEARING RESTRICTION  R Lower Extremity: Weight Bearing as Tolerated      AM-PAC ‘6-Clicks’ Inpatient Daily Activity Short Form  -   Putting on and taking off regular lower body clothing?: A Little  -   Bathing (including washing, rinsing, drying)?: A Little  -   Toileting, which includes using toilet, bedpan or urinal? : A Little  -   Putting on and taking off regular upper body clothing?: None  -   Taking care of personal grooming such as brushing teeth?: None  -   Eating meals?: None    AM-PAC Score:  Score: 21  Approx Degree of Impairment: 32.79%  Standardized Score (AM-PAC Scale): 44.27    ADDITIONAL TESTS     NEUROLOGICAL FINDINGS      PLAN   Patient has been evaluated and presents with no skilled Occupational Therapy needs at this time.  Patient discharged from Occupational Therapy services.  Please re-order if a new functional limitation presents during this admission.         Patient Evaluation Complexity Level:   Occupational Profile/Medical History LOW - Brief history including review of medical or therapy records    Specific performance deficits impacting engagement in ADL/IADL LOW  1 - 3 performance deficits    Client  Assessment/Performance Deficits LOW - No comorbidities nor modifications of tasks    Clinical Decision Making LOW - Analysis of occupational profile, problem-focused assessments, limited treatment options    Overall Complexity LOW     OT Session Time: 25 minutes  Self-Care Home Management: 15 minutes

## 2024-11-12 NOTE — CM/SW NOTE
11/12/24 0900   CM/SW Referral Data   Referral Source Social Work (self-referral)   Reason for Referral Discharge planning   Informant EMR;Clinical Staff Member   Discharge Needs   Anticipated D/C needs Home health care       Patient is a 65 y/o woman admitted s/p TKR revision.  Pt with pre-operative plan for [facility].  PT recommending [facility] services at discharge.  Sandrine from [facility] confirmed pt accepted for [facility] services at [facility].  No other [facility] needs/concerns identified at this time.  / to remain available for support and/or discharge planning.     Liss Rosales, [facility]  Discharge Planner  [phone]

## 2024-11-12 NOTE — PHYSICAL THERAPY NOTE
PHYSICAL THERAPY JOINT EVALUATION - INPATIENT     Room Number: 352/352-A  Evaluation Date: 11/12/2024  Type of Evaluation: Initial  Physician Order: PT Eval and Treat    Presenting Problem: revision R TKA  Co-Morbidities : HLD, s/p L TKA 2013, anxiety, bipolar, meniere's, DM2, cerebellar ectopia, atherosclerosis, chronic diastolic CHF, emphysema, obesity, atrial flutter, ectatic aorta, BBPV  Reason for Therapy: Mobility Dysfunction and Discharge Planning    Procedure performed 11/11/24 Dr Burgess: Revision Right total knee arthroplasty    PHYSICAL THERAPY ASSESSMENT   Patient is a 64 year old female admitted 11/11/2024 for revision R TKAA.  Prior to admission, patient's baseline is John with RW.  Patient is currently functioning below baseline with bed mobility, transfers, gait, stair negotiation, maintaining seated position, and standing prolonged periods.  Patient is requiring contact guard assist and minimal assist as a result of the following impairments: decreased functional strength, decreased endurance/aerobic capacity, pain, impaired static and dynamic standing balance, impaired coordination, impaired motor planning, decreased muscular endurance, and limited RLE ROM.  Physical Therapy will continue to follow for duration of hospitalization.    Patient will benefit from continued skilled PT Services at discharge to promote prior level of function and safety with additional support and return home with home health PT.    PLAN DURING HOSPITALIZATION  Nursing Mobility Recommendation : 1 Assist  PT Device Recommendation: Rolling walker  PT Treatment Plan: Bed mobility;Body mechanics;Coordination;Endurance;Energy conservation;Patient education;Family education;Gait training;Neuromuscular re-educate;Range of motion;Strengthening;Stoop training;Stair training;Transfer training;Balance training  Rehab Potential : Good  Frequency (Obs): Daily     CURRENT GOALS  Goal #1  Patient is able to demonstrate supine - sit EOB  @ level: supervision     Goal #2  Patient is able to demonstrate transfers Sit to/from Stand at assistance level: supervision   Goal #3    Patient is able to ambulate 150 feet with assistive device at assistance level: supervision   Goal #4    Patient will negotiate 4 stairs/one curb w/ assistive device and supervision   Goal #5    Patient verbalizes and/or demonstrates all precautions and safety concerns independently    Goal #6      Goal Comments: Goals established on 11/12/2024    PHYSICAL THERAPY MEDICAL/SOCIAL HISTORY  History related to current admission: Patient is a 64 year old female admitted on 11/11/2024 from home for planned R TKA revision.     HOME SITUATION  Type of Home: Other (Comment) (Double Wide Trailer)   Home Layout: One level  Stairs to Enter : 4  Railing: Yes        Lives With: Spouse  Drives: Yes  Patient Regularly Uses: Reading glasses;Rolling walker    Prior Level of [place]:   Per pt lives in double wide trailer with 4 steps to enter. Ambulates with RW at baseline. Spouse able to assist as needed. No hx of falls. Drives. Sleeps in adjustable bed.     SUBJECTIVE  \"I sound like a pig.\"    OBJECTIVE  Precautions: Bed/chair alarm  Fall Risk: Standard fall risk    WEIGHT BEARING RESTRICTION  R Lower Extremity: Weight Bearing as Tolerated    PAIN ASSESSMENT  Rating: Other (Comment) (12)  Location: right knee  Management Techniques: Activity promotion;Body mechanics;Repositioning    COGNITION  Overall Cognitive Status:  WFL - within functional limits    RANGE OF MOTION AND STRENGTH ASSESSMENT  Upper extremity ROM and strength are within functional limits   Lower extremity ROM is limited as expected s/p TKA, pt also in knee immobilizer x 1 week   Lower extremity strength is limited as expected s/p TKA    BALANCE  Static Sitting: Fair +  Dynamic Sitting: Fair  Static Standing: Fair -  Dynamic Standing: Poor +    ADDITIONAL TESTS                                    ACTIVITY TOLERANCE                          O2 WALK       NEUROLOGICAL FINDINGS                        AM-PAC '6-Clicks' INPATIENT SHORT FORM - BASIC MOBILITY  How much difficulty does the patient currently have...  Patient Difficulty: Turning over in bed (including adjusting bedclothes, sheets and blankets)?: A Little   Patient Difficulty: Sitting down on and standing up from a chair with arms (e.g., wheelchair, bedside commode, etc.): A Little   Patient Difficulty: Moving from lying on back to sitting on the side of the bed?: A Little   How much help from another person does the patient currently need...   Help from Another: Moving to and from a bed to a chair (including a wheelchair)?: A Little   Help from Another: Need to walk in hospital room?: A Little   Help from Another: Climbing 3-5 steps with a railing?: A Little       AM-PAC Score:  Raw Score: 18   Approx Degree of Impairment: 46.58%   Standardized Score (AM-PAC Scale): 43.63   CMS Modifier (G-Code): CK    FUNCTIONAL ABILITY STATUS  Gait Assessment   Functional Mobility/Gait Assessment  Gait Assistance: Contact guard assist  Distance (ft): 75, 75  Assistive Device: Rolling walker  Pattern: R Decreased stance time  Stairs: Stairs  How Many Stairs: 4  Device: 2 Rails  Assist: Minimal assist  Pattern: Ascend and Descend  Ascend and Descend : Step to    Skilled Therapy Provided  Educated on WBAT to RLE- per MD orders  Educated on use of knee immobilizer and importance of remaining in total knee extension  Educated on importance of continued out of bed mobility and ambulation with assistance from nursing staff and use of RW  Encouraged continued use of ice for pain and swelling management  TKA HEP provided to pt this date    Bed mobility> sit to stand to RW> ambulated 75 feet with RW> seated rest break due to feeling dizzy, BP WNL, pt reporting severe pain levels> ascended/descended 4 steps> ambulated 75 feet back to room with RW> upright in chair at end of session    Bed  Mobility:  Rolling: NT  Supine to sit: supervision with HOB elevated   Sit to supine: NT     Transfer Mobility:  Sit to stand: CGA to RW- vc for hand placement   Stand to sit: CGA  Gait = CGA to [place] with RW x 75 feet, 75 feet- decr RLE stance time, incr reliance BUE for offloading, step to pattern. Pt in knee immobilizer. Multiple standing rest breaks due to severe pain.     Therapist's Comments:   Patient presents sitting up in bed. Discussed role and goal of physical therapy POD1 TKA. Pt in agreement to session.    Exercise/Education Provided:  Bed mobility  Body mechanics  Energy conservation  Functional activity tolerated  Gait training  Posture  Transfer training    Patient End of Session: Up in chair;Needs met;Call light within reach;RN aware of session/findings;All patient questions and concerns addressed;Hospital anti-slip socks;Alarm set;SCDs in place;Discussed recommendations with /    Patient Evaluation Complexity Level:  History Moderate - 1 or 2 personal factors and/or co-morbidities   Examination of body systems Low -  addressing 1-2 elements   Clinical Presentation Low- Stable   Clinical Decision Making Low Complexity     PT Session Time: 30 minutes  Gait Training: 15 minutes

## 2024-11-12 NOTE — PLAN OF CARE
NURSING ADMISSION NOTE      Patient admitted via  bed.  Oriented to room.  Safety precautions initiated.  Bed in low position.  Call light in reach. Pt received from PACU awake, A/Ox4. VSS. Rt leg with knee immobilizer. Pt rates pain to surg site10/10, pain med given. Denies n/t to ble, able to move toes, can dorsi/plantar both ankles. AW dsg intact, hemovac drain with mod amt of SS drainage. Pt updated with plan of care, verbalized understanding.

## 2024-11-13 NOTE — PAYOR COMM NOTE
--------------  DISCHARGE REVIEW    Payor: HOWARD PATTON  Subscriber #:  [id number]  Authorization Number: [id number]    Admit date: 11/11/24  Admit time:   1:07 PM  Discharge Date: 11/12/2024  3:24 PM     Admitting Physician: Jass Burgess MD  Attending Physician:  No att. providers found  Primary Care Physician: Haydee English DO       Discharge Summary Notes    No notes of this type exist for this encounter.         REVIEWER COMMENTS

## 2025-02-01 ENCOUNTER — APPOINTMENT (OUTPATIENT)
Dept: CT IMAGING | Age: 65
End: 2025-02-01

## 2025-02-01 ENCOUNTER — HOSPITAL ENCOUNTER (OUTPATIENT)
Age: 65
Setting detail: OBSERVATION
Discharge: HOME | End: 2025-02-02
Admitting: STUDENT IN AN ORGANIZED HEALTH CARE EDUCATION/TRAINING PROGRAM

## 2025-02-01 DIAGNOSIS — E87.6 HYPOKALEMIA: Primary | ICD-10-CM

## 2025-02-01 DIAGNOSIS — R42 DIZZINESS: ICD-10-CM

## 2025-02-01 PROBLEM — E11.9 T2DM (TYPE 2 DIABETES MELLITUS)  (CMD): Status: ACTIVE | Noted: 2025-02-01

## 2025-02-01 LAB
ALBUMIN SERPL-MCNC: 3.3 G/DL (ref 3.4–5)
ALBUMIN/GLOB SERPL: 0.8 {RATIO} (ref 1–2.4)
ALP SERPL-CCNC: 170 UNITS/L (ref 45–117)
ALT SERPL-CCNC: 13 UNITS/L
ANION GAP SERPL CALC-SCNC: 10 MMOL/L (ref 7–19)
AST SERPL-CCNC: 8 UNITS/L
ATRIAL RATE (BPM): 83
BASOPHILS # BLD: 0.1 K/MCL (ref 0–0.3)
BASOPHILS NFR BLD: 1 %
BILIRUB SERPL-MCNC: 0.2 MG/DL (ref 0.2–1)
BUN SERPL-MCNC: 8 MG/DL (ref 6–20)
BUN/CREAT SERPL: 7 (ref 7–25)
CALCIUM SERPL-MCNC: 8.5 MG/DL (ref 8.4–10.2)
CHLORIDE SERPL-SCNC: 100 MMOL/L (ref 97–110)
CO2 SERPL-SCNC: 32 MMOL/L (ref 21–32)
CREAT SERPL-MCNC: 1.09 MG/DL (ref 0.51–0.95)
DEPRECATED RDW RBC: 46.4 FL (ref 39–50)
EGFRCR SERPLBLD CKD-EPI 2021: 56 ML/MIN/{1.73_M2}
EOSINOPHIL # BLD: 0 K/MCL (ref 0–0.5)
EOSINOPHIL NFR BLD: 0 %
ERYTHROCYTE [DISTWIDTH] IN BLOOD: 15.4 % (ref 11–15)
FASTING DURATION TIME PATIENT: ABNORMAL H
GLOBULIN SER-MCNC: 4 G/DL (ref 2–4)
GLUCOSE BLDC GLUCOMTR-MCNC: 141 MG/DL (ref 70–99)
GLUCOSE BLDC GLUCOMTR-MCNC: 96 MG/DL (ref 70–99)
GLUCOSE SERPL-MCNC: 218 MG/DL (ref 70–99)
HCT VFR BLD CALC: 37.1 % (ref 36–46.5)
HGB BLD-MCNC: 11.9 G/DL (ref 12–15.5)
IMM GRANULOCYTES # BLD AUTO: 0.1 K/MCL (ref 0–0.2)
IMM GRANULOCYTES # BLD: 1 %
LYMPHOCYTES # BLD: 2.1 K/MCL (ref 1–4)
LYMPHOCYTES NFR BLD: 20 %
MAGNESIUM SERPL-MCNC: 2 MG/DL (ref 1.7–2.4)
MCH RBC QN AUTO: 26.4 PG (ref 26–34)
MCHC RBC AUTO-ENTMCNC: 32.1 G/DL (ref 32–36.5)
MCV RBC AUTO: 82.4 FL (ref 78–100)
MONOCYTES # BLD: 0.5 K/MCL (ref 0.3–0.9)
MONOCYTES NFR BLD: 5 %
NEUTROPHILS # BLD: 7.4 K/MCL (ref 1.8–7.7)
NEUTROPHILS NFR BLD: 73 %
NRBC BLD MANUAL-RTO: 0 /100 WBC
P AXIS (DEGREES): 0
PLATELET # BLD AUTO: 270 K/MCL (ref 140–450)
POTASSIUM SERPL-SCNC: 3 MMOL/L (ref 3.4–5.1)
POTASSIUM SERPL-SCNC: 3.3 MMOL/L (ref 3.4–5.1)
PR-INTERVAL (MSEC): 196
PROT SERPL-MCNC: 7.3 G/DL (ref 6.4–8.2)
QRS-INTERVAL (MSEC): 132
QT-INTERVAL (MSEC): 470
QTC: 552
R AXIS (DEGREES): 87
RBC # BLD: 4.5 MIL/MCL (ref 4–5.2)
REPORT TEXT: NORMAL
SODIUM SERPL-SCNC: 139 MMOL/L (ref 135–145)
T AXIS (DEGREES): 60
TROPONIN I SERPL DL<=0.01 NG/ML-MCNC: 10 NG/L
VENTRICULAR RATE EKG/MIN (BPM): 83
WBC # BLD: 10.1 K/MCL (ref 4.2–11)

## 2025-02-01 PROCEDURE — G0378 HOSPITAL OBSERVATION PER HR: HCPCS

## 2025-02-01 PROCEDURE — 99497 ADVNCD CARE PLAN 30 MIN: CPT | Performed by: STUDENT IN AN ORGANIZED HEALTH CARE EDUCATION/TRAINING PROGRAM

## 2025-02-01 PROCEDURE — 10002801 HB RX 250 W/O HCPCS: Performed by: STUDENT IN AN ORGANIZED HEALTH CARE EDUCATION/TRAINING PROGRAM

## 2025-02-01 PROCEDURE — 10002807 HB RX 258

## 2025-02-01 PROCEDURE — 85025 COMPLETE CBC W/AUTO DIFF WBC: CPT

## 2025-02-01 PROCEDURE — 82962 GLUCOSE BLOOD TEST: CPT

## 2025-02-01 PROCEDURE — 93005 ELECTROCARDIOGRAM TRACING: CPT

## 2025-02-01 PROCEDURE — 96361 HYDRATE IV INFUSION ADD-ON: CPT

## 2025-02-01 PROCEDURE — 83735 ASSAY OF MAGNESIUM: CPT

## 2025-02-01 PROCEDURE — 93010 ELECTROCARDIOGRAM REPORT: CPT | Performed by: INTERNAL MEDICINE

## 2025-02-01 PROCEDURE — 80053 COMPREHEN METABOLIC PANEL: CPT

## 2025-02-01 PROCEDURE — 84484 ASSAY OF TROPONIN QUANT: CPT

## 2025-02-01 PROCEDURE — 84132 ASSAY OF SERUM POTASSIUM: CPT | Performed by: STUDENT IN AN ORGANIZED HEALTH CARE EDUCATION/TRAINING PROGRAM

## 2025-02-01 PROCEDURE — 96360 HYDRATION IV INFUSION INIT: CPT

## 2025-02-01 PROCEDURE — 10002803 HB RX 637: Performed by: STUDENT IN AN ORGANIZED HEALTH CARE EDUCATION/TRAINING PROGRAM

## 2025-02-01 PROCEDURE — 10002803 HB RX 637

## 2025-02-01 PROCEDURE — 36415 COLL VENOUS BLD VENIPUNCTURE: CPT | Performed by: STUDENT IN AN ORGANIZED HEALTH CARE EDUCATION/TRAINING PROGRAM

## 2025-02-01 PROCEDURE — 10004651 HB RX, NO CHARGE ITEM: Performed by: STUDENT IN AN ORGANIZED HEALTH CARE EDUCATION/TRAINING PROGRAM

## 2025-02-01 PROCEDURE — 99285 EMERGENCY DEPT VISIT HI MDM: CPT

## 2025-02-01 PROCEDURE — 99223 1ST HOSP IP/OBS HIGH 75: CPT | Performed by: STUDENT IN AN ORGANIZED HEALTH CARE EDUCATION/TRAINING PROGRAM

## 2025-02-01 PROCEDURE — 70450 CT HEAD/BRAIN W/O DYE: CPT

## 2025-02-01 RX ORDER — POTASSIUM CHLORIDE 1500 MG/1
40 TABLET, EXTENDED RELEASE ORAL ONCE
Status: COMPLETED | OUTPATIENT
Start: 2025-02-02 | End: 2025-02-01

## 2025-02-01 RX ORDER — BISACODYL 10 MG
10 SUPPOSITORY, RECTAL RECTAL DAILY PRN
Status: DISCONTINUED | OUTPATIENT
Start: 2025-02-01 | End: 2025-02-02 | Stop reason: HOSPADM

## 2025-02-01 RX ORDER — TOPIRAMATE 25 MG/1
50 TABLET, FILM COATED ORAL DAILY
Status: DISCONTINUED | OUTPATIENT
Start: 2025-02-02 | End: 2025-02-02 | Stop reason: HOSPADM

## 2025-02-01 RX ORDER — ROSUVASTATIN CALCIUM 20 MG/1
40 TABLET, COATED ORAL NIGHTLY
Status: DISCONTINUED | OUTPATIENT
Start: 2025-02-01 | End: 2025-02-02 | Stop reason: HOSPADM

## 2025-02-01 RX ORDER — 0.9 % SODIUM CHLORIDE 0.9 %
10 VIAL (ML) INJECTION PRN
Status: DISCONTINUED | OUTPATIENT
Start: 2025-02-01 | End: 2025-02-02 | Stop reason: HOSPADM

## 2025-02-01 RX ORDER — LOSARTAN POTASSIUM 25 MG/1
25 TABLET ORAL DAILY
Status: DISCONTINUED | OUTPATIENT
Start: 2025-02-02 | End: 2025-02-02 | Stop reason: HOSPADM

## 2025-02-01 RX ORDER — POTASSIUM CHLORIDE 1.5 G/1.58G
40 POWDER, FOR SOLUTION ORAL ONCE
Status: DISCONTINUED | OUTPATIENT
Start: 2025-02-01 | End: 2025-02-01

## 2025-02-01 RX ORDER — DILTIAZEM HYDROCHLORIDE 180 MG/1
180 CAPSULE, EXTENDED RELEASE ORAL DAILY
Status: DISCONTINUED | OUTPATIENT
Start: 2025-02-02 | End: 2025-02-02 | Stop reason: HOSPADM

## 2025-02-01 RX ORDER — AMOXICILLIN 250 MG
2 CAPSULE ORAL 2 TIMES DAILY PRN
Status: DISCONTINUED | OUTPATIENT
Start: 2025-02-01 | End: 2025-02-02 | Stop reason: HOSPADM

## 2025-02-01 RX ORDER — ENOXAPARIN SODIUM 100 MG/ML
40 INJECTION SUBCUTANEOUS DAILY
Status: DISCONTINUED | OUTPATIENT
Start: 2025-02-02 | End: 2025-02-02 | Stop reason: HOSPADM

## 2025-02-01 RX ORDER — BENZONATATE 100 MG/1
100 CAPSULE ORAL 3 TIMES DAILY PRN
Status: DISCONTINUED | OUTPATIENT
Start: 2025-02-01 | End: 2025-02-02 | Stop reason: HOSPADM

## 2025-02-01 RX ORDER — MONTELUKAST SODIUM 10 MG/1
10 TABLET ORAL NIGHTLY
Status: DISCONTINUED | OUTPATIENT
Start: 2025-02-01 | End: 2025-02-02 | Stop reason: HOSPADM

## 2025-02-01 RX ORDER — ACETAMINOPHEN 650 MG/1
650 SUPPOSITORY RECTAL EVERY 4 HOURS PRN
Status: DISCONTINUED | OUTPATIENT
Start: 2025-02-01 | End: 2025-02-02 | Stop reason: HOSPADM

## 2025-02-01 RX ORDER — 0.9 % SODIUM CHLORIDE 0.9 %
2 VIAL (ML) INJECTION EVERY 12 HOURS SCHEDULED
Status: DISCONTINUED | OUTPATIENT
Start: 2025-02-01 | End: 2025-02-02 | Stop reason: HOSPADM

## 2025-02-01 RX ORDER — NICOTINE POLACRILEX 4 MG
15 LOZENGE BUCCAL PRN
Status: DISCONTINUED | OUTPATIENT
Start: 2025-02-01 | End: 2025-02-02 | Stop reason: HOSPADM

## 2025-02-01 RX ORDER — POTASSIUM CHLORIDE 1.5 G/1.58G
40 POWDER, FOR SOLUTION ORAL ONCE
Status: COMPLETED | OUTPATIENT
Start: 2025-02-01 | End: 2025-02-01

## 2025-02-01 RX ORDER — DIAZEPAM 5 MG/1
5 TABLET ORAL DAILY PRN
Status: DISCONTINUED | OUTPATIENT
Start: 2025-02-01 | End: 2025-02-02 | Stop reason: HOSPADM

## 2025-02-01 RX ORDER — DEXTROSE MONOHYDRATE 25 G/50ML
25 INJECTION, SOLUTION INTRAVENOUS PRN
Status: DISCONTINUED | OUTPATIENT
Start: 2025-02-01 | End: 2025-02-02 | Stop reason: HOSPADM

## 2025-02-01 RX ORDER — NICOTINE POLACRILEX 4 MG
30 LOZENGE BUCCAL PRN
Status: DISCONTINUED | OUTPATIENT
Start: 2025-02-01 | End: 2025-02-02 | Stop reason: HOSPADM

## 2025-02-01 RX ORDER — POLYETHYLENE GLYCOL 3350 17 G/17G
17 POWDER, FOR SOLUTION ORAL DAILY PRN
Status: DISCONTINUED | OUTPATIENT
Start: 2025-02-01 | End: 2025-02-02 | Stop reason: HOSPADM

## 2025-02-01 RX ORDER — DEXTROSE MONOHYDRATE 25 G/50ML
12.5 INJECTION, SOLUTION INTRAVENOUS PRN
Status: DISCONTINUED | OUTPATIENT
Start: 2025-02-01 | End: 2025-02-02 | Stop reason: HOSPADM

## 2025-02-01 RX ORDER — QUETIAPINE FUMARATE 25 MG/1
50 TABLET, FILM COATED ORAL AT BEDTIME
Status: DISCONTINUED | OUTPATIENT
Start: 2025-02-01 | End: 2025-02-02 | Stop reason: HOSPADM

## 2025-02-01 RX ORDER — LEVOTHYROXINE SODIUM 25 UG/1
25 TABLET ORAL DAILY
Status: DISCONTINUED | OUTPATIENT
Start: 2025-02-02 | End: 2025-02-02 | Stop reason: HOSPADM

## 2025-02-01 RX ORDER — ACETAMINOPHEN 325 MG/1
650 TABLET ORAL EVERY 4 HOURS PRN
Status: DISCONTINUED | OUTPATIENT
Start: 2025-02-01 | End: 2025-02-02 | Stop reason: HOSPADM

## 2025-02-01 RX ORDER — VENLAFAXINE HYDROCHLORIDE 75 MG/1
150 CAPSULE, EXTENDED RELEASE ORAL DAILY
Status: DISCONTINUED | OUTPATIENT
Start: 2025-02-02 | End: 2025-02-02 | Stop reason: HOSPADM

## 2025-02-01 RX ORDER — PANTOPRAZOLE SODIUM 40 MG/1
40 TABLET, DELAYED RELEASE ORAL
Status: DISCONTINUED | OUTPATIENT
Start: 2025-02-02 | End: 2025-02-02 | Stop reason: HOSPADM

## 2025-02-01 RX ORDER — LAMOTRIGINE 100 MG/1
100 TABLET ORAL DAILY
Status: DISCONTINUED | OUTPATIENT
Start: 2025-02-02 | End: 2025-02-02 | Stop reason: HOSPADM

## 2025-02-01 RX ADMIN — MONTELUKAST SODIUM 10 MG: 10 TABLET, FILM COATED ORAL at 21:54

## 2025-02-01 RX ADMIN — POTASSIUM CHLORIDE 40 MEQ: 1.5 POWDER, FOR SOLUTION ORAL at 16:11

## 2025-02-01 RX ADMIN — ROSUVASTATIN CALCIUM 40 MG: 20 TABLET, FILM COATED ORAL at 21:54

## 2025-02-01 RX ADMIN — SODIUM CHLORIDE 1000 ML: 9 INJECTION, SOLUTION INTRAVENOUS at 16:10

## 2025-02-01 RX ADMIN — BENZONATATE 100 MG: 100 CAPSULE ORAL at 21:54

## 2025-02-01 RX ADMIN — QUETIAPINE FUMARATE 50 MG: 25 TABLET, FILM COATED ORAL at 21:53

## 2025-02-01 RX ADMIN — POTASSIUM CHLORIDE 40 MEQ: 1500 TABLET, EXTENDED RELEASE ORAL at 21:53

## 2025-02-01 RX ADMIN — SODIUM CHLORIDE, PRESERVATIVE FREE 2 ML: 5 INJECTION INTRAVENOUS at 21:55

## 2025-02-01 SDOH — SOCIAL STABILITY: SOCIAL INSECURITY: HOW OFTEN DOES ANYONE, INCLUDING FAMILY AND FRIENDS, INSULT OR TALK DOWN TO YOU?: NEVER

## 2025-02-01 SDOH — ECONOMIC STABILITY: HOUSING INSECURITY: WHAT IS YOUR LIVING SITUATION TODAY?: I HAVE A STEADY PLACE TO LIVE

## 2025-02-01 SDOH — ECONOMIC STABILITY: HOUSING INSECURITY: WHAT IS YOUR LIVING SITUATION TODAY?: MOBILE HOME

## 2025-02-01 SDOH — HEALTH STABILITY: GENERAL: BECAUSE OF A PHYSICAL, MENTAL, OR EMOTIONAL CONDITION, DO YOU HAVE DIFFICULTY DOING ERRANDS ALONE?: NO

## 2025-02-01 SDOH — ECONOMIC STABILITY: HOUSING INSECURITY: DO YOU HAVE PROBLEMS WITH ANY OF THE FOLLOWING?: NONE OF THE ABOVE

## 2025-02-01 SDOH — SOCIAL STABILITY: SOCIAL INSECURITY: HOW OFTEN DOES ANYONE, INCLUDING FAMILY AND FRIENDS, SCREAM OR CURSE AT YOU?: NEVER

## 2025-02-01 SDOH — ECONOMIC STABILITY: FOOD INSECURITY: WITHIN THE PAST 12 MONTHS, THE FOOD YOU BOUGHT JUST DIDN'T LAST AND YOU DIDN'T HAVE MONEY TO GET MORE.: OFTEN TRUE

## 2025-02-01 SDOH — SOCIAL STABILITY: SOCIAL INSECURITY: HOW OFTEN DOES ANYONE, INCLUDING FAMILY AND FRIENDS, THREATEN YOU WITH HARM?: NEVER

## 2025-02-01 SDOH — ECONOMIC STABILITY: GENERAL

## 2025-02-01 SDOH — ECONOMIC STABILITY: INCOME INSECURITY: IN THE PAST 12 MONTHS, HAS THE ELECTRIC, GAS, OIL, OR WATER COMPANY THREATENED TO SHUT OFF SERVICE IN YOUR HOME?: NO

## 2025-02-01 SDOH — SOCIAL STABILITY: SOCIAL INSECURITY: HOW OFTEN DOES ANYONE, INCLUDING FAMILY AND FRIENDS, PHYSICALLY HURT YOU?: NEVER

## 2025-02-01 SDOH — SOCIAL STABILITY: SOCIAL NETWORK
HOW OFTEN DO YOU SEE OR TALK TO PEOPLE THAT YOU CARE ABOUT AND FEEL CLOSE TO? (FOR EXAMPLE: TALKING TO FRIENDS ON THE PHONE, VISITING FRIENDS OR FAMILY, GOING TO CHURCH OR CLUB MEETINGS): 5 OR MORE TIMES A WEEK

## 2025-02-01 SDOH — SOCIAL STABILITY: SOCIAL NETWORK: SUPPORT SYSTEMS: SPOUSE;CHILDREN;FAMILY MEMBERS;FRIENDS;NEIGHBORS

## 2025-02-01 SDOH — ECONOMIC STABILITY: GENERAL: WOULD YOU LIKE HELP WITH ANY OF THE FOLLOWING NEEDS?: I DON'T WANT HELP WITH ANY OF THESE

## 2025-02-01 SDOH — ECONOMIC STABILITY: HOUSING INSECURITY: WHAT IS YOUR LIVING SITUATION TODAY?: SPOUSE

## 2025-02-01 SDOH — HEALTH STABILITY: PHYSICAL HEALTH: DO YOU HAVE DIFFICULTY DRESSING OR BATHING?: NO

## 2025-02-01 SDOH — HEALTH STABILITY: PHYSICAL HEALTH: DO YOU HAVE SERIOUS DIFFICULTY WALKING OR CLIMBING STAIRS?: YES

## 2025-02-01 ASSESSMENT — ORIENTATION MEMORY CONCENTRATION TEST (OMCT)
OMCT INTERPRETATION: 0-6: NO SIGNIFICANT IMPAIRMENT
REPEAT THE NAME AND ADDRESS I ASKED YOU TO REMEMBER: CORRECT
OMCT SCORE: 2
SAY THE MONTHS IN REVERSE ORDER STARTING WITH LAST MONTH: 1 ERROR
COUNT BACKWARDS FROM 20 TO 1: CORRECT
WHAT YEAR IS IT NOW (MUST BE EXACT): CORRECT
WHAT MONTH IS IT NOW: CORRECT
WHAT TIME IS IT (NO WATCH OR CLOCK): CORRECT

## 2025-02-01 ASSESSMENT — ACTIVITIES OF DAILY LIVING (ADL)
ADL_SCORE: 12
RECENT_DECLINE_ADL: YES, ACUTE ILLNESS WITHOUT THERAPY NEEDS
ADL_BEFORE_ADMISSION: INDEPENDENT
ADL_SHORT_OF_BREATH: NO

## 2025-02-01 ASSESSMENT — LIFESTYLE VARIABLES
AUDIT-C TOTAL SCORE: 0
HOW OFTEN DO YOU HAVE 6 OR MORE DRINKS ON ONE OCCASION: NEVER
HOW MANY STANDARD DRINKS CONTAINING ALCOHOL DO YOU HAVE ON A TYPICAL DAY: 0,1 OR 2
HOW OFTEN DO YOU HAVE A DRINK CONTAINING ALCOHOL: NEVER
ALCOHOL_USE_STATUS: NO OR LOW RISK WITH VALIDATED TOOL

## 2025-02-01 ASSESSMENT — PATIENT HEALTH QUESTIONNAIRE - PHQ9
CLINICAL INTERPRETATION OF PHQ2 SCORE: NO FURTHER SCREENING NEEDED
SUM OF ALL RESPONSES TO PHQ9 QUESTIONS 1 AND 2: 1
1. LITTLE INTEREST OR PLEASURE IN DOING THINGS: SEVERAL DAYS
SUM OF ALL RESPONSES TO PHQ9 QUESTIONS 1 AND 2: 1
2. FEELING DOWN, DEPRESSED OR HOPELESS: NOT AT ALL
IS PATIENT ABLE TO COMPLETE PHQ2 OR PHQ9: YES

## 2025-02-01 ASSESSMENT — COLUMBIA-SUICIDE SEVERITY RATING SCALE - C-SSRS
6. HAVE YOU EVER DONE ANYTHING, STARTED TO DO ANYTHING, OR PREPARED TO DO ANYTHING TO END YOUR LIFE?: NO
IS THE PATIENT ABLE TO COMPLETE C-SSRS: YES
2. HAVE YOU ACTUALLY HAD ANY THOUGHTS OF KILLING YOURSELF?: NO

## 2025-02-01 ASSESSMENT — PAIN SCALES - GENERAL
PAINLEVEL_OUTOF10: 0
PAINLEVEL_OUTOF10: 0

## 2025-02-02 VITALS
WEIGHT: 189 LBS | BODY MASS INDEX: 33.49 KG/M2 | HEIGHT: 63 IN | SYSTOLIC BLOOD PRESSURE: 108 MMHG | TEMPERATURE: 98.2 F | RESPIRATION RATE: 18 BRPM | DIASTOLIC BLOOD PRESSURE: 67 MMHG | OXYGEN SATURATION: 93 % | HEART RATE: 80 BPM

## 2025-02-02 LAB
ALBUMIN SERPL-MCNC: 2.7 G/DL (ref 3.4–5)
ALBUMIN/GLOB SERPL: 0.8 {RATIO} (ref 1–2.4)
ALP SERPL-CCNC: 146 UNITS/L (ref 45–117)
ALT SERPL-CCNC: 11 UNITS/L
ANION GAP SERPL CALC-SCNC: 10 MMOL/L (ref 7–19)
AST SERPL-CCNC: 13 UNITS/L
BASOPHILS # BLD: 0.1 K/MCL (ref 0–0.3)
BASOPHILS NFR BLD: 1 %
BILIRUB SERPL-MCNC: 0.2 MG/DL (ref 0.2–1)
BUN SERPL-MCNC: 6 MG/DL (ref 6–20)
BUN/CREAT SERPL: 6 (ref 7–25)
CALCIUM SERPL-MCNC: 8.3 MG/DL (ref 8.4–10.2)
CHLORIDE SERPL-SCNC: 105 MMOL/L (ref 97–110)
CO2 SERPL-SCNC: 30 MMOL/L (ref 21–32)
CREAT SERPL-MCNC: 1 MG/DL (ref 0.51–0.95)
DEPRECATED RDW RBC: 46.7 FL (ref 39–50)
EGFRCR SERPLBLD CKD-EPI 2021: 62 ML/MIN/{1.73_M2}
EOSINOPHIL # BLD: 0 K/MCL (ref 0–0.5)
EOSINOPHIL NFR BLD: 0 %
ERYTHROCYTE [DISTWIDTH] IN BLOOD: 15.5 % (ref 11–15)
FASTING DURATION TIME PATIENT: ABNORMAL H
GLOBULIN SER-MCNC: 3.5 G/DL (ref 2–4)
GLUCOSE BLDC GLUCOMTR-MCNC: 120 MG/DL (ref 70–99)
GLUCOSE SERPL-MCNC: 104 MG/DL (ref 70–99)
HCT VFR BLD CALC: 34.2 % (ref 36–46.5)
HGB BLD-MCNC: 10.9 G/DL (ref 12–15.5)
IMM GRANULOCYTES # BLD AUTO: 0.1 K/MCL (ref 0–0.2)
IMM GRANULOCYTES # BLD: 1 %
LYMPHOCYTES # BLD: 2.5 K/MCL (ref 1–4)
LYMPHOCYTES NFR BLD: 25 %
MCH RBC QN AUTO: 26.4 PG (ref 26–34)
MCHC RBC AUTO-ENTMCNC: 31.9 G/DL (ref 32–36.5)
MCV RBC AUTO: 82.8 FL (ref 78–100)
MONOCYTES # BLD: 0.6 K/MCL (ref 0.3–0.9)
MONOCYTES NFR BLD: 6 %
NEUTROPHILS # BLD: 6.5 K/MCL (ref 1.8–7.7)
NEUTROPHILS NFR BLD: 67 %
NRBC BLD MANUAL-RTO: 0 /100 WBC
PLATELET # BLD AUTO: 244 K/MCL (ref 140–450)
POTASSIUM SERPL-SCNC: 3.5 MMOL/L (ref 3.4–5.1)
POTASSIUM SERPL-SCNC: 3.7 MMOL/L (ref 3.4–5.1)
PROT SERPL-MCNC: 6.2 G/DL (ref 6.4–8.2)
RBC # BLD: 4.13 MIL/MCL (ref 4–5.2)
SODIUM SERPL-SCNC: 141 MMOL/L (ref 135–145)
WBC # BLD: 9.8 K/MCL (ref 4.2–11)

## 2025-02-02 PROCEDURE — 84132 ASSAY OF SERUM POTASSIUM: CPT | Performed by: STUDENT IN AN ORGANIZED HEALTH CARE EDUCATION/TRAINING PROGRAM

## 2025-02-02 PROCEDURE — 85025 COMPLETE CBC W/AUTO DIFF WBC: CPT | Performed by: STUDENT IN AN ORGANIZED HEALTH CARE EDUCATION/TRAINING PROGRAM

## 2025-02-02 PROCEDURE — 10002801 HB RX 250 W/O HCPCS: Performed by: STUDENT IN AN ORGANIZED HEALTH CARE EDUCATION/TRAINING PROGRAM

## 2025-02-02 PROCEDURE — 10002803 HB RX 637: Performed by: STUDENT IN AN ORGANIZED HEALTH CARE EDUCATION/TRAINING PROGRAM

## 2025-02-02 PROCEDURE — 36415 COLL VENOUS BLD VENIPUNCTURE: CPT | Performed by: STUDENT IN AN ORGANIZED HEALTH CARE EDUCATION/TRAINING PROGRAM

## 2025-02-02 PROCEDURE — 99239 HOSP IP/OBS DSCHRG MGMT >30: CPT | Performed by: STUDENT IN AN ORGANIZED HEALTH CARE EDUCATION/TRAINING PROGRAM

## 2025-02-02 PROCEDURE — 96372 THER/PROPH/DIAG INJ SC/IM: CPT | Performed by: STUDENT IN AN ORGANIZED HEALTH CARE EDUCATION/TRAINING PROGRAM

## 2025-02-02 PROCEDURE — 82962 GLUCOSE BLOOD TEST: CPT

## 2025-02-02 PROCEDURE — G0378 HOSPITAL OBSERVATION PER HR: HCPCS

## 2025-02-02 PROCEDURE — 80053 COMPREHEN METABOLIC PANEL: CPT | Performed by: STUDENT IN AN ORGANIZED HEALTH CARE EDUCATION/TRAINING PROGRAM

## 2025-02-02 PROCEDURE — 10002800 HB RX 250 W HCPCS: Performed by: STUDENT IN AN ORGANIZED HEALTH CARE EDUCATION/TRAINING PROGRAM

## 2025-02-02 RX ORDER — POTASSIUM CHLORIDE 1500 MG/1
40 TABLET, EXTENDED RELEASE ORAL ONCE
Status: COMPLETED | OUTPATIENT
Start: 2025-02-02 | End: 2025-02-02

## 2025-02-02 RX ORDER — BENZONATATE 100 MG/1
100 CAPSULE ORAL 3 TIMES DAILY PRN
Qty: 15 CAPSULE | Refills: 0 | Status: SHIPPED | OUTPATIENT
Start: 2025-02-02

## 2025-02-02 RX ORDER — GALCANEZUMAB 120 MG/ML
120 INJECTION, SOLUTION SUBCUTANEOUS
COMMUNITY

## 2025-02-02 RX ADMIN — LOSARTAN POTASSIUM 25 MG: 25 TABLET, FILM COATED ORAL at 09:11

## 2025-02-02 RX ADMIN — VENLAFAXINE HYDROCHLORIDE 150 MG: 75 CAPSULE, EXTENDED RELEASE ORAL at 09:11

## 2025-02-02 RX ADMIN — TOPIRAMATE 50 MG: 25 TABLET ORAL at 09:11

## 2025-02-02 RX ADMIN — PANTOPRAZOLE SODIUM 40 MG: 40 TABLET, DELAYED RELEASE ORAL at 05:46

## 2025-02-02 RX ADMIN — BENZONATATE 100 MG: 100 CAPSULE ORAL at 09:11

## 2025-02-02 RX ADMIN — DILTIAZEM HYDROCHLORIDE 180 MG: 180 CAPSULE, EXTENDED RELEASE ORAL at 09:11

## 2025-02-02 RX ADMIN — LEVOTHYROXINE SODIUM 25 MCG: 0.03 TABLET ORAL at 08:00

## 2025-02-02 RX ADMIN — ENOXAPARIN SODIUM 40 MG: 40 INJECTION SUBCUTANEOUS at 09:14

## 2025-02-02 RX ADMIN — LAMOTRIGINE 100 MG: 100 TABLET ORAL at 09:11

## 2025-02-02 RX ADMIN — POTASSIUM CHLORIDE 40 MEQ: 1500 TABLET, EXTENDED RELEASE ORAL at 09:11

## 2025-02-02 ASSESSMENT — PAIN SCALES - GENERAL: PAINLEVEL_OUTOF10: 0

## 2025-02-03 LAB
ATRIAL RATE (BPM): 83
P AXIS (DEGREES): 0
PR-INTERVAL (MSEC): 196
QRS-INTERVAL (MSEC): 132
QT-INTERVAL (MSEC): 470
QTC: 552
R AXIS (DEGREES): 87
REPORT TEXT: NORMAL
T AXIS (DEGREES): 60
VENTRICULAR RATE EKG/MIN (BPM): 83

## 2025-04-16 NOTE — DISCHARGE INSTRUCTIONS
Home Care Instructions Following Your Shoulder Arthroscopy       Weight bearing status  You will be non-weight bearing on the operative extremity for the next 4 - 6 weeks.  This means do not carry/hold anything in your operative hand for 4 - 6 weeks.    Sling instructions  Remain in your sling at all times for the next 4 - 6 weeks.  You may remove your sling for showering and physical therapy only.  Keep your arm down at your side when showering.   You must sleep in your sling.     Bandages (Dressing)  Keep dressing clean and dry.  You may remove your dressing 5 days after surgery. You will notice small black sutures over your incisions. Place simple bandaids over each incision until you first post-operative appointment.  You can shower after removing your dressings 5 days after surgery.    Wound Care  The following instructions will promote proper healing and help to prevent infection.  Our office staff will remove your sutures at your first post-operative appointment:   You can shower after you remove you dressings 5 days following surgery.  Do not scrub your incisions.  Pat dry after your shower and cover with band-aids.  No submerging incisions under water for at least 6 weeks after surgery.    Cold Therapy  Cold therapy will help minimize swelling, improve, comfort, and limit the need for pain medication.  Apply ice 2-3 times daily x2 weeks as needed.  The ice bag should never come in direct contact with the skin, place a towel between the ice bag and the skin.  The ice should be applied 20 minutes on and 20 minutes off to prevent damage to the skin.  Immediately contact Dr. George if you experience excruciating pain not helped by pain medication, burning, blisters, redness, discoloration, or other changes in skin  appearance    Pain Medication: Percocet  Norco: Take one or two tablets every six hours as needed for pain.  Do not exceed 10 (ten) tablets each day.  Do not take Percocet if you do not have  pain.    Home Medication  Resume your home medications as instructed.    Diet  Resume your normal diet.    Activity  No strenuous activity. Remain non-weight bearing for the operative upper extremity for 4-6 weeks.   Remain in your sling at all times. Remove sling only for showering and physical therapy.  You can go up and down the stairs as tolerated. Use common sense.  You cannot return to work if your work requires strenuous activity with your hands.  You cannot return to any sports or exercises involving your arms/hands until Dr. George gives you permission.    Driving  Do not drive if you are taking pain medication.    Follow-up Appointment with Dr. George  You were likely given a postoperative appointment with Dr. George at the time your operation was scheduled.  Call Dr. George's office today for an appointment. You should be seen 2 weeks after your surgery. Call [phone] to schedule your appointment.  Verify your appointment date, day, time, and location.  At your 1st postoperative office visit: Your wounds will be evaluated, sutures removed, healing assessed, physical therapy will be ordered, and any additional concerns and instructions will be discussed.    Questions or Concerns  Call Dr. George's office if you experience severe pain not controlled by pain medication, swelling, numbness, tingling, bleeding, fever, or other concerns. If your call is made after office hours, a physician's assistant or nurse practitioner will be available to help you. There is always a surgeon covering Dr. George's patients, if he is unavailable.       HOME INSTRUCTIONS  AMBSURG HOME CARE INSTRUCTIONS: POST-OP ANESTHESIA  The medication that you received for sedation or general anesthesia can last up to 24 hours. Your judgment and reflexes may be altered, even if you feel like your normal self.      We Recommend:   Do not drive any motor vehicle or bicycle   Avoid mowing the lawn, playing sports, or working with power  tools/applicances (power saws, electric knives or mixers)   That you have someone stay with you on your first night home   Do not drink alcohol or take sleeping pills or tranquilizers   Do not sign legal documents within 24 hours of your procedure   If you had a nerve block for your surgery, take extra care not to put any pressure on your arm or hand for 24 hours    It is normal:  For you to have a sore throat if you had a breathing tube during surgery (while you were asleep!). The sore throat should get better within 48 hours. You can gargle with warm salt water (1/2 tsp in 4 oz warm water) or use a throat lozenge for comfort  To feel muscle aches or soreness especially in the abdomen, chest or neck. The achy feeling should go away in the next 24 hours  To feel weak, sleepy or \"wiped out\". Your should start feeling better in the next 24 hours.   To experience mild discomforts such as sore lip or tongue, headache, cramps, gas pains or a bloated feeling in your abdomen.   To experience mild back pain or soreness for a day or two if you had spinal or epidural anesthesia.   If you had laparoscopic surgery, to feel shoulder pain or discomfort on the day of surgery.   For some patients to have nausea after surgery/anesthesia    If you feel nausea or experience vomiting:   Try to move around less.   Eat less than usual or drink only liquids until the next morning   Nausea should resolve in about 24 hours    If you have a problem when you are at home:    Call your surgeons office   Discharge Instructions: After Your Surgery  You’ve just had surgery. During surgery, you were given medicine called anesthesia to keep you relaxed and free of pain. After surgery, you may have some pain or nausea. This is common. Here are some tips for feeling better and getting well after surgery.   Going home  Your healthcare provider will show you how to take care of yourself when you go home. They'll also answer your questions. Have an adult  family member or friend drive you home. For the first 24 hours after your surgery:   Don't drive or use heavy equipment.  Don't make important decisions or sign legal papers.  Take medicines as directed.  Don't drink alcohol.  Have someone stay with you, if needed. They can watch for problems and help keep you safe.  Be sure to go to all follow-up visits with your healthcare provider. And rest after your surgery for as long as your provider tells you to.   Coping with pain  If you have pain after surgery, pain medicine will help you feel better. Take it as directed, before pain becomes severe. Also, ask your healthcare provider or pharmacist about other ways to control pain. This might be with heat, ice, or relaxation. And follow any other instructions your surgeon or nurse gives you.      Stay on schedule with your medicine.     Tips for taking pain medicine  To get the best relief possible, remember these points:   Pain medicines can upset your stomach. Taking them with a little food may help.  Most pain relievers taken by mouth need at least 20 to 30 minutes to start to work.  Don't wait till your pain becomes severe before you take your medicine. Try to time your medicine so that you can take it before starting an activity. This might be before you get dressed, go for a walk, or sit down for dinner.  Constipation is a common side effect of some pain medicines. Call your healthcare provider before taking any medicines such as laxatives or stool softeners to help ease constipation. Also ask if you should skip any foods. Drinking lots of fluids and eating foods such as fruits and vegetables that are high in fiber can also help. Remember, don't take laxatives unless your surgeon has prescribed them.  Drinking alcohol and taking pain medicine can cause dizziness and slow your breathing. It can even be deadly. Don't drink alcohol while taking pain medicine.  Pain medicine can make you react more slowly to things.  Don't drive or run machinery while taking pain medicine.  Your healthcare provider may tell you to take acetaminophen to help ease your pain. Ask them how much you're supposed to take each day. Acetaminophen or other pain relievers may interact with your prescription medicines or other over-the-counter (OTC) medicines. Some prescription medicines have acetaminophen and other ingredients in them. Using both prescription and OTC acetaminophen for pain can cause you to accidentally overdose. Read the labels on your OTC medicines with care. This will help you to clearly know the list of ingredients, how much to take, and any warnings. It may also help you not take too much acetaminophen. If you have questions or don't understand the information, ask your pharmacist or healthcare provider to explain it to you before you take the OTC medicine.   Managing nausea  Some people have an upset stomach (nausea) after surgery. This is often because of anesthesia, pain, or pain medicine, less movement of food in the stomach, or the stress of surgery. These tips will help you handle nausea and eat healthy foods as you get better. If you were on a special food plan before surgery, ask your healthcare provider if you should follow it while you get better. Check with your provider on how your eating should progress. It may depend on the surgery you had. These general tips may help:   Don't push yourself to eat. Your body will tell you when to eat and how much.  Start off with clear liquids and soup. They're easier to digest.  Next try semi-solid foods as you feel ready. These include mashed potatoes, applesauce, and gelatin.  Slowly move to solid foods. Don’t eat fatty, rich, or spicy foods at first.  Don't force yourself to have 3 large meals a day. Instead eat smaller amounts more often.  Take pain medicines with a small amount of solid food, such as crackers or toast. This helps prevent nausea.  When to call your healthcare  provider  Call your healthcare provider right away if you have any of these:   You still have too much pain, or the pain gets worse, after taking the medicine. The medicine may not be strong enough. Or there may be a complication from the surgery.  You feel too sleepy, dizzy, or groggy. The medicine may be too strong.  Side effects such as nausea or vomiting. Your healthcare provider may advise taking other medicines to .  Skin changes such as rash, itching, or hives. This may mean you have an allergic reaction. Your provider may advise taking other medicines.  The incision looks different (for instance, part of it opens up).  Bleeding or fluid leaking from the incision site, and weren't told to expect that.  Fever of 100.4°F (38°C) or higher, or as directed by your provider.  Call 911  Call 911 right away if you have:   Trouble breathing  Facial swelling    If you have obstructive sleep apnea   You were given anesthesia medicine during surgery to keep you comfortable and free of pain. After surgery, you may have more apnea spells because of this medicine and other medicines you were given. The spells may last longer than normal.    At home:  Keep using the continuous positive airway pressure (CPAP) device when you sleep. Unless your healthcare provider tells you not to, use it when you sleep, day or night. CPAP is a common device used to treat obstructive sleep apnea.  Talk with your provider before taking any pain medicine, muscle relaxants, or sedatives. Your provider will tell you about the possible dangers of taking these medicines.  Contact your provider if your sleeping changes a lot even when taking medicines as directed.  [employer] last reviewed this educational content on 10/1/2021  © 1914-9900 [employer]. All rights reserved. This information is not intended as a substitute for professional medical care. Always follow your healthcare professional's instructions.

## 2025-04-18 ENCOUNTER — ANESTHESIA (OUTPATIENT)
Dept: SURGERY | Facility: HOSPITAL | Age: 65
End: 2025-04-18
Payer: MEDICARE

## 2025-04-18 ENCOUNTER — HOSPITAL ENCOUNTER (OUTPATIENT)
Facility: HOSPITAL | Age: 65
Setting detail: HOSPITAL OUTPATIENT SURGERY
Discharge: HOME | End: 2025-04-18
Attending: ORTHOPAEDIC SURGERY | Admitting: ORTHOPAEDIC SURGERY
Payer: MEDICARE

## 2025-04-18 ENCOUNTER — ANESTHESIA EVENT (OUTPATIENT)
Dept: SURGERY | Facility: HOSPITAL | Age: 65
End: 2025-04-18
Payer: MEDICARE

## 2025-04-18 VITALS
BODY MASS INDEX: 34.37 KG/M2 | RESPIRATION RATE: 14 BRPM | SYSTOLIC BLOOD PRESSURE: 117 MMHG | HEART RATE: 80 BPM | DIASTOLIC BLOOD PRESSURE: 45 MMHG | TEMPERATURE: 98 F | WEIGHT: 194 LBS | OXYGEN SATURATION: 95 % | HEIGHT: 63 IN

## 2025-04-18 LAB
GLUCOSE BLDC GLUCOMTR-MCNC: 173 MG/DL (ref 70–99)
GLUCOSE BLDC GLUCOMTR-MCNC: 204 MG/DL (ref 70–99)

## 2025-04-18 PROCEDURE — 64415 NJX AA&/STRD BRCH PLXS IMG: CPT | Performed by: ORTHOPAEDIC SURGERY

## 2025-04-18 PROCEDURE — 82962 GLUCOSE BLOOD TEST: CPT

## 2025-04-18 DEVICE — CORKSCREW FT, PEEK, SUTURETAPE, 4.75M
Type: IMPLANTABLE DEVICE | Site: SHOULDER | Status: FUNCTIONAL
Brand: ARTHREX®

## 2025-04-18 DEVICE — SWIVELOCK, SP PEEK 4.75MM
Type: IMPLANTABLE DEVICE | Site: SHOULDER | Status: FUNCTIONAL
Brand: ARTHREX®

## 2025-04-18 DEVICE — PEEK SWIVELOCK, 3.9X17.9MM
Type: IMPLANTABLE DEVICE | Site: SHOULDER | Status: FUNCTIONAL
Brand: ARTHREX®

## 2025-04-18 RX ORDER — NALOXONE HYDROCHLORIDE 0.4 MG/ML
80 INJECTION, SOLUTION INTRAMUSCULAR; INTRAVENOUS; SUBCUTANEOUS AS NEEDED
Status: DISCONTINUED | OUTPATIENT
Start: 2025-04-18 | End: 2025-04-18

## 2025-04-18 RX ORDER — PHENYLEPHRINE HCL 10 MG/ML
VIAL (ML) INJECTION AS NEEDED
Status: DISCONTINUED | OUTPATIENT
Start: 2025-04-18 | End: 2025-04-18 | Stop reason: SURG

## 2025-04-18 RX ORDER — SODIUM CHLORIDE 9 MG/ML
INJECTION, SOLUTION INTRAVENOUS CONTINUOUS
Status: DISCONTINUED | OUTPATIENT
Start: 2025-04-18 | End: 2025-04-18

## 2025-04-18 RX ORDER — NICOTINE POLACRILEX 4 MG
30 LOZENGE BUCCAL
Status: DISCONTINUED | OUTPATIENT
Start: 2025-04-18 | End: 2025-04-18

## 2025-04-18 RX ORDER — MORPHINE SULFATE 4 MG/ML
2 INJECTION, SOLUTION INTRAMUSCULAR; INTRAVENOUS EVERY 10 MIN PRN
Status: DISCONTINUED | OUTPATIENT
Start: 2025-04-18 | End: 2025-04-18

## 2025-04-18 RX ORDER — DEXAMETHASONE SODIUM PHOSPHATE 10 MG/ML
INJECTION, SOLUTION INTRAMUSCULAR; INTRAVENOUS
Status: COMPLETED | OUTPATIENT
Start: 2025-04-18 | End: 2025-04-18

## 2025-04-18 RX ORDER — VANCOMYCIN HYDROCHLORIDE
15 ONCE
Status: DISCONTINUED | OUTPATIENT
Start: 2025-04-18 | End: 2025-04-18 | Stop reason: HOSPADM

## 2025-04-18 RX ORDER — MORPHINE SULFATE 15 MG/1
15 TABLET ORAL EVERY 4 HOURS PRN
Refills: 0 | OUTPATIENT
Start: 2025-04-18 | End: 2025-04-19

## 2025-04-18 RX ORDER — LIDOCAINE HYDROCHLORIDE 10 MG/ML
INJECTION, SOLUTION EPIDURAL; INFILTRATION; INTRACAUDAL; PERINEURAL AS NEEDED
Status: DISCONTINUED | OUTPATIENT
Start: 2025-04-18 | End: 2025-04-18 | Stop reason: SURG

## 2025-04-18 RX ORDER — MIDAZOLAM HYDROCHLORIDE 1 MG/ML
INJECTION INTRAMUSCULAR; INTRAVENOUS
Status: COMPLETED | OUTPATIENT
Start: 2025-04-18 | End: 2025-04-18

## 2025-04-18 RX ORDER — GLYCOPYRROLATE 0.2 MG/ML
INJECTION, SOLUTION INTRAMUSCULAR; INTRAVENOUS AS NEEDED
Status: DISCONTINUED | OUTPATIENT
Start: 2025-04-18 | End: 2025-04-18 | Stop reason: SURG

## 2025-04-18 RX ORDER — METOPROLOL TARTRATE 25 MG/1
25 TABLET, FILM COATED ORAL ONCE AS NEEDED
Status: DISCONTINUED | OUTPATIENT
Start: 2025-04-18 | End: 2025-04-18 | Stop reason: HOSPADM

## 2025-04-18 RX ORDER — FAMOTIDINE 20 MG/1
20 TABLET, FILM COATED ORAL ONCE
Status: COMPLETED | OUTPATIENT
Start: 2025-04-18 | End: 2025-04-18

## 2025-04-18 RX ORDER — MORPHINE SULFATE 4 MG/ML
4 INJECTION, SOLUTION INTRAMUSCULAR; INTRAVENOUS EVERY 2 HOUR PRN
OUTPATIENT
Start: 2025-04-18 | End: 2025-04-19

## 2025-04-18 RX ORDER — MORPHINE SULFATE 4 MG/ML
4 INJECTION, SOLUTION INTRAMUSCULAR; INTRAVENOUS EVERY 10 MIN PRN
Status: DISCONTINUED | OUTPATIENT
Start: 2025-04-18 | End: 2025-04-18

## 2025-04-18 RX ORDER — DEXAMETHASONE SODIUM PHOSPHATE 4 MG/ML
VIAL (ML) INJECTION AS NEEDED
Status: DISCONTINUED | OUTPATIENT
Start: 2025-04-18 | End: 2025-04-18 | Stop reason: SURG

## 2025-04-18 RX ORDER — ACETAMINOPHEN 500 MG
1000 TABLET ORAL EVERY 8 HOURS
OUTPATIENT
Start: 2025-04-18 | End: 2025-04-19

## 2025-04-18 RX ORDER — HYDROMORPHONE HYDROCHLORIDE 1 MG/ML
0.4 INJECTION, SOLUTION INTRAMUSCULAR; INTRAVENOUS; SUBCUTANEOUS EVERY 5 MIN PRN
Status: DISCONTINUED | OUTPATIENT
Start: 2025-04-18 | End: 2025-04-18

## 2025-04-18 RX ORDER — DEXTROSE MONOHYDRATE 25 G/50ML
50 INJECTION, SOLUTION INTRAVENOUS
Status: DISCONTINUED | OUTPATIENT
Start: 2025-04-18 | End: 2025-04-18

## 2025-04-18 RX ORDER — FAMOTIDINE 10 MG/ML
20 INJECTION, SOLUTION INTRAVENOUS ONCE
Status: COMPLETED | OUTPATIENT
Start: 2025-04-18 | End: 2025-04-18

## 2025-04-18 RX ORDER — MORPHINE SULFATE 4 MG/ML
2 INJECTION, SOLUTION INTRAMUSCULAR; INTRAVENOUS EVERY 2 HOUR PRN
OUTPATIENT
Start: 2025-04-18 | End: 2025-04-19

## 2025-04-18 RX ORDER — OXYCODONE HYDROCHLORIDE 5 MG/1
10 TABLET ORAL EVERY 4 HOURS PRN
Refills: 0 | OUTPATIENT
Start: 2025-04-18 | End: 2025-04-19

## 2025-04-18 RX ORDER — ROCURONIUM BROMIDE 10 MG/ML
INJECTION, SOLUTION INTRAVENOUS AS NEEDED
Status: DISCONTINUED | OUTPATIENT
Start: 2025-04-18 | End: 2025-04-18 | Stop reason: SURG

## 2025-04-18 RX ORDER — SODIUM CHLORIDE, SODIUM LACTATE, POTASSIUM CHLORIDE, CALCIUM CHLORIDE 600; 310; 30; 20 MG/100ML; MG/100ML; MG/100ML; MG/100ML
INJECTION, SOLUTION INTRAVENOUS CONTINUOUS
Status: DISCONTINUED | OUTPATIENT
Start: 2025-04-18 | End: 2025-04-18

## 2025-04-18 RX ORDER — NICOTINE POLACRILEX 4 MG
15 LOZENGE BUCCAL
Status: DISCONTINUED | OUTPATIENT
Start: 2025-04-18 | End: 2025-04-18

## 2025-04-18 RX ORDER — OXYCODONE HYDROCHLORIDE 5 MG/1
5 TABLET ORAL EVERY 4 HOURS PRN
Refills: 0 | OUTPATIENT
Start: 2025-04-18 | End: 2025-04-19

## 2025-04-18 RX ORDER — EPHEDRINE SULFATE 50 MG/ML
INJECTION, SOLUTION INTRAVENOUS AS NEEDED
Status: DISCONTINUED | OUTPATIENT
Start: 2025-04-18 | End: 2025-04-18 | Stop reason: SURG

## 2025-04-18 RX ORDER — MORPHINE SULFATE 10 MG/ML
6 INJECTION, SOLUTION INTRAMUSCULAR; INTRAVENOUS EVERY 10 MIN PRN
Status: DISCONTINUED | OUTPATIENT
Start: 2025-04-18 | End: 2025-04-18

## 2025-04-18 RX ORDER — ONDANSETRON 2 MG/ML
INJECTION INTRAMUSCULAR; INTRAVENOUS AS NEEDED
Status: DISCONTINUED | OUTPATIENT
Start: 2025-04-18 | End: 2025-04-18 | Stop reason: SURG

## 2025-04-18 RX ORDER — ROPIVACAINE HYDROCHLORIDE 5 MG/ML
INJECTION, SOLUTION EPIDURAL; INFILTRATION; PERINEURAL
Status: COMPLETED | OUTPATIENT
Start: 2025-04-18 | End: 2025-04-18

## 2025-04-18 RX ORDER — LIDOCAINE HYDROCHLORIDE 10 MG/ML
INJECTION, SOLUTION INFILTRATION; PERINEURAL
Status: COMPLETED | OUTPATIENT
Start: 2025-04-18 | End: 2025-04-18

## 2025-04-18 RX ORDER — MORPHINE SULFATE 4 MG/ML
6 INJECTION, SOLUTION INTRAMUSCULAR; INTRAVENOUS EVERY 2 HOUR PRN
OUTPATIENT
Start: 2025-04-18 | End: 2025-04-19

## 2025-04-18 RX ORDER — ACETAMINOPHEN 500 MG
1000 TABLET ORAL ONCE
Status: COMPLETED | OUTPATIENT
Start: 2025-04-18 | End: 2025-04-18

## 2025-04-18 RX ORDER — HYDROMORPHONE HYDROCHLORIDE 1 MG/ML
0.6 INJECTION, SOLUTION INTRAMUSCULAR; INTRAVENOUS; SUBCUTANEOUS EVERY 5 MIN PRN
Status: DISCONTINUED | OUTPATIENT
Start: 2025-04-18 | End: 2025-04-18

## 2025-04-18 RX ORDER — HYDROMORPHONE HYDROCHLORIDE 1 MG/ML
0.2 INJECTION, SOLUTION INTRAMUSCULAR; INTRAVENOUS; SUBCUTANEOUS EVERY 5 MIN PRN
Status: DISCONTINUED | OUTPATIENT
Start: 2025-04-18 | End: 2025-04-18

## 2025-04-18 RX ORDER — ASPIRIN 81 MG/1
81 TABLET ORAL ONCE
COMMUNITY

## 2025-04-18 RX ADMIN — EPHEDRINE SULFATE 5 MG: 50 INJECTION, SOLUTION INTRAVENOUS at 08:42:00

## 2025-04-18 RX ADMIN — MIDAZOLAM HYDROCHLORIDE 2 MG: 1 INJECTION INTRAMUSCULAR; INTRAVENOUS at 07:24:00

## 2025-04-18 RX ADMIN — PHENYLEPHRINE HCL 100 MCG: 10 MG/ML VIAL (ML) INJECTION at 08:54:00

## 2025-04-18 RX ADMIN — LIDOCAINE HYDROCHLORIDE 50 MG: 10 INJECTION, SOLUTION EPIDURAL; INFILTRATION; INTRACAUDAL; PERINEURAL at 07:49:00

## 2025-04-18 RX ADMIN — PHENYLEPHRINE HCL 100 MCG: 10 MG/ML VIAL (ML) INJECTION at 08:50:00

## 2025-04-18 RX ADMIN — EPHEDRINE SULFATE 5 MG: 50 INJECTION, SOLUTION INTRAVENOUS at 08:50:00

## 2025-04-18 RX ADMIN — DEXAMETHASONE SODIUM PHOSPHATE 10 MG: 10 INJECTION, SOLUTION INTRAMUSCULAR; INTRAVENOUS at 07:24:00

## 2025-04-18 RX ADMIN — ONDANSETRON 4 MG: 2 INJECTION INTRAMUSCULAR; INTRAVENOUS at 07:52:00

## 2025-04-18 RX ADMIN — SODIUM CHLORIDE, SODIUM LACTATE, POTASSIUM CHLORIDE, CALCIUM CHLORIDE: 600; 310; 30; 20 INJECTION, SOLUTION INTRAVENOUS at 07:44:00

## 2025-04-18 RX ADMIN — ROCURONIUM BROMIDE 50 MG: 10 INJECTION, SOLUTION INTRAVENOUS at 07:49:00

## 2025-04-18 RX ADMIN — EPHEDRINE SULFATE 10 MG: 50 INJECTION, SOLUTION INTRAVENOUS at 08:54:00

## 2025-04-18 RX ADMIN — DEXAMETHASONE SODIUM PHOSPHATE 4 MG: 4 MG/ML VIAL (ML) INJECTION at 07:52:00

## 2025-04-18 RX ADMIN — GLYCOPYRROLATE 0.2 MG: 0.2 INJECTION, SOLUTION INTRAMUSCULAR; INTRAVENOUS at 07:52:00

## 2025-04-18 RX ADMIN — ROPIVACAINE HYDROCHLORIDE 30 ML: 5 INJECTION, SOLUTION EPIDURAL; INFILTRATION; PERINEURAL at 07:24:00

## 2025-04-18 RX ADMIN — LIDOCAINE HYDROCHLORIDE 5 ML: 10 INJECTION, SOLUTION INFILTRATION; PERINEURAL at 07:24:00

## 2025-04-18 NOTE — ANESTHESIA PROCEDURE NOTES
Peripheral Block    Date/Time: 4/18/2025 7:24 AM    Performed by: Rajwinder Ramirez MD  Authorized by: Rajwinder Ramirez MD      General Information and Staff    Start Time:  4/18/2025 7:24 AM  End Time:  4/18/2025 7:30 AM  Anesthesiologist:  Rajwinder Ramirez MD  Performed by:  Anesthesiologist  Patient Location:  PACU      Site Identification: real time ultrasound guided, static ultrasound guided, nerve stimulator, surface landmarks and image stored and retrievable    Block site/laterality marked before start: site marked  Reason for Block: at surgeon's request and post-op pain management    Preanesthetic Checklist: 2 patient identifers, IV checked, site marked, risks and benefits discussed, monitors and equipment checked, pre-op evaluation, timeout performed, anesthesia consent, sterile technique used, no prohibitive neurological deficits and no local skin infection at insertion site      Procedure Details    Patient Position:  Sitting  Prep: ChloraPrep and patient draped    Monitoring:  Cardiac monitor, continuous pulse ox, blood pressure cuff and heart rate  Block Type:  Interscalene  Laterality:  Left  Injection Technique:  Single-shot    Needle    Needle Type:  Short-bevel  Needle Gauge:  22 G  Needle Length:  50 mm  Needle Localization:  Ultrasound guidance, anatomical landmarks and nerve stimulator  Reason for Ultrasound Use: appropriate spread of the medication was noted in real time and no ultrasound evidence of intravascular and/or intraneural injection    Nerve Stimulator: 0.6 amps  Muscle Twitch Response: biceps response, deltoid response, flexor carpi radialis response and flexor carpi ulnaris response      Assessment    Injection Assessment:  Good spread noted, incremental injection, local visualized surrounding nerve on ultrasound, low pressure, negative aspiration for heme, no pain on injection and negative resistance  Paresthesia Pain:  None  Heart Rate Change: No    - Patient tolerated block procedure well  without evidence of immediate block related complications.     Medications  4/18/2025 7:24 AM  midazolam (VERSED)injection 2mg/2ml - Intravenous   2 mg - 4/18/2025 7:24:00 AM  lidocaine injection 1% - Intradermal   5 mL - 4/18/2025 7:24:00 AM  ropivacaine (NAROPIN) injection 0.5% - Infiltration   30 mL - 4/18/2025 7:24:00 AM  dexamethasone (DECADRON) PF injection 10 mg/ml - Injection   10 mg - 4/18/2025 7:24:00 AM    Additional Comments

## 2025-04-18 NOTE — ANESTHESIA POSTPROCEDURE EVALUATION
Patient: Thanh Espinoza    Procedure Summary       Date: 04/18/25 Room / Location: [facility] MAIN OR 02 / [facility] MAIN OR    Anesthesia Start: 0744 Anesthesia Stop: 0937    Procedure: Left shoulder arthroscopy with debridement, subacromial decompression with acromioplasty, acromioclavicular joint resection, rotator cuff repair and  biceps tenodesis (Left: Shoulder) Diagnosis: (Biceps tendinitis upper extremity, impingement syndrome, left shoulder, tendinopathy of left rotator cuff, arthritis of left acromioclavicular joint)    Surgeons: Harmeet George MD Anesthesiologist: Rajwinder Ramirez MD    Anesthesia Type: general, regional ASA Status: 3            Anesthesia Type: general, regional    Vitals Value Taken Time   /60 04/18/25 09:36   Temp 97.2 04/18/25 09:37   Pulse 86 04/18/25 09:36   Resp 27 04/18/25 09:36   SpO2 91 % 04/18/25 09:35   Vitals shown include unfiled device data.    [facility] AN Post Evaluation:   Patient Evaluated in PACU  Patient Participation: complete - patient participated  Level of Consciousness: awake and alert  Pain Score: 0  Pain Management: adequate  Airway Patency:patent  Yes    Nausea/Vomiting: none  Cardiovascular Status: acceptable  Respiratory Status: acceptable  Postoperative Hydration acceptable      Veronica Maher CRNA  4/18/2025 9:37 AM

## 2025-04-18 NOTE — ANESTHESIA PROCEDURE NOTES
Airway  Date/Time: 4/18/2025 7:51 AM  Reason: Elective      General Information and Staff   Patient location during procedure: OR  Resident/CRNA: Veronica Maher CRNA  Performed: CRNA   Performed by: Veronica Maher CRNA  Authorized by: Rajwinder Ramirez MD        Indications and Patient Condition  Indications for airway management: anesthesia  Sedation level: deep      Preoxygenated: yesPatient position: sniffing    Mask difficulty assessment: 1 - vent by mask    Final Airway Details    Final airway type: endotracheal airway    Successful airway: ETT  Cuffed: yes   Successful intubation technique: direct laryngoscopy  Endotracheal tube insertion site: oral  Blade: Ham  Blade size: #2  ETT size (mm): 7.0    Cormack-Lehane Classification: grade I - full view of glottis  Placement verified by: capnometry   Measured from: teeth  Number of attempts at approach: 1    Additional Comments  Dentitioin

## 2025-04-18 NOTE — ANESTHESIA PREPROCEDURE EVALUATION
Anesthesia PreOp Note    HPI:     Thanh Espinoza is a 64 year old female who presents for preoperative consultation requested by: Harmeet George MD    Date of Surgery: 4/18/2025    Procedure(s):  Left shoulder arthroscopy with debridement, subacromial decompression with acromioplasty, acromioclavicular joint resection, rotator cuff repair and possible biceps tenodesis  Indication: Biceps tendinitis upper extremity, impingement syndrome, left shoulder, tendinopathy of left rotator cuff, arthritis of left acromioclavicular joint    Relevant Problems   No relevant active problems       NPO:                         History Review:  Patient Active Problem List    Diagnosis Date Noted    TIA (transient ischemic attack)     Pre-op testing 11/11/2024    Tendon rupture, traumatic. quadriceps, right, subsequent encounter 03/03/2023    Primary osteoarthritis of right knee 09/15/2022    Thumb pain, right 04/15/2022    Sprain of interphalangeal joint of right little finger 04/15/2022    Benign paroxysmal vertigo of both ears 04/08/2022    Benign paroxysmal vertigo of left ear 03/30/2022    Stage 3a chronic kidney disease (HCC) 01/20/2022    Secondary hypercoagulable state (HCC) 12/20/2021    Hypertensive heart and renal disease with heart failure (HCC) 12/20/2021    Brain atrophy 12/20/2021    Stenosis of right carotid artery 50-60% 10/25/2021    Atrial flutter (HCC) 02/12/2021    Ectatic aorta 12/03/2020    Type 2 diabetes mellitus with stage 3 chronic kidney disease, with long-term current use of insulin ([facility]) 01/07/2020    Class 2 obesity due to excess calories with body mass index (BMI) of 39.0 to 39.9 in adult 06/07/2019    Emphysema of lung (HCC) 06/07/2019    Inflammatory spondylopathy of lumbar region 01/07/2019    Delirium 12/21/2018    Polypharmacy 12/21/2018    Episodic confusion 12/20/2018    Fall 12/20/2018    Sinus tachycardia 12/20/2018    Lumbosacral spondylosis without myelopathy 09/14/2018    Chronic pain of left  knee 08/21/2018    Syncope 06/18/2018    Chronic diastolic CHF (congestive heart failure) ([facility]) 01/24/2018    Gait abnormality 01/19/2018    PICC (peripherally inserted central catheter) flush 01/05/2018    Olecranon bursitis, right elbow 12/20/2017    Adrenal nodule ([facility]) 04/12/2017    Renal artery atherosclerosis 04/12/2017    Left ventricular diastolic dysfunction with preserved systolic function 07/22/2016    Mild atherosclerosis of carotid artery, bilateral 07/22/2016    Facial droop 07/15/2016    Aorto-iliac atherosclerosis 05/03/2016    Cerebellar tonsillar ectopia ([facility]) 05/03/2016    Type 2 diabetes mellitus with polyneuropathy ([facility]) 05/03/2016    Meniere's disease of both ears 02/09/2016    SNHL (sensorineural hearing loss) 02/01/2016    Esophageal reflux 04/30/2015    Chronic bilateral low back pain without sciatica 04/30/2015    Bipolar disorder ([facility])     Anxiety     Tobacco abuse 11/25/2014    Hypokalemia 03/20/2014    S/P total knee replacement using cement, left 07/08/2013    Benign essential tremor 12/26/2012    Hyperlipidemia 07/12/2012    OA (osteoarthritis) 07/12/2012    Obstructive sleep apnea (adult) (pediatric) 12/29/2011       Past Medical History[1]    Past Surgical History[2]    Prescriptions Prior to Admission[3]  Current Medications and Prescriptions Ordered in Epic[4]    Allergies[5]    Family History[6]  Social Hx on file[7]    Available pre-op labs reviewed.             Vital Signs:  Body mass index is 34.01 kg/m².   height is 1.6 m (5' 3\") and weight is 87.1 kg (192 lb).   Vitals:    04/16/25 1218   Weight: 87.1 kg (192 lb)   Height: 1.6 m (5' 3\")        Anesthesia Evaluation     Patient summary reviewed and Nursing notes reviewed    Airway   Mallampati: I  TM distance: >3 FB  Neck ROM: full  Dental    (+) upper dentures and partials    Pulmonary - normal exam   (+) COPD moderate, asthma, sleep apnea  Cardiovascular - normal exam  Exercise tolerance: good  (+) hypertension well  controlled, CHF    NYHA Classification: II  ECG reviewed  ROS comment: Impressions:     - Normal nuclear perfusion study.   - There is no evidence of myocardial ischemia.   - There is no evidence of myocardial infarction.   - Normal regional wall thickening is noted on gated SPECT analysis.   - Normal ejection fraction.     Prepared and signed by   Bryce Waters   11/03/2020 16:12   Summary:     1. Left ventricle: The cavity size is normal. Wall thickness is mildly      increased. Systolic function is normal. The estimated ejection fraction      is 55-60%. Wall motion is normal; there are no regional wall motion      abnormalities.   2. Right ventricle: Systolic function is normal. Right ventricular systolic      pressure could not be estimated.   3. Pericardium, extracardiac: There is no significant pericardial effusion.   4. Limited study due to poor acoustic windows.   5. No significant valvular abnormalities.     Impressions:  No previous study was available for comparison.     Prepared and signed by   Nick Garcia   10/30/2020 11:18       Neuro/Psych    (+)  seizures, neuromuscular disease, TIA, anxiety/panic attacks,  bipolar disorder, depression      Comments: No residual     GI/Hepatic/Renal    (+) GERD well controlled, chronic renal disease    Endo/Other    (+) diabetes mellitus type 2 well controlled using insulin    Comments: Past Medical History:   Diagnosis Date   LANDRY (acute kidney injury) ([facility]) 11/2014   hosp [facility]   Anxiety disorder   Arrhythmia   currently wearing a heart monitor , poss a flutter   Asthma 7/12/2012   Back pain   Bipolar disorder ([facility])   sees psych, Dr Qureshi   Congestive heart disease ([facility])   Depression   Emphysema of lung ([facility]) 6/7/2019   Esophageal reflux   Essential hypertension   High blood pressure   High cholesterol   Hyperlipidemia   Obesity   Obstructive sleep apnea (adult) (pediatric) 12/29/2011   MARCELA (obstructive sleep apnea) 8/6/21-PSG   AHI-14   PCOS (polycystic  ovarian syndrome)   Post partum depression   Seizure disorder (HCC)   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; used to have seizures before   Sleep apnea   no cpap   Type 2 diabetes mellitus with stage 3 chronic kidney disease, without long-term current use of insulin ([facility]) 7/18/2018   As per chart note on 6.17.19   Type II or unspecified type diabetes mellitus without mention of complication, not stated as uncontrolled   off metformin due to LANDRY   Pt has the following conditions: has a past medical history of LANDRY (acute kidney injury) ([facility]) (11/2014), Anxiety disorder, Arrhythmia, Asthma (7/12/2012), Back pain, Bipolar disorder ([facility]), Congestive heart disease ([facility]), Depression, Emphysema of lung ([facility]) (6/7/2019), Esophageal reflux, Essential hypertension, High blood pressure, High cholesterol, Hyperlipidemia, Obesity, Obstructive sleep apnea (adult) (pediatric) (12/29/2011), MARCELA (obstructive sleep apnea) (8/6/21-PSG), PCOS (polycystic ovarian syndrome), Post partum depression, Seizure disorder ([facility]), Sleep apnea, Type 2 diabetes mellitus with stage 3 chronic kidney disease, without long-term current use of insulin ([facility]) (7/18/2018), and Type II or unspecified type diabetes mellitus without mention of complication, not stated as uncontrolled.    She has no past medical history of Cancer ([facility]), Concussion, Coronary atherosclerosis, Dementia ([facility]), Difficult intubation, Fibromyalgia, Hypothyroidism, IBS (irritable bowel syndrome), Malignant hyperthermia, Other specified diseases of blood and blood-forming organs(289.89), PONV (postoperative nausea and vomiting), Systemic lupus ([facility]), Thyrotoxicosis, or Type 1 diabetes mellitus ([facility]).   Pt has no significant history of cardiac or pulmonary conditions.   Patient has no personal history of problems with anesthesia.   Patient is unaware of any family members having problems with anesthesia.   EKG done today NSR similar to prior.   Routine labs were drawn by request of  surgeon: see list.   Patient appears to be a good surgical candidate. She is able do more than 4 MET  Risk of cardiac event is low based off patient's history and examination, however surgery and anesthesia do carry risks of possible unforseen events. This was discussed with the patient.  This consult was sent back the referring physician, Dr Harmeet George       Abdominal  - normal exam     Other findings: 04/18/25 06:12  POC GLUCOSE: 173 (H)      (H): Data is abnormally high            Anesthesia Plan:   ASA:  3  Plan:   General and regional  Post-op Pain Management: Interscalene block  Informed Consent Plan and Risks Discussed With:  Patient and spouse  Discussed plan with:  Attending and surgeon  Provider Attestation (if preop done by other):  GA/ETT/block/PONV,dental damages etc      I have informed Thanh Espinoza and/or legal guardian or family member of the nature of the anesthetic plan, benefits, risks including possible dental damage if relevant, major complications, and any alternative forms of anesthetic management.   All of the patient's questions were answered to the best of my ability. The patient desires the anesthetic management as planned.  I have informed this Thanh Espinoza [relative] of the risks of regional anesthesia including, but not limited to: failure, toxicity, seizure, cardiac arrest, infection, bleeding, nerve damage. The patient desires the proposed regional anesthetic as planned.    VICKY MCKEON MD  4/18/2025 5:57 AM  Present on Admission:  **None**           [1]   Past Medical History:   LANDRY (acute kidney injury)    hosp [facility]    Anxiety disorder    Anxiety state    Arrhythmia    currently wearing a heart monitor , poss a flutter     Asthma ([facility])    denies    Back pain    Bipolar disorder ([facility])    sees psych, Dr Qureshi    Congestive heart disease ([facility])    few years ago    Depression    Disorder of thyroid    Emphysema of lung ([facility])    Esophageal reflux    Essential hypertension    Hearing  impairment    High blood pressure    High cholesterol    Hyperlipidemia    Obesity    Obstructive sleep apnea (adult) (pediatric)    MARCELA (obstructive sleep apnea)    AHI-14    Osteoarthritis    PCOS (polycystic ovarian syndrome)    Post partum depression    Renal disorder    CKD    Seizure disorder (HCC)    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; used to have seizures before    Sleep apnea    no cpap - patient denies sleep apnea    TIA (transient ischemic attack)    Type 2 diabetes mellitus with stage 3 chronic kidney disease, without long-term current use of insulin ([facility])    As per chart note on 19    Type II or unspecified type diabetes mellitus without mention of complication, not stated as uncontrolled    off metformin due to LANDRY   [2]   Past Surgical History:  Procedure Laterality Date    Brain surgery      pituitary tumor removed           x4     Carpal tunnel release      Left    Cataract Right 2021    Colonoscopy      normal per patient    Knee arthroscopy Right 2020    Dr Varela    Knee replacement surgery      Other surgical history      LEFT ANKLE orif & 6 months rem of Hardware    Other surgical history  1985    pituitary adenoma resection    Other surgical history      csection x3    Tonsillectomy      and adenoidectomy for sleep apnea    Total knee replacement  10/2013    left partial knee replacement   [3]   Medications Prior to Admission   Medication Sig Dispense Refill Last Dose/Taking    VENLAFAXINE  MG Oral Capsule SR 24 Hr TAKE 1 CAPSULE EVERY DAY WITH BREAKFAST 90 capsule 3 Taking    QUEtiapine 100 MG Oral Tab TAKE 1 TO 1 AND 1/2 TABLETS EVERY NIGHT AS NEEDED (SLEEP AND MOOD). THIS IS AN INCREASE. 90 tablet 0 Taking    acetaminophen 325 MG Oral Tab Take 2 tablets (650 mg total) by mouth every 4 (four) hours. Ok to take over the counter and ok to continue past 2 weeks. Do not exceed 4,000 mg per day.   Taking    celecoxib (CELEBREX) 200 MG Oral Cap Take 1  capsule (200 mg total) by mouth in the morning.   Past Week    pregabalin (LYRICA) 75 MG Oral Cap Take 1 capsule (75 mg total) by mouth in the morning.   Taking    senna-docusate 8.6-50 MG Oral Tab Take 1 tablet by mouth in the morning. Take daily while taking narcotics regularly for pain.   Taking    losartan 25 MG Oral Tab Take 1 tablet (25 mg total) by mouth in the morning.   Taking    spironolactone 50 MG Oral Tab Take 1 tablet (50 mg total) by mouth in the morning.   Taking    levothyroxine 25 MCG Oral Tab Take 1 tablet (25 mcg total) by mouth every morning before breakfast.   Taking    loratadine 10 MG Oral Tab    Taking    Semaglutide (OZEMPIC, 2 MG/DOSE, SC) Inject 0.5 mg into the skin once a week.   4/6/2025    diazePAM 5 MG Oral Tab Take 1 tablet (5 mg total) by mouth daily as needed for Anxiety (Do NOT take within 6 hours of narcotic pain medicines.). 30 tablet 2 Taking As Needed    lamoTRIgine 100 MG Oral Tab Take 1 tablet (100 mg total) by mouth every morning. 90 tablet 1 Taking    lamoTRIgine 200 MG Oral Tab Take 1 tablet (200 mg total) by mouth every morning. Take with 149ha=89yo total daily 90 tablet 1 Taking    Melatonin 3 MG Oral Tablet Dispersible Take 3-6 mg by mouth nightly as needed (for sleep). 90 tablet 1 Taking As Needed    metoprolol succinate ER 50 MG Oral Tablet 24 Hr Take 1 tablet (50 mg total) by mouth in the morning.   Taking    montelukast 10 MG Oral Tab Take 1 tablet (10 mg total) by mouth nightly.   Taking    LANTUS SOLOSTAR 100 UNIT/ML Subcutaneous Solution Pen-injector INJECT 11 UNITS INTO THE SKIN NIGHTLY. - NEED MEDICARE PART B CARD IN PERSON (Patient taking differently: as needed. Sliding scale) 15 each 5 Taking Differently    Insulin Aspart Pen (NOVOLOG FLEXPEN) 100 UNIT/ML Subcutaneous Solution Pen-injector FOLLOW SLIDING SCALE FOR -170 : 1 UNIT, -220: 3 UNITS, 221-270: 5 UNITS, 271-320: 7 UNITS, >320: 9 UNITS INJECT INTO THE SKIN 3 (THREE) TIMES DAILY BEFORE  MEALS. 15 mL 1 Taking    omeprazole 20 MG Oral Capsule Delayed Release Take 1 capsule (20 mg total) by mouth before breakfast. 90 capsule 3 Taking    dilTIAZem HCl ER (DILT-XR) 180 MG Oral Capsule SR 24 Hr Take 1 capsule (180 mg total) by mouth daily. 90 capsule 3 Taking    rosuvastatin 40 MG Oral Tab Take 1 tablet (40 mg total) by mouth nightly. 90 tablet 3 Taking    Naloxone HCl 4 MG/0.1ML Nasal Liquid 4 mg by Nasal route as needed. If patient remains unresponsive, repeat dose in other nostril 2-5 minutes after first dose. 1 kit 0     Continuous Blood Gluc Sensor (FREESTYLE MADALYN 14 DAY SENSOR) Does not apply Misc Use as directed 3 each 5     Insulin Pen Needle (PEN NEEDLES 5/16\") 31G X 8 MM Does not apply Misc Use with novolog pen injector three times daily 100 each 1     Insulin Pen Needle (BD PEN NEEDLE ORIGINAL U/F) 29G X 12.7MM Does not apply Misc USE DAILY AND THREE TIMES A DAY WITH NOVOLOG 100 each 2     Continuous Blood Gluc  (FREESTYLE MADALYN 14 DAY READER) Does not apply Device Take 1 Bottle by mouth As Directed.       Insulin Pen Needle (PEN NEEDLES 5/16\") 30G X 8 MM Does not apply Misc Use as directed with lantus 100 each 5     Alcohol Swabs (BD SWAB SINGLE USE REGULAR) Does not apply Pads USE AS DIRECTED 100 each 3     Accu-Chek FastClix Lancets Does not apply Misc USE AS DIRECTED 102 each 3     Glucose Blood (ACCU-CHEK GUIDE) In Vitro Strip USE AS DIRECTED 100 strip 3     Blood Glucose Monitoring Suppl (ACCU-CHEK DAMARI SMARTVIEW) w/Device Does not apply Kit Test once daily 1 kit 0    [4]   Current Facility-Administered Medications Ordered in Epic   Medication Dose Route Frequency Provider Last Rate Last Admin    lactated ringers infusion   Intravenous Continuous George, MD Harmeet        acetaminophen (Tylenol Extra Strength) tab 1,000 mg  1,000 mg Oral Once Ariel, MD Harmeet        metoprolol tartrate (Lopressor) tab 25 mg  25 mg Oral Once PRN Ariel, MD Harmeet        famotidine (Pepcid) tab 20 mg   20 mg Oral Once Harmeet George MD        Or    famotidine (Pepcid) 20 mg/2mL injection 20 mg  20 mg Intravenous Once Harmeet George MD        vancomycin (Vancocin) 1.25 g in sodium chloride 0.9% 250mL IVPB premix  15 mg/kg Intravenous Once Harmeet George MD        And    gentamicin (Garamycin) 340 mg in sodium chloride 0.9% 100 mL IVPB  5 mg/kg (Adjusted) Intravenous Once Harmeet George MD         No current [facility]-ordered outpatient medications on file.   [5]   Allergies  Allergen Reactions    Cefazolin HIVES    Metformin RENAL INSUFFICIENCY    Hydrocodone ITCHING    Other ITCHING     bandaid    Trace Metals RASH    Tramadol ITCHING   [6]   Family History  Problem Relation Age of Onset    Diabetes Mother     Hypertension Mother     Obesity Mother     Cancer Sister         twin breast    OCD Sister     Breast Cancer Sister 61        age at dx 61    Alcohol and Other Disorders Associated Father     Bipolar Disorder Father         possible    Bipolar Disorder Brother         unsure    Breast Cancer Sister 59        age at dx 59    Obesity Daughter     Bipolar Disorder Daughter         also adhd daughter    ADHD Daughter     Other (MS) Daughter     Bipolar Disorder Son         2 sons and one daughter have bipolar and adhd is on seroquel, ritalin,     ADHD Son    [7]   Social History  Socioeconomic History    Marital status:    Tobacco Use    Smoking status: Former     Current packs/day: 0.00     Average packs/day: 0.8 packs/day for 45.0 years (33.8 ttl pk-yrs)     Types: Cigarettes     Start date: 1974     Quit date: 2018     Years since quittin.2    Smokeless tobacco: Never    Tobacco comments:     max 1 pk/dy, 0.75 pk/dy    Vaping Use    Vaping status: Never Used   Substance and Sexual Activity    Alcohol use: No     Alcohol/week: 0.0 standard drinks of alcohol    Drug use: No

## 2025-04-18 NOTE — H&P
ORTHO VISIT NOTE      Thanh Espinoza - MRN: [id number]    HISTORY OF PRESENT ILLNESS: The patient is a 64 year old year old female who comes in today for follow-up of her left shoulder pain following a fall in November 2023. Continues to have pain at the 7 out of 10 intensity, better with rest, and worse activity. Previously had 2 injections as well as physical therapy without significant proved in her pain. Pain interferes with ability perform ADLs. Denies instability the shoulder. Does report significant weakness of the shoulder. Reports that she has taken Percocet with improvement her pain. She is allergic to Norco and tramadol and has tried meloxicam and diclofenac without significant improvement in her pain.    Of note, she underwent a right total knee revision by Dr. Burgess in November 2024.      Past Medical History:  Diagnosis Date  LANDRY (acute kidney injury) ([facility]) 11/2014  hosp [facility]  Anxiety disorder  Arrhythmia  currently wearing a heart monitor , poss a flutter  Asthma 7/12/2012  Back pain  Bipolar disorder ([facility])  sees psych, Dr Qureshi  Congestive heart disease ([facility])  Depression  Emphysema of lung ([facility]) 6/7/2019  Esophageal reflux  Essential hypertension  High blood pressure  High cholesterol  Hyperlipidemia  Obesity  Obstructive sleep apnea (adult) (pediatric) 12/29/2011  MARCELA (obstructive sleep apnea) 8/6/21-PSG  AHI-14  PCOS (polycystic ovarian syndrome)  Post partum depression  Seizure disorder ([facility])  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; used to have seizures before  Sleep apnea  no cpap  Type 2 diabetes mellitus with stage 3 chronic kidney disease, without long-term current use of insulin ([facility]) 7/18/2018  As per chart note on 6.17.19  Type II or unspecified type diabetes mellitus without mention of complication, not stated as uncontrolled  off metformin due to LANDRY    Past Surgical History:  Procedure Laterality Date  ARTHRP KNE CONDYLE&PLATU MEDIAL&LAT COMPARTMENTS 10/2013  left partial knee  replacement  BRAIN SURGERY  pituitary tumor removed    x4  CARPAL TUNNEL RELEASE  Left  CATARACT Bilateral 2021  COLONOSCOPY STOMA DX INCLUDING COLLJ SPEC SPX 2014  normal per patient  KNEE ARTHROSCOPY Right 2020  Dr Varela  KNEE REPLACEMENT SURGERY  OTHER SURGICAL HISTORY  LEFT ANKLE orif & 6 months rem of Hardware  OTHER SURGICAL HISTORY 1985  pituitary adenoma resection  OTHER SURGICAL HISTORY  csection x3  TONSILLECTOMY  and adenoidectomy for sleep apnea    Current Outpatient Medications:  POTASSIUM CHLORIDE 20 MEQ Oral Tab CR, TAKE 1 TABLET BY MOUTH EVERY DAY, Disp: 90 tablet, Rfl: 1  LANTUS SOLOSTAR 100 UNIT/ML Subcutaneous Solution Pen-injector, INJECT 11 UNITS INTO THE SKIN NIGHTLY (DISCARD PEN 28 DAYS AFTER OPENING), Disp: 15 mL, Rfl: 3  losartan 25 MG Oral Tab, TAKE 1 TABLET EVERY DAY, Disp: 90 tablet, Rfl: 1  benzonatate 100 MG Oral Cap, Take 1 capsule (100 mg total) by mouth 3 (three) times daily as needed., Disp: 60 capsule, Rfl: 0  Semaglutide,0.25 or 0.5MG/DOS, (OZEMPIC, 0.25 OR 0.5 MG/DOSE,) 2 MG/3ML Subcutaneous Solution Pen-injector, Inject 0.5 mg into the skin once a week., Disp: , Rfl:  levothyroxine 25 MCG Oral Tab, Take 1 tablet (25 mcg total) by mouth before breakfast., Disp: 90 tablet, Rfl: 0  dilTIAZem HCl  MG Oral Capsule SR 24 Hr, Take 1 capsule (180 mg total) by mouth daily., Disp: 90 capsule, Rfl: 0  Insulin Aspart Pen (NOVOLOG FLEXPEN) 100 UNIT/ML Subcutaneous Solution Pen-injector, FOLLOW SLIDING SCALE FOR -170 : 1 UNIT, -220: 3 UNITS, 221-270: 5 UNITS, 271-320: 7 UNITS, >320: 9 UNITS INJECT INTO THE SKIN 3 (THREE) TIMES DAILY BEFORE MEALS., Disp: 30 mL, Rfl: 3  montelukast 10 MG Oral Tab, Take 1 tablet (10 mg total) by mouth nightly., Disp: 90 tablet, Rfl: 3  TOPIRAMATE 25 MG Oral Tab, TAKE 1 TABLET (25 MG TOTAL) BY MOUTH 2 (TWO) TIMES DAILY. FOR FIRST WEEK WHEN STARTING, TAKE 1/2 TAB TWICE DAILY. THEN IF NO SIDE EFFECTS, DO FULL TAB TWICE DAILY  AS WRITTEN., Disp: 180 tablet, Rfl: 1  TRIAMCINOLONE 0.1 % External Cream, APPLY TOPICALLY 2 (TWO) TIMES DAILY AS NEEDED. TO RASH ON ARMS/LEGS/CHEST, Disp: 60 g, Rfl: 1  aspirin 81 MG Oral Tab EC, Take 1 tablet (81 mg total) by mouth 2 (two) times daily. To take for 6 weeks total for blood clot prevention., Disp: 84 tablet, Rfl: 0  pregabalin (LYRICA) 75 MG Oral Cap, Take 1 capsule (75 mg total) by mouth daily., Disp: 30 capsule, Rfl: 0  LORATADINE 10 MG Oral Tab, TAKE 1 TABLET EVERY DAY, Disp: 90 tablet, Rfl: 3  DROPLET PEN NEEDLES 31G X 8 MM Does not apply Misc, USE WITH NOVOLOG PEN THREE TIMES DAILY, Disp: 300 each, Rfl: 3  OMEPRAZOLE 20 MG Oral Capsule Delayed Release, TAKE 1 CAPSULE BEFORE BREAKFAST, Disp: 90 capsule, Rfl: 3  rosuvastatin 40 MG Oral Tab, TAKE 1 TABLET EVERY NIGHT, Disp: 30 tablet, Rfl: 0  torsemide 10 MG Oral Tab, Take 1 tablet (10 mg total) by mouth daily., Disp: 90 tablet, Rfl: 0  Continuous Glucose Sensor (FREESTYLE MADALYN 2 SENSOR) Does not apply Misc, 1 each every 14 (fourteen) days., Disp: 6 each, Rfl: 5  Continuous Glucose  (FREESTYLE MADALYN 2 READER) Does not apply Device, 1 each As Directed., Disp: 1 each, Rfl: 3  EMGALITY 120 MG/ML Subcutaneous Solution Auto-injector, Inject 120 mg into the skin every 30 (thirty) days., Disp: 3 each, Rfl: 3  Lancets 30G Does not apply Misc, Test 3 times daily, Disp: 300 each, Rfl: 3  lamoTRIgine 100 MG Oral Tab, TAKE 1 TABLET (100 MG TOTAL) BY MOUTH NIGHTLY. THIS IS AN INCREASE OF TOTAL AMOUNT DAILY, Disp: , Rfl:  lamoTRIgine 200 MG Oral Tab, , Disp: , Rfl:  QUETIAPINE 50 MG Oral Tab, TAKE 1 TO 2 TABLETS EVERY NIGHT FOR SLEEP, HALLUCINATIONS AND MOOD, Disp: 180 tablet, Rfl: 0  venlafaxine (EFFEXOR XR) 150 MG Oral Capsule SR 24 Hr, Take 1 capsule (150 mg total) by mouth daily with breakfast., Disp: 90 capsule, Rfl: 1    Cefazolin HIVES  Metformin RENAL INSUFFICIENCY  Norco [Hydrocodone-* ITCHING, SWELLING  Other ITCHING  Comment:bandaid  Tramadol  ITCHING    Social History  Tobacco Use  Smoking status: Former  Packs/day: 0.00  Years: 0.8 packs/day for 45.0 years (33.8 ttl pk-yrs)  Types: Cigarettes  Start date: 1974  Quit date: 2018  Years since quittin.1  Passive exposure: Never  Smokeless tobacco: Never  Tobacco comments: max 1 pk/dy, 0.75 pk/dy  Vaping Use  Vaping status: Never Used  Alcohol use: No  Alcohol/week: 0.0 standard drinks of alcohol  Drug use: No      PHYSICAL EXAMINATION: Exam of her left shoulder demonstrates no gross deformity. The patient's active ROM was forward flexion and abduction to 135 degrees, ER to 25 degrees, and IR to back pocket. Positive Neer and Yadav exam. Normal belly press and lift-off exam. Positive Speed's exam. Mildly positive Albany's exam. Negative cross body abduction exam. 4- out of 5 strength in her rotator cuff muscles at the side and in her deltoid. Sensation intact to light touch in the Axillary, Radial, Median, and Ulnar distributions. +2 radial pulse palpable. Negative spurling's exam.    IMAGING: MRI of the patient's left shoulder from 2024 demonstrates severe rotator cuff tendinopathy, moderate AC joint arthritis, significant biceps tendinosis, and labral irregularity consistent with tear.    IMPRESSION: Biceps tendinitis of left upper extremity (primary encounter diagnosis)  Impingement syndrome of left shoulder  Tendinopathy of left rotator cuff  Arthritis of left acromioclavicular joint    PLAN: The diagnosis and expected treatment course were extensively discussed with her. We discussed treatment options including injection, therapy, and surgery. As she has not seen significant improvement following prior injection and physical therapy I recommend surgical management. Surgical management would include a shoulder arthroscopy with debridement, subacromial decompression with acromioplasty, rotator cuff repair, AC joint resection, and possible biceps tenodesis. Risks, benefits,  alternative surgery discussed with her. Risk discussed include bleeding, infection, stiffness, nerve damage, blood clots, pain after surgery, and need for future surgery. She understands the risks and wishes to proceed with surgery. My surgical scheduler's information was given to her and she will call to schedule surgery for next available at her convenience. In the meantime Percocet will be sent to her for pain as she is allergic to Norco and tramadol.    All questions answered.    Harmeet George MD.  Electronically signed by Harmeet George MD at 03/31/2025 7:59 PM CDT     Above copied H&P reviewed on 04/18/25, no significant changes noted.

## 2025-04-18 NOTE — OPERATIVE REPORT
Operative Report      DATE: 04/18/25    PATIENT'S PREOPERATIVE DIAGNOSIS:    Left Shoulder Rotator Cuff Tear  Left Subacromial Impingement  Left Shoulder AC Joint Resection  Left Shoulder Biceps Tendonitis     PATIENT'S POSTOPERATIVE DIAGNOSIS:  Left Shoulder Rotator Cuff Tear  Left Subacromial Impingement  Left Shoulder AC Joint Resection  Left Shoulder Biceps Tendonitis     THE OPERATION:  Left Shoulder Rotator Cuff Repair (53370)  Left Shoulder Subacromial Decompression with Acromioplasty (45994)  Left Shoulder Extensive Debridement (08319)  Left Shoulder Biceps Tenodesis (28561)  Left Shoulder AC Joint Resection (65658)     SURGEON:  Harmeet George MD    FIRST ASSISTANT: Maura Tse  First assist was necessary for positioning of the patient, manipulating tissue, holding the camera, and implanting implants during the procedure.     COMPLICATIONS:  None     ESTIMATED BLOOD LOSS:  Min     ANESTHESIA: Interscalene Block and General     IMPLANTS:    Implant Name Type Inv. Item Serial No.  Lot No. LRB No. Used Action   ANCHOR SUT W3.9XL17.9MM PEEK SWIVELOCK - SNA  ANCHOR SUT W3.9XL17.9MM PEEK SWIVELOCK NA [employer] WD [id number] Left 1 Implanted   ANCHOR SUT [id number] PEEK W/ 1.3MM SUT TAPE - SNA  ANCHOR SUT [id number] PEEK W/ 1.3MM SUT TAPE NA [employer] WD [id number] Left 1 Implanted   SWIVELOCK, SP PEEK 4.75MM - SNA  SWIVELOCK, SP PEEK 4.75MM NA [employer] WD [id number] Left 1 Implanted         FINDINGS:   Biceps tendon - Significant Insertional Fraying  Rotator cuff - Medium Sized Full Thickness Supraspinatus Tear  Labrum - Extensive Circumferential Fraying  Humeral Head - Grade 1-2 Wear  Glenoid - Grade 2 Wear      THE OPERATION IS AS FOLLOWS:    Prior to being brought into the operating room the patient was met in the pre-operative holding area where all questions were answered and the patient's operative extremity was signed.  Risks, benefits, alternative surgery discussed with the patient again and  sheunderstood the risks and agreed to proceed with surgery.  Risk discussed include bleeding, infection, stiffness, nerve damage, continued pain after surgery, need for future surgery, and failure of any tenodesis or repair that is performed.  The patient was then brought in the operating room and placed in the beach chair position on the operating room table. After the smooth induction of anesthesia by the anesthesia team the operative upper extremity was prepped and draped in the usual sterile fashion.  Prior to the operative procedure a time out was performed per hospital protocol.    Following time out a standard posterior arthroscopic viewing portal and anterior arthroscopic working portal were made. A diagnostic arthroscopy was performed with findings noted above.  A synovectomy was performed and all labral fraying was removed using the arthroscopic shaver and ArthroCare.  This included removing significant frayed labral tissue in the anterior and posterior labrum.  A chondroplasty of the humeral head and glenoid was also performed using the arthroscopic shaver.       An arthroscopic biceps tenodesis was then performed.  A knee scorpion was then used to pass a suture link suture through and imbricate the biceps tendon.  The tendon was then release from the labrum.  One swivel lock anchor was then attached to the suture link suture and used to perform the biceps tendonesis at the superior extent of the biceps tunnel.  Images were taken of the tenodesis.    The subacromial space was entered and a lateral portal was established. A complete bursectomy was performed using the arthroscopic shaver and an ArthroCare.  Care was taken to recreate the anterior, lateral, and posterior gutters and sure all hyperemic and thick bursal tissue was excised.    The rotator cuff tear was then inspected.  Details of the tear listed above under findings.    A double loaded loaded Arthrex corkscrew anchor was placed along the  articular margin of the humerus. Using the express sew device all 4 sutures were passed through the tendon in a mattress configuration.  The Sutures were then tied in place. One Swivel Lock from [employer] was placed  long the lateral aspect of the greater tuberosity and used to secure the lateral margin of the rotator cuff tendon to the humerus. The repair was probed and was found to be tight with restoration of the normal rotator cuff tension onto the anatomic footprint.    The acromioplasty was then performed.  This was performed first by removing all synovial tissue off the undersurface of the acromion using the ArthroCare device.  This is followed by using a 5.0 mm resector to remove  5-7 mm of bone from the undersurface of the acromion and changing the acromial morphology to a type I morphology.  Care was taken to ensure that this acromioplasty was continued all the way to the lateral edge of the acromioclavicular joint.    Subsequent to resection of the medial inferior acromion, the acromioclavicular joint was visualized. The distal clavicle was misshapen with inferior osteophytes and hypertrophy consistent with preoperative imaging. A distal clavicle resection was performed arthroscopically with a 5.5 mm shaver and dennise. The arthroscope was placed in the lateral portal with a 5.5 mm shaver/dennise placed into the anterior accessory portal. The 5.5 mm shaver/dennise was used to resect approximately 8-10 mm of bone off the distal clavicle. This created space between the medial acromion and distal clavicle which had not existed before. Care was taken to preserve the superior acromioclavicular joint ligaments.    Closure was then began.  Closure was began by removing the camera from the shoulder and draining all excess arthroscopic fluid from the shoulder using manual pressure.  This is followed by using 3-0 vicryl for the subcutaneous tissue and and 3-0 nylon sutures for the skin.  Dressings were placed followed by  shoulder immobilizer. The  patient was transferred from operating room to recovery room in stable condition.    At the conclusion of the procedure all counts were performed and were correct.

## (undated) DEVICE — BNDG COMPR W6INXL3YD DISP

## (undated) DEVICE — BLADE OSTEOTOME 9986-21-22

## (undated) DEVICE — E-Z BUTTON SWITCH PENCIL

## (undated) DEVICE — TUBING MEGADYNE SPECULUM

## (undated) DEVICE — BLADE ELECTRODE: Brand: EDGE

## (undated) DEVICE — GOWN,SIRUS,FABRIC-REINFORCED,X-LARGE: Brand: MEDLINE

## (undated) DEVICE — HOOD, PEEL-AWAY: Brand: FLYTE

## (undated) DEVICE — STOCKINETTE,IMPERVIOUS,12X48,STERILE: Brand: MEDLINE

## (undated) DEVICE — HOOD: Brand: FLYTE

## (undated) DEVICE — DRESS WOUND AQUACEL 3.5INX12IN

## (undated) DEVICE — TUBING PMP L16FT DISP INFLOW

## (undated) DEVICE — SUT VICRYL 2-0 CP-1 J266H

## (undated) DEVICE — DRAPE,U/SHT,SPLIT,FILM,60X84,STERILE: Brand: MEDLINE

## (undated) DEVICE — BNDG COHESIVE W4INXL5YD TAN E

## (undated) DEVICE — SOL  .9 1000ML BAG

## (undated) DEVICE — PADDING CAST COTTON  4

## (undated) DEVICE — SYRINGE 30ML LL TIP

## (undated) DEVICE — Device: Brand: STABLECUT®

## (undated) DEVICE — BLADE SHV L13CM DIA4MM EXCALIBUR AGG COOLCUT

## (undated) DEVICE — SOL NACL IRRIG 0.9% 1000ML BTL

## (undated) DEVICE — BANDAGE,COHESIVE,TAN,4X5YD,LF,STRL: Brand: MEDLINE

## (undated) DEVICE — STERILE SYNTHETIC POLYISOPRENE POWDER-FREE SURGICAL GLOVES WITH HYDROGEL COATING, SMOOTH FINISH, STRAIGHT FINGER: Brand: PROTEXIS

## (undated) DEVICE — SHOULDER ARTHROSCOPY: Brand: MEDLINE INDUSTRIES, INC.

## (undated) DEVICE — KIT EVAC 400CC DIA1/8IN H PAT 12.5IN 3 SPR

## (undated) DEVICE — APPLICATOR CHLORAPREP 26ML

## (undated) DEVICE — CEMENT MIXING SYSTEM WITH FEMORAL BREAKWAY NOZZLE: Brand: REVOLUTION

## (undated) DEVICE — PROXIMATE RH ROTATING HEAD SKIN STAPLERS (35 WIDE) CONTAINS 35 STAINLESS STEEL STAPLES: Brand: PROXIMATE

## (undated) DEVICE — STOCK SURG XL 48X12IN

## (undated) DEVICE — BUR SHV L13CM DIA5MM WHT 8 FLUT OVL DISP

## (undated) DEVICE — AMBIENT SUPER TURBOVAC 90: Brand: COBLATION

## (undated) DEVICE — 2T11 #2 PDO 36 X 36: Brand: 2T11 #2 PDO 36 X 36

## (undated) DEVICE — APPLICATOR PREP 10.5ML ORNG CHG 2% ISO ALC

## (undated) DEVICE — BIPOLAR SEALER 23-112-1 AQM 6.0: Brand: AQUAMANTYS™

## (undated) DEVICE — DISPOSABLE TOURNIQUET CUFF SINGLE BLADDER, DUAL PORT AND QUICK CONNECT CONNECTOR: Brand: COLOR CUFF

## (undated) DEVICE — STERILE POLYISOPRENE POWDER-FREE SURGICAL GLOVES: Brand: PROTEXIS

## (undated) DEVICE — BANDAGE EZY-WRAP ULTRA 6

## (undated) DEVICE — COVER,MAYO STAND,STERILE: Brand: MEDLINE

## (undated) DEVICE — SOLUTION  .9 1000ML BTL

## (undated) DEVICE — TOTAL KNEE CDS: Brand: MEDLINE INDUSTRIES, INC.

## (undated) DEVICE — SOLUTION RUBBING 4OZ 70% ISO ALC CLR

## (undated) DEVICE — CONT SPEC 4OZ POLYPR GRAD LEAK

## (undated) DEVICE — KIT BEACH CHR FOAM W/ SUPP ARM DRP

## (undated) DEVICE — SUT ORTHOCORD 2 OS-6 223103

## (undated) DEVICE — SUT MCRYL 3-0 27IN ABSRB UD L24MM PS-1

## (undated) DEVICE — SUT STRATAFIX SPRL MCRYL+ 2-0 27IN CT-1 ABSRB

## (undated) DEVICE — Device

## (undated) DEVICE — STERILE PATIENT PROTECTIVE PAD FOR IMP® KNEE POSITIONERS & COHESIVE WRAP (10 / CASE): Brand: DE MAYO KNEE POSITIONER®

## (undated) DEVICE — SUT TIGERLINK SUTTAPE 1.3MM WHT/BLK CLS

## (undated) DEVICE — BLADE SHV 5.5MMX13CM BNE CUT COOLCUT

## (undated) DEVICE — GAMMEX® PI HYBRID SIZE 7, STERILE POWDER-FREE SURGICAL GLOVE, POLYISOPRENE AND NEOPRENE BLEND: Brand: GAMMEX

## (undated) DEVICE — RECIPROCATING BLADE, DOUBLE SIDED, OFFSET  (70.0 X 1.0 X 12.5MM)

## (undated) DEVICE — PRECISION (12.0 X 0.51 X 34.5MM)

## (undated) DEVICE — APPLICATOR PREP 26ML CHG 2% ISO ALC 70%

## (undated) DEVICE — PADDING CAST COTTON  6

## (undated) DEVICE — NEPTUNE E-SEP SMOKE EVACUATION PENCIL, COATED, 70MM BLADE, PUSH BUTTON SWITCH: Brand: NEPTUNE E-SEP

## (undated) DEVICE — SLEEVE KENDALL SCD EXPRESS MED

## (undated) DEVICE — PENCIL ES BTTN SWCH W/ TIP HOLSTER E-Z CLN

## (undated) DEVICE — COVER,LIGHT,CAMERA,HARD,1/PK,STRL: Brand: MEDLINE

## (undated) DEVICE — SIGMA LCS HIGH PERFORMANCE INSTRUMENTS STERILE THREADED PINS: Brand: SIGMA LCS HIGH PERFORMANCE

## (undated) DEVICE — UNIVERSAL STERIBUMP® STERILE (5/CASE): Brand: UNIVERSAL STERIBUMP®

## (undated) DEVICE — BEACH CHAIR MASK SGL USE

## (undated) DEVICE — SLEEVE COMPR MD KNEE LEN SGL USE KENDALL SCD

## (undated) DEVICE — GLOVE SUR 6.5 SENSICARE PI PIP CRM PWD F

## (undated) DEVICE — UNDYED BRAIDED (POLYGLACTIN 910), SYNTHETIC ABSORBABLE SUTURE: Brand: COATED VICRYL

## (undated) DEVICE — MEDI-VAC NON-CONDUCTIVE SUCTION TUBING: Brand: CARDINAL HEALTH

## (undated) DEVICE — NEEDLE SUT FOR EXPRESSEW LL AND LLL SUT PASS

## (undated) DEVICE — GOWN SUR 3XL LEV 3 BLU W/ GRN NK BND AERO BLU

## (undated) DEVICE — SUT MCRYL 2-0 27IN SH ABSRB UD 26MM 1/2 CIR

## (undated) DEVICE — 3M™ IOBAN™ 2 ANTIMICROBIAL INCISE DRAPE 6648EZ: Brand: IOBAN™ 2

## (undated) DEVICE — SHEET,DRAPE,40X58,STERILE: Brand: MEDLINE

## (undated) DEVICE — SOLUTION IRRIG 1000ML 0.9% NACL USP BTL

## (undated) DEVICE — SUT ETHLN 3-0 18IN PS-1 NABSRB BLK 24MM 3/8 C

## (undated) DEVICE — GLOVE SUR 7 SENSICARE PI PIP CRM PWD F

## (undated) DEVICE — GAMMEX® PI HYBRID SIZE 9, STERILE POWDER-FREE SURGICAL GLOVE, POLYISOPRENE AND NEOPRENE BLEND: Brand: GAMMEX

## (undated) DEVICE — BNDG,ELSTC,MATRIX,STRL,6"X5YD,LF,HOOK&LP: Brand: MEDLINE

## (undated) DEVICE — 450 ML BOTTLE OF 0.05% CHLORHEXIDINE GLUCONATE IN 99.95% STERILE WATER FOR IRRIGATION, USP AND APPLICATOR.: Brand: IRRISEPT ANTIMICROBIAL WOUND LAVAGE

## (undated) DEVICE — SUT ETHLN 3-0 18IN PS-2 NABSRB BLK 19MM 3/8 C

## (undated) DEVICE — STRYKER PERFORMANCE SERIES SAGITTAL BLADE: Brand: STRYKER PERFORMANCE SERIES

## (undated) DEVICE — STANDARD HYPODERMIC NEEDLE,POLYPROPYLENE HUB: Brand: MONOJECT

## (undated) DEVICE — APPLICATOR CHLORAPREP 10.5ML

## (undated) DEVICE — SPONGE 4X4 10PK

## (undated) DEVICE — ZZ-CONVERTED-TO-522442- SPONGE 4X4 10PK

## (undated) DEVICE — GLOVE SUR 7.5 SENSICARE PI PIP GRN PWD F

## (undated) DEVICE — GAUZE SPONGES,USP TYPE VII GAUZE, 12 PLY: Brand: CURITY

## (undated) DEVICE — LOWER EXTREMITY CDS-LF: Brand: MEDLINE INDUSTRIES, INC.

## (undated) DEVICE — CANNULA ARTHSCP 8X40MM SIL BTTN LP 2

## (undated) DEVICE — ESMARK COMP BANDAGE 6IN

## (undated) DEVICE — CONTAINER,SPECIMEN,PNEU TUBE,4OZ,OR STRL: Brand: MEDLINE

## (undated) DEVICE — CANNULA ENDOSCP 5.75MMX7CM DST RNG W/ ORNG

## (undated) DEVICE — IMMOBILIZR KNEE 1 SZ FIT MOST CUTAWAY TRIM

## (undated) DEVICE — NEEDLE SPNL 18GA L3.5IN PNK QNCKE STYL DISP

## (undated) DEVICE — LIGHT HANDLE

## (undated) DEVICE — ADHESIVE SKIN TOP FOR WND CLSR DERMBND ADV

## (undated) DEVICE — SOLUTION IRRIG 3000ML 0.9% NACL FLX CONT

## (undated) DEVICE — DRESS WOUND AQUACEL 3.5INX10IN

## (undated) DEVICE — WRAP COOLING KNEE W/ICE PILLOW

## (undated) DEVICE — SYRINGE MED 30ML STD CLR PLAS LL TIP N CTRL

## (undated) DEVICE — SUT VCRL 3-0 18IN PS-2 ABSRB UD L19MM 3/8 CIR

## (undated) DEVICE — WRAP COMPR UNIV KNEE HOT CLD GEL MICWV AND

## (undated) DEVICE — GAMMEX® PI HYBRID SIZE 7.5, STERILE POWDER-FREE SURGICAL GLOVE, POLYISOPRENE AND NEOPRENE BLEND: Brand: GAMMEX

## (undated) DEVICE — SCREW FIX 3.5X48MM HEX HD

## (undated) DEVICE — SIGMA LCS HIGH PERFORMANCE STERILE THREADED HEADED PINS: Brand: SIGMA LCS HIGH PERFORMANCE

## (undated) DEVICE — WRAP COOLING SHLDR W/ICE PILLO

## (undated) DEVICE — SYRINGE,TOOMEY,IRRIGATION,70CC,STERILE: Brand: MEDLINE

## (undated) DEVICE — 3M™ STERI-DRAPE™ U-DRAPE 1015: Brand: STERI-DRAPE™

## (undated) DEVICE — GLOVE SUR 6.5 SENSICARE PI PIP GRN PWD F

## (undated) DEVICE — NEEDLE SUT KNEE LP SCORPION

## (undated) DEVICE — PADDING CAST COTTON STER 6

## (undated) DEVICE — BOWL CEMENT MIX QUICK-VAC

## (undated) NOTE — LETTER
OUTSIDE TESTING RESULT REQUEST     IMPORTANT: FOR YOUR IMMEDIATE ATTENTION  Please FAX all test results listed below to: [phone]      * * * * If testing is NOT complete, arrange with patient A.S.A.P. * * * *      Patient Name: Kelley Mejia  Surgery Date: 2023  CSN: [id number]  Medical Record: [id number]   : 1960 - A: 57 y      Sex: female  Surgeon(s):  Ellen Fatima MD  Procedure: RIGHT QUADRACEPS TENDON REPAIR  Anesthesia Type: Choice     Surgeon: Ellen Fatima MD     The following Testing and Time Line are REQUIRED PER ANESTHESIA     BMP (requires 4 hour fast) within  60 days      Thank You,   Sent by: iRnku Woody

## (undated) NOTE — LETTER
OUTSIDE TESTING RESULT REQUEST     IMPORTANT: FOR YOUR IMMEDIATE ATTENTION  Please FAX all test results listed below to: [phone]      * * * * If testing is NOT complete, arrange with patient A.S.A.P. * * * *      Patient Name: Yolanda Pacheco  Surgery Date: 2023  CSN: [id number]  Medical Record: [id number]   : 1960 - A: 57 y      Sex: female  Surgeon(s):  Marleni Leos MD  Procedure: RIGHT QUADRACEPS TENDON REPAIR  Anesthesia Type: Choice     Surgeon: Marleni Leos MD     The following Testing and Time Line are REQUIRED PER ANESTHESIA     BMP (requires 4 hour fast) within  60 days      Thank You,   Sent by: Hillary Pineda